# Patient Record
Sex: MALE | Race: WHITE | Employment: FULL TIME | URBAN - METROPOLITAN AREA
[De-identification: names, ages, dates, MRNs, and addresses within clinical notes are randomized per-mention and may not be internally consistent; named-entity substitution may affect disease eponyms.]

---

## 2022-06-16 ENCOUNTER — OFFICE VISIT (OUTPATIENT)
Dept: FAMILY MEDICINE CLINIC | Facility: CLINIC | Age: 62
End: 2022-06-16
Payer: COMMERCIAL

## 2022-06-16 VITALS
HEIGHT: 70 IN | BODY MASS INDEX: 31.78 KG/M2 | HEART RATE: 82 BPM | WEIGHT: 222 LBS | DIASTOLIC BLOOD PRESSURE: 80 MMHG | RESPIRATION RATE: 14 BRPM | SYSTOLIC BLOOD PRESSURE: 148 MMHG | TEMPERATURE: 99 F | OXYGEN SATURATION: 99 %

## 2022-06-16 DIAGNOSIS — J06.9 URI WITH COUGH AND CONGESTION: Primary | ICD-10-CM

## 2022-06-16 DIAGNOSIS — Z20.822 EXPOSURE TO COVID-19 VIRUS: ICD-10-CM

## 2022-06-16 DIAGNOSIS — R03.0 ELEVATED BLOOD PRESSURE READING: ICD-10-CM

## 2022-06-16 PROCEDURE — 3008F BODY MASS INDEX DOCD: CPT | Performed by: PHYSICIAN ASSISTANT

## 2022-06-16 PROCEDURE — 3077F SYST BP >= 140 MM HG: CPT | Performed by: PHYSICIAN ASSISTANT

## 2022-06-16 PROCEDURE — 3079F DIAST BP 80-89 MM HG: CPT | Performed by: PHYSICIAN ASSISTANT

## 2022-06-16 PROCEDURE — 99203 OFFICE O/P NEW LOW 30 MIN: CPT | Performed by: PHYSICIAN ASSISTANT

## 2022-06-17 LAB — SARS-COV-2 RNA RESP QL NAA+PROBE: DETECTED

## 2025-01-13 RX ORDER — CETIRIZINE HYDROCHLORIDE 10 MG/1
10 TABLET, CHEWABLE ORAL DAILY
COMMUNITY

## 2025-01-13 RX ORDER — ROSUVASTATIN CALCIUM 20 MG/1
20 TABLET, COATED ORAL NIGHTLY
Status: ON HOLD | COMMUNITY
End: 2025-01-21

## 2025-01-13 RX ORDER — ASPIRIN 81 MG/1
81 TABLET, CHEWABLE ORAL DAILY
COMMUNITY

## 2025-01-13 NOTE — PAT NURSING NOTE
PreOp Instructions     You are scheduled for: a Cardiac Procedure     Date of Procedure: 01/15/25     Diet Instructions: Do not eat or drink anything after midnight including gum, mints, candy, etc the night before your procedure.     Medications: Take Aspirin 81 mg x 4 tablets the day of your procedure. Medications you are allowed to take can be taken with a sip of water the morning of your procedure.     Medications to Stop: Do not take any herbal supplements and vitamins the morning of your procedure.     Skin Prep : Shower with antibacterial soap using a clean washcloth, prior to procedure. Once dried off, no lotions/powders/creams/ointments, etc., Do not shave the procedure area, this will be completed at the hospital during the preparation phase.     Arrival Time: The day prior to your procedure you will receive a phone call between 3:00 pm - 6:00 pm with your arrival time. If you haven't received a phone call, please check your voicemail messages., If you did not receive a voice mail and it is after 6:00 pm, please call the nursing supervisor at [phone].    Driving After Procedure: Sedation will be given so you WILL NOT be able to drive home. You will need a responsible adult  to drive you home. You can NOT take uber or taxi unless approved by your physician in advance.     Discharge Teaching: Your nurse will give you specific instructions before discharge, Most people can resume normal activities in 2-3 days, Any questions, please call the physician's office

## 2025-01-15 ENCOUNTER — ANESTHESIA EVENT (OUTPATIENT)
Dept: CARDIAC SURGERY | Facility: HOSPITAL | Age: 65
End: 2025-01-15
Payer: COMMERCIAL

## 2025-01-15 ENCOUNTER — HOSPITAL ENCOUNTER (INPATIENT)
Dept: INTERVENTIONAL RADIOLOGY/VASCULAR | Facility: HOSPITAL | Age: 65
LOS: 7 days | Discharge: HOME HEALTH | End: 2025-01-22
Attending: INTERNAL MEDICINE | Admitting: INTERNAL MEDICINE
Payer: COMMERCIAL

## 2025-01-15 ENCOUNTER — APPOINTMENT (OUTPATIENT)
Dept: GENERAL RADIOLOGY | Facility: HOSPITAL | Age: 65
End: 2025-01-15
Attending: PHYSICIAN ASSISTANT
Payer: COMMERCIAL

## 2025-01-15 DIAGNOSIS — J90 PLEURAL EFFUSION: Primary | ICD-10-CM

## 2025-01-15 DIAGNOSIS — R94.39 ABNORMAL STRESS TEST: ICD-10-CM

## 2025-01-15 DIAGNOSIS — G89.18 POST-OPERATIVE PAIN: ICD-10-CM

## 2025-01-15 LAB
ALBUMIN SERPL-MCNC: 4.4 G/DL (ref 3.2–4.8)
ALBUMIN/GLOB SERPL: 1.6 {RATIO} (ref 1–2)
ALP LIVER SERPL-CCNC: 84 U/L
ALT SERPL-CCNC: 35 U/L
ANION GAP SERPL CALC-SCNC: 8 MMOL/L (ref 0–18)
ANTIBODY SCREEN: NEGATIVE
APTT PPP: 29.8 SECONDS (ref 23–36)
AST SERPL-CCNC: 34 U/L (ref ?–34)
BILIRUB SERPL-MCNC: 1.3 MG/DL (ref 0.2–1.1)
BILIRUB UR QL STRIP.AUTO: NEGATIVE
BUN BLD-MCNC: 13 MG/DL (ref 9–23)
CALCIUM BLD-MCNC: 9.3 MG/DL (ref 8.7–10.6)
CHLORIDE SERPL-SCNC: 108 MMOL/L (ref 98–112)
CLARITY UR REFRACT.AUTO: CLEAR
CO2 SERPL-SCNC: 24 MMOL/L (ref 21–32)
CREAT BLD-MCNC: 0.95 MG/DL
EGFRCR SERPLBLD CKD-EPI 2021: 88 ML/MIN/1.73M2 (ref 60–?)
ERYTHROCYTE [DISTWIDTH] IN BLOOD BY AUTOMATED COUNT: 13.2 %
EST. AVERAGE GLUCOSE BLD GHB EST-MCNC: 120 MG/DL (ref 68–126)
GLOBULIN PLAS-MCNC: 2.8 G/DL (ref 2–3.5)
GLUCOSE BLD-MCNC: 130 MG/DL (ref 70–99)
GLUCOSE UR STRIP.AUTO-MCNC: NORMAL MG/DL
HBA1C MFR BLD: 5.8 % (ref ?–5.7)
HCT VFR BLD AUTO: 42.8 %
HGB BLD-MCNC: 15.2 G/DL
INR BLD: 1.05 (ref 0.8–1.2)
KETONES UR STRIP.AUTO-MCNC: NEGATIVE MG/DL
LEUKOCYTE ESTERASE UR QL STRIP.AUTO: NEGATIVE
MCH RBC QN AUTO: 31.8 PG (ref 26–34)
MCHC RBC AUTO-ENTMCNC: 35.5 G/DL (ref 31–37)
MCV RBC AUTO: 89.5 FL
NITRITE UR QL STRIP.AUTO: NEGATIVE
OSMOLALITY SERPL CALC.SUM OF ELEC: 292 MOSM/KG (ref 275–295)
PH UR STRIP.AUTO: 6 [PH] (ref 5–8)
PLATELET # BLD AUTO: 184 10(3)UL (ref 150–450)
POTASSIUM SERPL-SCNC: 3.9 MMOL/L (ref 3.5–5.1)
PROT SERPL-MCNC: 7.2 G/DL (ref 5.7–8.2)
PROT UR STRIP.AUTO-MCNC: NEGATIVE MG/DL
PROTHROMBIN TIME: 13.9 SECONDS (ref 11.6–14.8)
RBC # BLD AUTO: 4.78 X10(6)UL
RBC UR QL AUTO: NEGATIVE
RH BLOOD TYPE: NEGATIVE
RH BLOOD TYPE: NEGATIVE
SODIUM SERPL-SCNC: 140 MMOL/L (ref 136–145)
SP GR UR STRIP.AUTO: >1.03 (ref 1–1.03)
UROBILINOGEN UR STRIP.AUTO-MCNC: NORMAL MG/DL
WBC # BLD AUTO: 8.3 X10(3) UL (ref 4–11)

## 2025-01-15 PROCEDURE — 71045 X-RAY EXAM CHEST 1 VIEW: CPT | Performed by: PHYSICIAN ASSISTANT

## 2025-01-15 PROCEDURE — B2111ZZ FLUOROSCOPY OF MULTIPLE CORONARY ARTERIES USING LOW OSMOLAR CONTRAST: ICD-10-PCS | Performed by: INTERNAL MEDICINE

## 2025-01-15 PROCEDURE — B2151ZZ FLUOROSCOPY OF LEFT HEART USING LOW OSMOLAR CONTRAST: ICD-10-PCS | Performed by: INTERNAL MEDICINE

## 2025-01-15 PROCEDURE — 4A023N7 MEASUREMENT OF CARDIAC SAMPLING AND PRESSURE, LEFT HEART, PERCUTANEOUS APPROACH: ICD-10-PCS | Performed by: INTERNAL MEDICINE

## 2025-01-15 RX ORDER — ROSUVASTATIN CALCIUM 20 MG/1
20 TABLET, COATED ORAL NIGHTLY
Status: DISCONTINUED | OUTPATIENT
Start: 2025-01-15 | End: 2025-01-17

## 2025-01-15 RX ORDER — LIDOCAINE HYDROCHLORIDE 10 MG/ML
INJECTION, SOLUTION EPIDURAL; INFILTRATION; INTRACAUDAL; PERINEURAL
Status: COMPLETED
Start: 2025-01-15 | End: 2025-01-15

## 2025-01-15 RX ORDER — VERAPAMIL HYDROCHLORIDE 2.5 MG/ML
INJECTION, SOLUTION INTRAVENOUS
Status: COMPLETED
Start: 2025-01-15 | End: 2025-01-15

## 2025-01-15 RX ORDER — NITROGLYCERIN 20 MG/100ML
INJECTION INTRAVENOUS
Status: COMPLETED
Start: 2025-01-15 | End: 2025-01-15

## 2025-01-15 RX ORDER — CETIRIZINE HYDROCHLORIDE 10 MG/1
10 TABLET ORAL DAILY
Status: DISCONTINUED | OUTPATIENT
Start: 2025-01-16 | End: 2025-01-22

## 2025-01-15 RX ORDER — HEPARIN SODIUM 5000 [USP'U]/ML
INJECTION, SOLUTION INTRAVENOUS; SUBCUTANEOUS
Status: COMPLETED
Start: 2025-01-15 | End: 2025-01-15

## 2025-01-15 RX ORDER — MIDAZOLAM HYDROCHLORIDE 1 MG/ML
INJECTION INTRAMUSCULAR; INTRAVENOUS
Status: COMPLETED
Start: 2025-01-15 | End: 2025-01-15

## 2025-01-15 RX ORDER — SODIUM CHLORIDE 9 MG/ML
INJECTION, SOLUTION INTRAVENOUS
Status: DISCONTINUED | OUTPATIENT
Start: 2025-01-16 | End: 2025-01-15 | Stop reason: HOSPADM

## 2025-01-15 RX ORDER — ASPIRIN 81 MG/1
81 TABLET, CHEWABLE ORAL DAILY
Status: DISCONTINUED | OUTPATIENT
Start: 2025-01-16 | End: 2025-01-16

## 2025-01-15 RX ADMIN — ROSUVASTATIN CALCIUM 20 MG: 20 TABLET, COATED ORAL at 23:25:00

## 2025-01-15 NOTE — PROCEDURES
OPERATIVE REPORT - CARDIAC CATHETERIZATION    Date of Procedure: 1/15/2025     Performing Physician: Mathew Grewal MD    Pre-Procedure Diagnosis:   CAD by CAC score > 1000  Abnormal stress test  Chest pain with high level exertion    Post-Procedure Diagnosis:  Critical ostial left main stenosis    Findings:  Left main: Ostial >90% stenosis, likely heavily calcified nodular plaque  LAD: Transapical vessel, mild plaquing mid < 50%, luminal irregularities several smaller diagonals  Left circumflex: Supplies a large principal OM branch, luminal irregularities   RCA: Dominant to PDA. Large PDA/RPL system with several branches, mild plaquing only.   LV: EF 60%, no WMA, no sig MR, LVEDP 13 mmHg. No LV-Ao gradient on pullback.     Conclusions / Recommendations:  Critical ostial left main stenosis with good distal targets for bypass. Discussed with Dr. Coffman who will evaluate for CABG.   Check echo  Remove the TR band per protocol  Post cath IVF  Continue medical therapy    -----------------------    Procedures performed:   1. Left heart catheterization  2. Selective bilateral coronary angiography  3. Moderate sedation  4. Access of right radial artery    Indications: This is a 64 year old male presenting for left heart catheterization with coronary angiography to evaluate for obstructive CAD.     Description of Procedure: Informed consent was obtained from the patient in writing. The risks and benefits of the procedure were reviewed in detail with the patient, including but not limited to the risk of myocardial infarction, stroke, renal failure, bleeding, and death. After a thorough discussion of these risks and benefits, the patient agreed to proceed and signed an informed consent document. The patient was brought to the cardiac catheterization laboratory in the fasting state. Moderate sedation was employed using a total of IV Versed 2 mg and IV fentanyl 50 mcg in divided doses.  I directly observed the patient from 3593  to 1425, and an independent trained observer was present and assisted in the monitoring of the patient's level of consciousness and physiological status, heart rate, blood pressure, oximetry, and rhythm. All operators present for the case performed standard pre-procedural prep including hand washing, sterile gloves, gown, mask, and cap. All aspects of the maximum sterile barrier technique were followed. A preprocedural time out was performed with all physicians, technologists, and nurses involved with the procedure. 1% lidocaine was infiltrated subcutaneously in the right wrist for local anesthesia. Ultrasound guided access was obtained in the right radial artery with a 5/6F Slender Glidesheath using the Seldinger technique and a micropuncture needle with a single anterior wall puncture. Standard vasodilator cocktail was given with heparin 5000 units, nitroglycerin 200 mcg and verapamil 2.5 mg through the arterial sheath. A 6F TIG catheter was advanced over a J tipped wire through the arterial sheath and placed in the aortic root, then used to perform nonselective angiography of the left main coronary artery. Due to severe ostial disease we tried not to manipulate the vessel itself. Standard angiographic views were taken in orthogonal projections. The RCA was then engaged and standard angiographic views were performed. The catheter was then exchanged for a pigtail and prolapsed into the LV over a wire and placed at the base of the LV. LV systolic and end diastolic pressure was then recorded. LV gram performed. The catheter was pulled back and pullback measurements of LV and aortic pressure and recorded. The catheter was then removed over a wire. At the conclusion of the procedure, all catheters and wires were removed over a wire. The arterial sheath was removed and hemostasis achieved with standard TR band using patent hemostasis technique. There was no bleeding or hematoma. The patient tolerated the procedure well  without complication. EBL <10 mL. Total contrast ~ 100 mL. See procedure log for fluoro time and radiation dose.      Mathew Grewal MD  Interventional Cardiology  [facility]

## 2025-01-15 NOTE — PROGRESS NOTES
Pt post LHC. Pt awake. Vss. Right radial artery access site is CDI with TR band in place.     Report given to Jazmín loomis, pt will be transferred to 2616.    TR band removed per protocol. Right radial site remains soft with no signs of bleeding or hematoma. Pressure drsg applied.

## 2025-01-15 NOTE — PLAN OF CARE
Plan for CABG tomorrow. Pre-ops in.    Geeta Celeste PA-C   Cardiothoracic Surgery   1/15/2025  4:22 PM

## 2025-01-16 ENCOUNTER — APPOINTMENT (OUTPATIENT)
Dept: CV DIAGNOSTICS | Facility: HOSPITAL | Age: 65
End: 2025-01-16
Attending: INTERNAL MEDICINE
Payer: COMMERCIAL

## 2025-01-16 ENCOUNTER — ANESTHESIA (OUTPATIENT)
Dept: CARDIAC SURGERY | Facility: HOSPITAL | Age: 65
End: 2025-01-16
Payer: COMMERCIAL

## 2025-01-16 ENCOUNTER — APPOINTMENT (OUTPATIENT)
Dept: GENERAL RADIOLOGY | Facility: HOSPITAL | Age: 65
End: 2025-01-16
Attending: PHYSICIAN ASSISTANT
Payer: COMMERCIAL

## 2025-01-16 PROBLEM — R94.39 ABNORMAL STRESS TEST: Status: ACTIVE | Noted: 2025-01-16

## 2025-01-16 LAB
ALBUMIN SERPL-MCNC: 4.6 G/DL (ref 3.2–4.8)
ANION GAP SERPL CALC-SCNC: 6 MMOL/L (ref 0–18)
APTT PPP: 26.8 SECONDS (ref 23–36)
APTT PPP: 27.6 SECONDS (ref 23–36)
ATRIAL RATE: 81 BPM
BASE EXCESS BLD CALC-SCNC: -1 MMOL/L
BASE EXCESS BLD CALC-SCNC: -5 MMOL/L
BASE EXCESS BLDA CALC-SCNC: -0.2 MMOL/L (ref ?–2)
BASE EXCESS BLDA CALC-SCNC: -1 MMOL/L (ref ?–30)
BASE EXCESS BLDA CALC-SCNC: -3 MMOL/L (ref ?–30)
BASE EXCESS BLDA CALC-SCNC: 0.1 MMOL/L (ref ?–2)
BASE EXCESS BLDMV CALC-SCNC: -2 MMOL/L (ref ?–30)
BASE EXCESS BLDMV CALC-SCNC: 1 MMOL/L (ref ?–30)
BODY TEMPERATURE: 98.6 F
BODY TEMPERATURE: 98.6 F
BUN BLD-MCNC: 12 MG/DL (ref 9–23)
BUN BLD-MCNC: 13 MG/DL (ref 9–23)
CA-I BLD-SCNC: 1.1 MMOL/L (ref 1.12–1.32)
CA-I BLD-SCNC: 1.12 MMOL/L (ref 1.12–1.32)
CA-I BLD-SCNC: 1.14 MMOL/L (ref 0.95–1.32)
CA-I BLD-SCNC: 1.16 MMOL/L (ref 0.95–1.32)
CA-I BLDA-SCNC: 1.29 MMOL/L (ref 1.12–1.32)
CA-I BLDA-SCNC: 1.35 MMOL/L (ref 1.12–1.32)
CA-I BLDMV-SCNC: 1.1 MMOL/L (ref 1.12–1.32)
CA-I BLDMV-SCNC: 1.11 MMOL/L (ref 1.12–1.32)
CALCIUM BLD-MCNC: 8.3 MG/DL (ref 8.7–10.6)
CALCIUM BLD-MCNC: 9.6 MG/DL (ref 8.7–10.6)
CHLORIDE SERPL-SCNC: 107 MMOL/L (ref 98–112)
CHLORIDE SERPL-SCNC: 108 MMOL/L (ref 98–112)
CO2 BLD-SCNC: 22 MMOL/L (ref 22–32)
CO2 BLD-SCNC: 26 MMOL/L (ref 22–32)
CO2 BLDA-SCNC: 24 MMOL/L (ref 22–32)
CO2 BLDA-SCNC: 24 MMOL/L (ref 22–32)
CO2 BLDMV-SCNC: 25 MMOL/L (ref 22–32)
CO2 BLDMV-SCNC: 27 MMOL/L (ref 22–32)
CO2 SERPL-SCNC: 25 MMOL/L (ref 21–32)
CO2 SERPL-SCNC: 26 MMOL/L (ref 21–32)
COHGB MFR BLD: 1.1 % SAT (ref 0–3)
COHGB MFR BLD: 1.2 % SAT (ref 0–3)
CREAT BLD-MCNC: 0.85 MG/DL
CREAT BLD-MCNC: 0.91 MG/DL
EGFRCR SERPLBLD CKD-EPI 2021: 93 ML/MIN/1.73M2 (ref 60–?)
EGFRCR SERPLBLD CKD-EPI 2021: 96 ML/MIN/1.73M2 (ref 60–?)
ERYTHROCYTE [DISTWIDTH] IN BLOOD BY AUTOMATED COUNT: 13.1 %
ERYTHROCYTE [DISTWIDTH] IN BLOOD BY AUTOMATED COUNT: 13.1 %
FIBRINOGEN PPP-MCNC: 266 MG/DL (ref 200–480)
FIBRINOGEN PPP-MCNC: 298 MG/DL (ref 200–480)
FIO2: 40 %
FIO2: 50 %
GLUCOSE BLD-MCNC: 103 MG/DL (ref 70–99)
GLUCOSE BLD-MCNC: 113 MG/DL (ref 70–99)
GLUCOSE BLD-MCNC: 141 MG/DL (ref 70–99)
GLUCOSE BLD-MCNC: 143 MG/DL (ref 70–99)
GLUCOSE BLD-MCNC: 144 MG/DL (ref 70–99)
GLUCOSE BLD-MCNC: 152 MG/DL (ref 70–99)
GLUCOSE BLD-MCNC: 159 MG/DL (ref 70–99)
GLUCOSE BLD-MCNC: 173 MG/DL (ref 70–99)
GLUCOSE BLD-MCNC: 178 MG/DL (ref 70–99)
GLUCOSE BLD-MCNC: 83 MG/DL (ref 70–99)
GLUCOSE BLD-MCNC: 98 MG/DL (ref 70–99)
GLUCOSE BLDA-MCNC: 117 MG/DL (ref 70–99)
GLUCOSE BLDA-MCNC: 117 MG/DL (ref 70–99)
HCO3 BLD-SCNC: 20.6 MEQ/L
HCO3 BLD-SCNC: 24.9 MEQ/L
HCO3 BLDA-SCNC: 22.5 MEQ/L (ref 22–26)
HCO3 BLDA-SCNC: 23.3 MEQ/L (ref 22–26)
HCO3 BLDA-SCNC: 24.8 MEQ/L (ref 21–27)
HCO3 BLDA-SCNC: 25 MEQ/L (ref 21–27)
HCO3 BLDMV-SCNC: 23.9 MEQ/L (ref 22–26)
HCO3 BLDMV-SCNC: 26.1 MEQ/L (ref 22–26)
HCT VFR BLD AUTO: 28.8 %
HCT VFR BLD AUTO: 42.3 %
HCT VFR BLD CALC: 27 %
HCT VFR BLD CALC: 37 %
HCT VFR BLDA CALC: 33 %
HCT VFR BLDA CALC: 34 %
HCT VFR BLDMV CALC: 30 %
HCT VFR BLDMV CALC: 32 %
HGB BLD-MCNC: 10.3 G/DL
HGB BLD-MCNC: 10.8 G/DL
HGB BLD-MCNC: 10.9 G/DL
HGB BLD-MCNC: 15.1 G/DL
INR BLD: 0.74 (ref 0.8–1.2)
INR BLD: 1.49 (ref 0.8–1.2)
ISTAT ACTIVATED CLOTTING TIME: 118 SECONDS (ref 74–137)
ISTAT ACTIVATED CLOTTING TIME: 147 SECONDS (ref 74–137)
ISTAT ACTIVATED CLOTTING TIME: 538 SECONDS (ref 74–137)
ISTAT ACTIVATED CLOTTING TIME: 769 SECONDS (ref 74–137)
ISTAT ACTIVATED CLOTTING TIME: 821 SECONDS (ref 74–137)
ISTAT PATIENT TEMPERATURE: 32 DEGREE
ISTAT PATIENT TEMPERATURE: 35 DEGREE
LACTATE BLD-SCNC: 1.8 MMOL/L (ref 0.5–2)
LACTATE BLD-SCNC: 2.8 MMOL/L (ref 0.5–2)
MAGNESIUM SERPL-MCNC: 2.1 MG/DL (ref 1.6–2.6)
MAGNESIUM SERPL-MCNC: 2.3 MG/DL (ref 1.6–2.6)
MCH RBC QN AUTO: 31.7 PG (ref 26–34)
MCH RBC QN AUTO: 31.7 PG (ref 26–34)
MCHC RBC AUTO-ENTMCNC: 35.7 G/DL (ref 31–37)
MCHC RBC AUTO-ENTMCNC: 35.8 G/DL (ref 31–37)
MCV RBC AUTO: 88.6 FL
MCV RBC AUTO: 88.7 FL
METHGB MFR BLD: 0 % SAT (ref 0.4–1.5)
METHGB MFR BLD: 0.3 % SAT (ref 0.4–1.5)
MRSA DNA SPEC QL NAA+PROBE: NEGATIVE
OSMOLALITY SERPL CALC.SUM OF ELEC: 286 MOSM/KG (ref 275–295)
OXYHGB MFR BLDA: 96 % (ref 92–100)
OXYHGB MFR BLDA: 97.4 % (ref 92–100)
P AXIS: 53 DEGREES
P-R INTERVAL: 152 MS
PCO2 BLD: 37.8 MMHG
PCO2 BLD: 44.4 MMHG
PCO2 BLDA: 37.6 MMHG (ref 35–45)
PCO2 BLDA: 40.4 MMHG (ref 35–45)
PCO2 BLDA: 41 MM HG (ref 35–45)
PCO2 BLDA: 43 MM HG (ref 35–45)
PCO2 BLDMV: 37.6 MMHG (ref 38–50)
PCO2 BLDMV: 38.1 MMHG (ref 38–50)
PEEP: 5 CM H2O
PEEP: 5 CM H2O
PH BLD: 7.34 [PH]
PH BLD: 7.36 [PH]
PH BLDA: 7.36 [PH] (ref 7.35–7.45)
PH BLDA: 7.38 [PH] (ref 7.35–7.45)
PH BLDA: 7.39 [PH] (ref 7.35–7.45)
PH BLDA: 7.4 [PH] (ref 7.35–7.45)
PH BLDMV: 7.39 [PH] (ref 7.32–7.43)
PH BLDMV: 7.43 [PH] (ref 7.32–7.43)
PHOSPHATE SERPL-MCNC: 2.9 MG/DL (ref 2.4–5.1)
PLATELET # BLD AUTO: 137 10(3)UL (ref 150–450)
PLATELET # BLD AUTO: 164 10(3)UL (ref 150–450)
PLATELET # BLD AUTO: 187 10(3)UL (ref 150–450)
PLATELETS.RETICULATED NFR BLD AUTO: 2.3 % (ref 0–7)
PO2 BLD: 116 MMHG
PO2 BLD: 414 MMHG
PO2 BLDA: 252 MMHG (ref 80–105)
PO2 BLDA: 277 MMHG (ref 80–105)
PO2 BLDA: 83 MM HG (ref 80–100)
PO2 BLDA: 96 MM HG (ref 80–100)
PO2 BLDMV: <70 MMHG (ref 35–40)
PO2 BLDMV: <70 MMHG (ref 35–40)
POTASSIUM BLD-SCNC: 3.5 MMOL/L (ref 3.6–5.1)
POTASSIUM BLD-SCNC: 3.6 MMOL/L (ref 3.6–5.1)
POTASSIUM BLD-SCNC: 4.1 MMOL/L (ref 3.6–5.1)
POTASSIUM BLD-SCNC: 4.2 MMOL/L (ref 3.6–5.1)
POTASSIUM SERPL-SCNC: 3.7 MMOL/L (ref 3.5–5.1)
POTASSIUM SERPL-SCNC: 4 MMOL/L (ref 3.5–5.1)
PROTHROMBIN TIME: 10.5 SECONDS (ref 11.6–14.8)
PROTHROMBIN TIME: 18.2 SECONDS (ref 11.6–14.8)
Q-T INTERVAL: 362 MS
QRS DURATION: 86 MS
QTC CALCULATION (BEZET): 420 MS
R AXIS: -11 DEGREES
RBC # BLD AUTO: 3.25 X10(6)UL
RBC # BLD AUTO: 4.77 X10(6)UL
SAO2 % BLD: 100 %
SAO2 % BLD: 98 %
SAO2 % BLDA: 100 % (ref 92–100)
SAO2 % BLDA: 100 % (ref 92–100)
SAO2 % BLDMV: 79 % (ref 60–85)
SAO2 % BLDMV: 82 % (ref 60–85)
SODIUM BLD-SCNC: 140 MMOL/L (ref 135–145)
SODIUM BLD-SCNC: 140 MMOL/L (ref 135–145)
SODIUM BLD-SCNC: 143 MMOL/L (ref 136–145)
SODIUM BLD-SCNC: 144 MMOL/L (ref 136–145)
SODIUM BLDA-SCNC: 135 MMOL/L (ref 136–145)
SODIUM BLDA-SCNC: 138 MMOL/L (ref 136–145)
SODIUM BLDA-SCNC: 4.3 MMOL/L (ref 3.6–5.1)
SODIUM BLDA-SCNC: 5.7 MMOL/L (ref 3.6–5.1)
SODIUM BLDMV-SCNC: 135 MMOL/L (ref 136–145)
SODIUM BLDMV-SCNC: 137 MMOL/L (ref 136–145)
SODIUM BLDMV-SCNC: 5 MMOL/L (ref 3.6–5.1)
SODIUM BLDMV-SCNC: 6.3 MMOL/L (ref 3.6–5.1)
SODIUM SERPL-SCNC: 138 MMOL/L (ref 136–145)
SODIUM SERPL-SCNC: 146 MMOL/L (ref 136–145)
T AXIS: 108 DEGREES
TIDAL VOLUME: 550 ML
TIDAL VOLUME: 550 ML
VENT RATE: 16 /MIN
VENT RATE: 16 /MIN
VENTRICULAR RATE: 81 BPM
WBC # BLD AUTO: 12.9 X10(3) UL (ref 4–11)
WBC # BLD AUTO: 6.8 X10(3) UL (ref 4–11)

## 2025-01-16 PROCEDURE — 36430 TRANSFUSION BLD/BLD COMPNT: CPT | Performed by: ANESTHESIOLOGY

## 2025-01-16 PROCEDURE — 021009W BYPASS CORONARY ARTERY, ONE ARTERY FROM AORTA WITH AUTOLOGOUS VENOUS TISSUE, OPEN APPROACH: ICD-10-PCS | Performed by: THORACIC SURGERY (CARDIOTHORACIC VASCULAR SURGERY)

## 2025-01-16 PROCEDURE — S0028 INJECTION, FAMOTIDINE, 20 MG: HCPCS | Performed by: ANESTHESIOLOGY

## 2025-01-16 PROCEDURE — 06BQ4ZZ EXCISION OF LEFT SAPHENOUS VEIN, PERCUTANEOUS ENDOSCOPIC APPROACH: ICD-10-PCS | Performed by: THORACIC SURGERY (CARDIOTHORACIC VASCULAR SURGERY)

## 2025-01-16 PROCEDURE — 30233K1 TRANSFUSION OF NONAUTOLOGOUS FROZEN PLASMA INTO PERIPHERAL VEIN, PERCUTANEOUS APPROACH: ICD-10-PCS | Performed by: INTERNAL MEDICINE

## 2025-01-16 PROCEDURE — 30233R1 TRANSFUSION OF NONAUTOLOGOUS PLATELETS INTO PERIPHERAL VEIN, PERCUTANEOUS APPROACH: ICD-10-PCS | Performed by: INTERNAL MEDICINE

## 2025-01-16 PROCEDURE — 71045 X-RAY EXAM CHEST 1 VIEW: CPT | Performed by: PHYSICIAN ASSISTANT

## 2025-01-16 PROCEDURE — 5A1221Z PERFORMANCE OF CARDIAC OUTPUT, CONTINUOUS: ICD-10-PCS | Performed by: THORACIC SURGERY (CARDIOTHORACIC VASCULAR SURGERY)

## 2025-01-16 PROCEDURE — 93306 TTE W/DOPPLER COMPLETE: CPT | Performed by: INTERNAL MEDICINE

## 2025-01-16 PROCEDURE — 02100Z9 BYPASS CORONARY ARTERY, ONE ARTERY FROM LEFT INTERNAL MAMMARY, OPEN APPROACH: ICD-10-PCS | Performed by: THORACIC SURGERY (CARDIOTHORACIC VASCULAR SURGERY)

## 2025-01-16 PROCEDURE — 93312 ECHO TRANSESOPHAGEAL: CPT | Performed by: ANESTHESIOLOGY

## 2025-01-16 RX ORDER — NICOTINE POLACRILEX 4 MG
15 LOZENGE BUCCAL
Status: DISCONTINUED | OUTPATIENT
Start: 2025-01-16 | End: 2025-01-22

## 2025-01-16 RX ORDER — NICOTINE POLACRILEX 4 MG
30 LOZENGE BUCCAL
Status: DISCONTINUED | OUTPATIENT
Start: 2025-01-16 | End: 2025-01-22

## 2025-01-16 RX ORDER — ROCURONIUM BROMIDE 10 MG/ML
INJECTION, SOLUTION INTRAVENOUS AS NEEDED
Status: DISCONTINUED | OUTPATIENT
Start: 2025-01-16 | End: 2025-01-16 | Stop reason: SURG

## 2025-01-16 RX ORDER — ONDANSETRON 2 MG/ML
4 INJECTION INTRAMUSCULAR; INTRAVENOUS EVERY 6 HOURS PRN
Status: DISCONTINUED | OUTPATIENT
Start: 2025-01-16 | End: 2025-01-22

## 2025-01-16 RX ORDER — POTASSIUM CHLORIDE 29.8 MG/ML
40 INJECTION INTRAVENOUS AS NEEDED
Status: DISCONTINUED | OUTPATIENT
Start: 2025-01-16 | End: 2025-01-17

## 2025-01-16 RX ORDER — ASPIRIN 81 MG/1
81 TABLET, CHEWABLE ORAL DAILY
Status: DISCONTINUED | OUTPATIENT
Start: 2025-01-17 | End: 2025-01-22

## 2025-01-16 RX ORDER — HEPARIN SODIUM 1000 [USP'U]/ML
INJECTION, SOLUTION INTRAVENOUS; SUBCUTANEOUS AS NEEDED
Status: DISCONTINUED | OUTPATIENT
Start: 2025-01-16 | End: 2025-01-16 | Stop reason: SURG

## 2025-01-16 RX ORDER — PROTAMINE SULFATE 10 MG/ML
INJECTION, SOLUTION INTRAVENOUS AS NEEDED
Status: DISCONTINUED | OUTPATIENT
Start: 2025-01-16 | End: 2025-01-16 | Stop reason: SURG

## 2025-01-16 RX ORDER — SODIUM CHLORIDE 9 MG/ML
INJECTION, SOLUTION INTRAVENOUS CONTINUOUS
Status: DISCONTINUED | OUTPATIENT
Start: 2025-01-16 | End: 2025-01-22

## 2025-01-16 RX ORDER — MIDAZOLAM HYDROCHLORIDE 1 MG/ML
1 INJECTION INTRAMUSCULAR; INTRAVENOUS EVERY 30 MIN PRN
Status: DISCONTINUED | OUTPATIENT
Start: 2025-01-16 | End: 2025-01-17

## 2025-01-16 RX ORDER — MORPHINE SULFATE 4 MG/ML
4 INJECTION, SOLUTION INTRAMUSCULAR; INTRAVENOUS EVERY 2 HOUR PRN
Status: DISCONTINUED | OUTPATIENT
Start: 2025-01-16 | End: 2025-01-22

## 2025-01-16 RX ORDER — MAGNESIUM SULFATE HEPTAHYDRATE 40 MG/ML
2 INJECTION, SOLUTION INTRAVENOUS AS NEEDED
Status: DISCONTINUED | OUTPATIENT
Start: 2025-01-16 | End: 2025-01-17

## 2025-01-16 RX ORDER — POLYETHYLENE GLYCOL 3350 17 G/17G
17 POWDER, FOR SOLUTION ORAL DAILY PRN
Status: DISCONTINUED | OUTPATIENT
Start: 2025-01-16 | End: 2025-01-22

## 2025-01-16 RX ORDER — ACETAMINOPHEN 10 MG/ML
1000 INJECTION, SOLUTION INTRAVENOUS EVERY 6 HOURS PRN
Status: ACTIVE | OUTPATIENT
Start: 2025-01-16 | End: 2025-01-17

## 2025-01-16 RX ORDER — DEXMEDETOMIDINE HYDROCHLORIDE 4 UG/ML
INJECTION, SOLUTION INTRAVENOUS CONTINUOUS PRN
Status: DISCONTINUED | OUTPATIENT
Start: 2025-01-16 | End: 2025-01-16 | Stop reason: SURG

## 2025-01-16 RX ORDER — DIPHENHYDRAMINE HYDROCHLORIDE 50 MG/ML
INJECTION INTRAMUSCULAR; INTRAVENOUS AS NEEDED
Status: DISCONTINUED | OUTPATIENT
Start: 2025-01-16 | End: 2025-01-16 | Stop reason: SURG

## 2025-01-16 RX ORDER — DEXTROSE MONOHYDRATE AND SODIUM CHLORIDE 5; .45 G/100ML; G/100ML
INJECTION, SOLUTION INTRAVENOUS CONTINUOUS
Status: DISCONTINUED | OUTPATIENT
Start: 2025-01-16 | End: 2025-01-17

## 2025-01-16 RX ORDER — MAGNESIUM SULFATE 1 G/100ML
1 INJECTION INTRAVENOUS AS NEEDED
Status: DISCONTINUED | OUTPATIENT
Start: 2025-01-16 | End: 2025-01-17

## 2025-01-16 RX ORDER — DIPHENHYDRAMINE HYDROCHLORIDE 50 MG/ML
12.5 INJECTION INTRAMUSCULAR; INTRAVENOUS EVERY 4 HOURS PRN
Status: DISCONTINUED | OUTPATIENT
Start: 2025-01-16 | End: 2025-01-18

## 2025-01-16 RX ORDER — FAMOTIDINE 10 MG/ML
INJECTION, SOLUTION INTRAVENOUS AS NEEDED
Status: DISCONTINUED | OUTPATIENT
Start: 2025-01-16 | End: 2025-01-16 | Stop reason: SURG

## 2025-01-16 RX ORDER — PHENYLEPHRINE HCL 10 MG/ML
VIAL (ML) INJECTION AS NEEDED
Status: DISCONTINUED | OUTPATIENT
Start: 2025-01-16 | End: 2025-01-16 | Stop reason: SURG

## 2025-01-16 RX ORDER — NITROGLYCERIN 20 MG/100ML
INJECTION INTRAVENOUS CONTINUOUS PRN
Status: DISCONTINUED | OUTPATIENT
Start: 2025-01-16 | End: 2025-01-17

## 2025-01-16 RX ORDER — ALBUMIN HUMAN 50 G/1000ML
12.5 SOLUTION INTRAVENOUS ONCE AS NEEDED
Status: DISPENSED | OUTPATIENT
Start: 2025-01-16 | End: 2025-01-17

## 2025-01-16 RX ORDER — DOCUSATE SODIUM 100 MG/1
100 CAPSULE, LIQUID FILLED ORAL 2 TIMES DAILY
Status: DISCONTINUED | OUTPATIENT
Start: 2025-01-17 | End: 2025-01-22

## 2025-01-16 RX ORDER — POTASSIUM CHLORIDE 14.9 MG/ML
20 INJECTION INTRAVENOUS AS NEEDED
Status: DISCONTINUED | OUTPATIENT
Start: 2025-01-16 | End: 2025-01-17

## 2025-01-16 RX ORDER — BISACODYL 10 MG
10 SUPPOSITORY, RECTAL RECTAL
Status: DISCONTINUED | OUTPATIENT
Start: 2025-01-16 | End: 2025-01-22

## 2025-01-16 RX ORDER — NITROGLYCERIN 20 MG/100ML
INJECTION INTRAVENOUS CONTINUOUS PRN
Status: DISCONTINUED | OUTPATIENT
Start: 2025-01-16 | End: 2025-01-16 | Stop reason: SURG

## 2025-01-16 RX ORDER — PANTOPRAZOLE SODIUM 40 MG/1
40 TABLET, DELAYED RELEASE ORAL
Status: DISCONTINUED | OUTPATIENT
Start: 2025-01-17 | End: 2025-01-22

## 2025-01-16 RX ORDER — IPRATROPIUM BROMIDE AND ALBUTEROL SULFATE 2.5; .5 MG/3ML; MG/3ML
3 SOLUTION RESPIRATORY (INHALATION) EVERY 4 HOURS PRN
Status: DISCONTINUED | OUTPATIENT
Start: 2025-01-16 | End: 2025-01-22

## 2025-01-16 RX ORDER — DOBUTAMINE HYDROCHLORIDE 200 MG/100ML
INJECTION INTRAVENOUS CONTINUOUS PRN
Status: DISCONTINUED | OUTPATIENT
Start: 2025-01-16 | End: 2025-01-16 | Stop reason: SURG

## 2025-01-16 RX ORDER — SODIUM CHLORIDE 9 MG/ML
INJECTION, SOLUTION INTRAVENOUS CONTINUOUS PRN
Status: DISCONTINUED | OUTPATIENT
Start: 2025-01-16 | End: 2025-01-16 | Stop reason: SURG

## 2025-01-16 RX ORDER — METOCLOPRAMIDE HYDROCHLORIDE 5 MG/ML
INJECTION INTRAMUSCULAR; INTRAVENOUS AS NEEDED
Status: DISCONTINUED | OUTPATIENT
Start: 2025-01-16 | End: 2025-01-16 | Stop reason: SURG

## 2025-01-16 RX ORDER — VANCOMYCIN HYDROCHLORIDE
15 EVERY 12 HOURS
Status: COMPLETED | OUTPATIENT
Start: 2025-01-17 | End: 2025-01-17

## 2025-01-16 RX ORDER — MORPHINE SULFATE 2 MG/ML
2 INJECTION, SOLUTION INTRAMUSCULAR; INTRAVENOUS EVERY 2 HOUR PRN
Status: DISCONTINUED | OUTPATIENT
Start: 2025-01-16 | End: 2025-01-22

## 2025-01-16 RX ORDER — METHYLPREDNISOLONE SODIUM SUCCINATE 500 MG/8ML
INJECTION INTRAMUSCULAR; INTRAVENOUS AS NEEDED
Status: DISCONTINUED | OUTPATIENT
Start: 2025-01-16 | End: 2025-01-16 | Stop reason: SURG

## 2025-01-16 RX ORDER — DOBUTAMINE HYDROCHLORIDE 200 MG/100ML
INJECTION INTRAVENOUS CONTINUOUS PRN
Status: DISCONTINUED | OUTPATIENT
Start: 2025-01-16 | End: 2025-01-17

## 2025-01-16 RX ORDER — MIDAZOLAM HYDROCHLORIDE 1 MG/ML
INJECTION INTRAMUSCULAR; INTRAVENOUS AS NEEDED
Status: DISCONTINUED | OUTPATIENT
Start: 2025-01-16 | End: 2025-01-16 | Stop reason: SURG

## 2025-01-16 RX ORDER — SENNOSIDES 8.6 MG
8.6 TABLET ORAL 2 TIMES DAILY
Status: DISCONTINUED | OUTPATIENT
Start: 2025-01-17 | End: 2025-01-22

## 2025-01-16 RX ORDER — SODIUM CHLORIDE 9 MG/ML
INJECTION, SOLUTION INTRAVENOUS CONTINUOUS
Status: DISCONTINUED | OUTPATIENT
Start: 2025-01-16 | End: 2025-01-18

## 2025-01-16 RX ORDER — NALOXONE HYDROCHLORIDE 0.4 MG/ML
0.08 INJECTION, SOLUTION INTRAMUSCULAR; INTRAVENOUS; SUBCUTANEOUS
Status: DISCONTINUED | OUTPATIENT
Start: 2025-01-16 | End: 2025-01-18

## 2025-01-16 RX ORDER — DEXMEDETOMIDINE HYDROCHLORIDE 4 UG/ML
INJECTION, SOLUTION INTRAVENOUS CONTINUOUS
Status: DISCONTINUED | OUTPATIENT
Start: 2025-01-16 | End: 2025-01-17

## 2025-01-16 RX ORDER — CHLORHEXIDINE GLUCONATE ORAL RINSE 1.2 MG/ML
15 SOLUTION DENTAL
Status: DISCONTINUED | OUTPATIENT
Start: 2025-01-16 | End: 2025-01-17 | Stop reason: ALTCHOICE

## 2025-01-16 RX ORDER — DEXTROSE MONOHYDRATE 25 G/50ML
50 INJECTION, SOLUTION INTRAVENOUS
Status: DISCONTINUED | OUTPATIENT
Start: 2025-01-16 | End: 2025-01-22

## 2025-01-16 RX ADMIN — SODIUM CHLORIDE: 9 INJECTION, SOLUTION INTRAVENOUS at 17:31:00

## 2025-01-16 RX ADMIN — HEPARIN SODIUM 5000 UNITS: 1000 INJECTION, SOLUTION INTRAVENOUS; SUBCUTANEOUS at 13:29:00

## 2025-01-16 RX ADMIN — PROTAMINE SULFATE 50 MG: 10 INJECTION, SOLUTION INTRAVENOUS at 14:53:00

## 2025-01-16 RX ADMIN — PROTAMINE SULFATE 50 MG: 10 INJECTION, SOLUTION INTRAVENOUS at 14:54:00

## 2025-01-16 RX ADMIN — PHENYLEPHRINE HCL 200 MCG: 10 MG/ML VIAL (ML) INJECTION at 13:46:00

## 2025-01-16 RX ADMIN — MIDAZOLAM HYDROCHLORIDE 4 MG: 1 INJECTION INTRAMUSCULAR; INTRAVENOUS at 13:46:00

## 2025-01-16 RX ADMIN — SODIUM CHLORIDE: 9 INJECTION, SOLUTION INTRAVENOUS at 17:35:00

## 2025-01-16 RX ADMIN — NITROGLYCERIN 10 MCG/MIN: 20 INJECTION INTRAVENOUS at 15:10:00

## 2025-01-16 RX ADMIN — PROTAMINE SULFATE 50 MG: 10 INJECTION, SOLUTION INTRAVENOUS at 15:03:00

## 2025-01-16 RX ADMIN — MIDAZOLAM HYDROCHLORIDE 2 MG: 1 INJECTION INTRAMUSCULAR; INTRAVENOUS at 12:15:00

## 2025-01-16 RX ADMIN — DOBUTAMINE HYDROCHLORIDE 1 MCG/KG/MIN: 200 INJECTION INTRAVENOUS at 15:11:00

## 2025-01-16 RX ADMIN — DOBUTAMINE HYDROCHLORIDE 1 MCG/KG/MIN: 200 INJECTION INTRAVENOUS at 19:50:00

## 2025-01-16 RX ADMIN — ROCURONIUM BROMIDE 30 MG: 10 INJECTION, SOLUTION INTRAVENOUS at 15:33:00

## 2025-01-16 RX ADMIN — PHENYLEPHRINE HCL 200 MCG: 10 MG/ML VIAL (ML) INJECTION at 13:48:00

## 2025-01-16 RX ADMIN — PROTAMINE SULFATE 50 MG: 10 INJECTION, SOLUTION INTRAVENOUS at 15:00:00

## 2025-01-16 RX ADMIN — NITROGLYCERIN 15 MCG/MIN: 20 INJECTION INTRAVENOUS at 15:00:00

## 2025-01-16 RX ADMIN — PROTAMINE SULFATE 50 MG: 10 INJECTION, SOLUTION INTRAVENOUS at 14:52:00

## 2025-01-16 RX ADMIN — ROCURONIUM BROMIDE 50 MG: 10 INJECTION, SOLUTION INTRAVENOUS at 16:19:00

## 2025-01-16 RX ADMIN — NITROGLYCERIN 10 MCG/MIN: 20 INJECTION INTRAVENOUS at 15:33:00

## 2025-01-16 RX ADMIN — MIDAZOLAM HYDROCHLORIDE 2 MG: 1 INJECTION INTRAMUSCULAR; INTRAVENOUS at 12:11:00

## 2025-01-16 RX ADMIN — ROCURONIUM BROMIDE 100 MG: 10 INJECTION, SOLUTION INTRAVENOUS at 13:46:00

## 2025-01-16 RX ADMIN — PHENYLEPHRINE HCL 100 MCG: 10 MG/ML VIAL (ML) INJECTION at 13:19:00

## 2025-01-16 RX ADMIN — PROTAMINE SULFATE 50 MG: 10 INJECTION, SOLUTION INTRAVENOUS at 14:57:00

## 2025-01-16 RX ADMIN — METHYLPREDNISOLONE SODIUM SUCCINATE 500 MG: 500 INJECTION INTRAMUSCULAR; INTRAVENOUS at 12:32:00

## 2025-01-16 RX ADMIN — PROTAMINE SULFATE 50 MG: 10 INJECTION, SOLUTION INTRAVENOUS at 14:56:00

## 2025-01-16 RX ADMIN — FAMOTIDINE 20 MG: 10 INJECTION, SOLUTION INTRAVENOUS at 12:11:00

## 2025-01-16 RX ADMIN — DOBUTAMINE HYDROCHLORIDE 2 MCG/KG/MIN: 200 INJECTION INTRAVENOUS at 19:28:00

## 2025-01-16 RX ADMIN — HEPARIN SODIUM 26000 UNITS: 1000 INJECTION, SOLUTION INTRAVENOUS; SUBCUTANEOUS at 13:42:00

## 2025-01-16 RX ADMIN — DEXTROSE MONOHYDRATE AND SODIUM CHLORIDE 80 ML/HR: 5; .45 INJECTION, SOLUTION INTRAVENOUS at 17:36:00

## 2025-01-16 RX ADMIN — SODIUM CHLORIDE: 9 INJECTION, SOLUTION INTRAVENOUS at 12:11:00

## 2025-01-16 RX ADMIN — DEXMEDETOMIDINE HYDROCHLORIDE 0.5 MCG/KG/HR: 4 INJECTION, SOLUTION INTRAVENOUS at 20:02:00

## 2025-01-16 RX ADMIN — Medication 25 ML: at 15:16:00

## 2025-01-16 RX ADMIN — DOBUTAMINE HYDROCHLORIDE 3 MCG/KG/MIN: 200 INJECTION INTRAVENOUS at 15:00:00

## 2025-01-16 RX ADMIN — DIPHENHYDRAMINE HYDROCHLORIDE 50 MG: 50 INJECTION INTRAMUSCULAR; INTRAVENOUS at 12:11:00

## 2025-01-16 RX ADMIN — DOBUTAMINE HYDROCHLORIDE 3 MCG/KG/MIN: 200 INJECTION INTRAVENOUS at 17:30:00

## 2025-01-16 RX ADMIN — NITROGLYCERIN 10 MCG/MIN: 20 INJECTION INTRAVENOUS at 14:58:00

## 2025-01-16 RX ADMIN — ROCURONIUM BROMIDE 100 MG: 10 INJECTION, SOLUTION INTRAVENOUS at 12:15:00

## 2025-01-16 RX ADMIN — VANCOMYCIN HYDROCHLORIDE 1500 MG: at 23:44:00

## 2025-01-16 RX ADMIN — NITROGLYCERIN 5 MCG/MIN: 20 INJECTION INTRAVENOUS at 15:32:00

## 2025-01-16 RX ADMIN — Medication 25 ML: at 15:12:00

## 2025-01-16 RX ADMIN — DEXMEDETOMIDINE HYDROCHLORIDE 0.5 MCG/KG/HR: 4 INJECTION, SOLUTION INTRAVENOUS at 12:36:00

## 2025-01-16 RX ADMIN — METOCLOPRAMIDE HYDROCHLORIDE 10 MG: 5 INJECTION INTRAMUSCULAR; INTRAVENOUS at 14:54:00

## 2025-01-16 RX ADMIN — CHLORHEXIDINE GLUCONATE ORAL RINSE 15 ML: 1.2 SOLUTION DENTAL at 19:17:00

## 2025-01-16 RX ADMIN — POTASSIUM CHLORIDE 20 MEQ: 14.9 INJECTION INTRAVENOUS at 19:26:00

## 2025-01-16 RX ADMIN — PROTAMINE SULFATE 50 MG: 10 INJECTION, SOLUTION INTRAVENOUS at 14:55:00

## 2025-01-16 RX ADMIN — DOBUTAMINE HYDROCHLORIDE 5 MCG/KG/MIN: 200 INJECTION INTRAVENOUS at 14:43:00

## 2025-01-16 NOTE — DISCHARGE INSTRUCTIONS
Your appointment with Geeta Celeste on 2/12 has an address change, we will be in the new building on the hospital campus:  [facility]   [address]   [facility] @ discharge  [phone]    To access the Dr. Coffman discharge instructions video on your home computer:   [email or web address]  Remove steri strips from all incisions on 1/30    Sometimes managing your health at home requires assistance.  The [facility] team has recognized your preference to use [facility].  They can be reached by phone at [phone].  The fax number for your reference is [phone].  A representative from the home health agency will contact you or your family to schedule your first visit.

## 2025-01-16 NOTE — PROGRESS NOTES
[facility] Cardiology  Consultation Note      Juan Pablo Lam Patient Status:  Inpatient    1960 MRN [id number]   Location [facility] CARDIOVASCULAR SURGERY Attending Mathew Grewal MD   Hosp Day # 1 PCP Parker Clancy MD     Reason for consult: CAD    Events: Stable. Denies CP or SOB.     History of Present Illness:  Juan Pablo Lam is a 64 year old male who presented to [facility] on 1/15/2025 for elective cath. Had abnormal nuc stress. Chest pressure at high intensity exertion only, last few months. None at rest. Cath with ostial left main stenosis. .     Medications:  Current Facility-Administered Medications   Medication Dose Route Frequency    coagulation factor VIIa recomb (NovoSeven) injection 5 mg  5 mg Intravenous Once    [Transfer Hold] cetirizine (ZyrTEC) tab 10 mg  10 mg Oral Daily    [Transfer Hold] aspirin chewable tab 81 mg  81 mg Oral Daily    [Transfer Hold] rosuvastatin (Crestor) tab 20 mg  20 mg Oral Nightly     Facility-Administered Medications Ordered in Other Encounters   Medication Dose Route Frequency    ceFAZolin (Ancef) 2g in 10mL IV syringe premix   Intravenous PRN    aminocaproic acid BOLUS FROM BAG infusion   Intravenous PRN    aminocaproic acid (Amicar) 10 g in sodium chloride 0.9% 250 mL infusion   Intravenous Continuous PRN    dexmedeTOMIDine in sodium chloride 0.9% (Precedex) 400 mcg/100mL infusion premix   Intravenous Continuous PRN    diphenhydrAMINE (Benadryl) 50 mg/mL  injection   Intravenous PRN    famotidine (Pepcid) 20 mg/2mL injection   Intravenous PRN    midazolam (Versed) 2 MG/2ML injection   Intravenous PRN    fentaNYL (Sublimaze) 50 mcg/mL injection   Intravenous PRN    propofol (Diprivan) 10 MG/ML injection   Intravenous PRN    rocuronium (Zemuron) 50 mg/5mL injection   Intravenous PRN    sodium chloride 0.9% infusion   Intravenous Continuous PRN    methylPREDNISolone sodium succinate (Solu-MEDROL) injection   Intravenous PRN    vancomycin  (Vancocin) 1 g in sodium chloride 0.9% 250 mL IVPB-ADDV   Intravenous Continuous PRN    propofol (Diprivan) 10 mg/mL infusion premix   Intravenous Continuous PRN    phenylephrine (Rojelio-Synephrine) 10 MG/ML injection   Intravenous PRN    heparin (Porcine) 1000 UNIT/ML injection   Intravenous PRN    DOBUTamine in dextrose 5% (Dobutrex) 500 mg/250mL infusion premix   Intravenous Continuous PRN    metoclopramide (Reglan) 5 mg/mL injection   Intravenous PRN    protamine 10 mg/mL injection   Intravenous PRN    nitroGLYCERIN in dextrose 5% 50 mg/250mL infusion premix   Intravenous Continuous PRN    sodium bicarbonate injection   Intravenous PRN       Past Medical History:    Coronary atherosclerosis    High cholesterol       Past Surgical History:   Procedure Laterality Date    [place] teeth removed      1982       Family History  family history is not on file.    Social History   reports that he has never smoked. He has never used smokeless tobacco. He reports current alcohol use. He reports that he does not use drugs.     Allergies  Allergies[1]    Review of Systems:  As per HPI, otherwise 10 point ROS is negative in detail.    Physical Exam:  Blood pressure 133/71, pulse 68, temperature 98.1 °F (36.7 °C), temperature source Oral, resp. rate 21, height 70\", weight 225 lb 1.6 oz (102.1 kg), SpO2 96%.  Temp (24hrs), Av.9 °F (36.6 °C), Min:97.5 °F (36.4 °C), Max:98.2 °F (36.8 °C)    Wt Readings from Last 3 Encounters:   25 225 lb 1.6 oz (102.1 kg)   22 222 lb (100.7 kg)       General: Awake and alert; in no acute distress  HEENT: Extraocular movements are intact; sclerae are anicteric; scalp is atraumatic  Neck: Supple; no JVD; no carotid bruits  Cardiac: Regular rate and regular rhythm; normal S1 and S2, no murmurs, rubs, or gallops are appreciated  Lungs: Clear to auscultation bilaterally; no accessory muscle use is noted, no wheezes, rhonci or rales  Abdomen: Soft, non-distended, non-tender; bowel sounds  are normoactive  Extremities: Warm, no edema, clubbing or cyanosis; moves all 4 extremities normally, distal pulses intact and equal  Psychiatric: Normal mood and affect; answers questions appropriately  Dermatologic: No rashes; normal skin turgor    Diagnostic testing:    Labs:   Lab Results   Component Value Date    INR 1.49 (H) 01/16/2025    INR 1.05 01/15/2025        Lab Results   Component Value Date    WBC 6.8 01/16/2025    HGB 15.1 01/16/2025    HCT 42.3 01/16/2025    .0 01/16/2025    CREATSERUM 0.85 01/16/2025    BUN 12 01/16/2025     01/16/2025    K 4.0 01/16/2025     01/16/2025    CO2 25.0 01/16/2025    GLU 98 01/16/2025    CA 9.6 01/16/2025    ALB 4.6 01/16/2025    ALKPHO 84 01/15/2025    BILT 1.3 01/15/2025    TP 7.2 01/15/2025    AST 34 01/15/2025    ALT 35 01/15/2025    PTT 27.6 01/16/2025    INR 1.49 01/16/2025    PTP 18.2 01/16/2025    MG 2.3 01/16/2025    PHOS 2.9 01/16/2025       Cardiac diagnostics:    Telemetry: Predominantly sinus rhythm, no malignant arrhythmias     Impression:  64 year old male with stable angina but severe ostial left main stenosis.     Recommendations:  CABG today.  Echo reviewed, normal LV and valve function  ASA, statin, BP control    Thank you for allowing our practice to participate in the care of your patient. Please do not hesitate to contact me if you have any questions.    Mathew Grewal MD  Interventional Cardiology  [facility]  Office: [phone]       [1] No Known Allergies

## 2025-01-16 NOTE — PROGRESS NOTES
[facility] Hospitalist Progress Note     CC: Hospital Follow up    PCP: Parker Clancy MD       Subjective:     CABG today.    OBJECTIVE:    Blood pressure 133/71, pulse 68, temperature 98.1 °F (36.7 °C), temperature source Oral, resp. rate 21, height 5' 10\" (1.778 m), weight 225 lb 1.6 oz (102.1 kg), SpO2 96%.    Temp:  [97.5 °F (36.4 °C)-98.2 °F (36.8 °C)] 98.1 °F (36.7 °C)  Pulse:  [60-93] 68  Resp:  [12-22] 21  BP: (133-156)/(64-79) 133/71  SpO2:  [95 %-99 %] 96 %      Intake/Output:    Intake/Output Summary (Last 24 hours) at 1/16/2025 1352  Last data filed at 1/16/2025 1232  Gross per 24 hour   Intake 750 ml   Output --   Net 750 ml       Last 3 Weights   01/16/25 0530 225 lb 1.6 oz (102.1 kg)   01/15/25 1743 225 lb (102.1 kg)   01/13/25 0842 227 lb (103 kg)   06/16/22 1207 222 lb (100.7 kg)       Exam   Gen: Alert, no acute distress  Heent: Normocephalic, atraumatic, neck supple, EOMI, PERRLA  Pulm: Lungs CTAB, normal respiratory effort  CV:  Regular rate and rhythm, no murmurs/rubs/gallops  Abd: Soft, nontender, nondistended, bowel sounds present  Extremities: No peripheral edema, no clubbing, pulses intact   Skin: No rashes or lesions  Neuro: AOx3, no focal neurologic deficits, CN II-XII grossly intact  Psych: appropriate mood and affect      Data Review:       Labs:     Recent Labs   Lab 01/15/25  1929 01/16/25  1010   RBC 4.78 4.77   HGB 15.2 15.1   HCT 42.8 42.3   MCV 89.5 88.7   MCH 31.8 31.7   MCHC 35.5 35.7   RDW 13.2 13.1   WBC 8.3 6.8   .0 187.0         Recent Labs   Lab 01/15/25  1930 01/16/25  1010   * 98   BUN 13 12   CREATSERUM 0.95 0.85   EGFRCR 88 96   CA 9.3 9.6    138   K 3.9 4.0    107   CO2 24.0 25.0       Recent Labs   Lab 01/15/25  1930 01/16/25  1010   ALT 35  --    AST 34*  --    ALB 4.4 4.6         Imaging:  XR CHEST AP PORTABLE  (CPT=71045)    Result Date: 1/15/2025  CONCLUSION:  Normal cardiac and mediastinal contours.  No pulmonary edema  or focal airspace consolidation.  The pleural spaces are clear.   LOCATION:  [id number]      Dictated by (CST): North Contreras MD on 1/15/2025 at 6:21 PM     Finalized by (CST): North Contreras MD on 1/15/2025 at 6:22 PM          Meds:      [Transfer Hold] cetirizine  10 mg Oral Daily    [Transfer Hold] aspirin  81 mg Oral Daily    [Transfer Hold] rosuvastatin  20 mg Oral Nightly         EPINEPHrine (Adrenalin) 5 mg in sodium chloride 0.9% 250 mL infusion    insulin regular    ceFAZolin Sodium 2 g in sodium chloride 0.9% 2,000 mL irrigation    gelatin adsorbable    heparin in lactated ringers    verapamil in plasmalyte       Assessment/Plan:     64 year old male with a history of hyperlipidemia and CAD who underwent cardiac cath on 1/15 with Dr. Grewal showing critical ostial left main stenosis with good distal targets for bypass.  Patient will be undergoing CABG on 1/16.     CAD, critical ostial left main stenosis  -N.p.o. CABG today  -Echocardiogram     Hyperlipidemia  -Statin     Dispo: CABG today.     Outpatient records reviewed. Questions/concerns were discussed with patient and/or family by bedside.   A total of 51 minutes were taken with patient and coordinating care.     DO Megha Haynes [facility]  Hospitalist  Contact via [employer]    Supplementary Documentation:   DVT Mechanical Prophylaxis:        DVT Pharmacologic Prophylaxis   Medication    heparin in lactated ringers ((Porcine)) 6500 UNITS - 1 L for CVOR procedural use     Facility-Administered Medications Ordered in Other Encounters (Includes Only Anticoagulants)   Medication    heparin (Porcine) 1000 UNIT/ML injection      DVT Pharmacologic prophylaxis: Aspirin 162 mg         Code Status: Not on file  Corbett: Corbett catheter in place  Corbett Duration (in days): 1  Central line:    TAN:

## 2025-01-16 NOTE — ANESTHESIA PROCEDURE NOTES
Central Line    Date/Time: 1/16/2025 12:23 PM    Performed by: Elver Noel MD  Authorized by: Elver Noel MD    General Information and Staff    Procedure Start:  1/16/2025 12:23 PM  Procedure End:  1/16/2025 12:30 PM  Anesthesiologist:  Elver Noel MD  Performed by:  Anesthesiologist  Patient Location:  OR  Indication: central venous access and CVP monitoring    Site Identification: real time ultrasound guided and image stored and retrievable    Preanesthetic Checklist: 2 patient identifiers, IV checked, risks and benefits discussed, monitors and equipment checked, pre-op evaluation, timeout performed, anesthesia consent and sterile technique used    Procedure Detail    Patient Position:  Trendelenburg  Laterality:  Right  Site:  Internal jugular  Prep:  Chloraprep  Catheter Size:  9 Fr  Catheter Type:  MAC introducer  Number of Lumens:  Double lumen  Procedure Detail: target vein identified, needle advanced into vein and blood aspirated and guidewire advanced into vein    Seldinger Technique?: Yes    Intravenous Verification: verified by ultrasound and venous blood return    Post Insertion: all ports aspirated, all ports flushed easily, guidewire was removed intact, line was sutured in place and dressing was applied      Assessment    Events: patient tolerated procedure well with no complications      PA Catheter Placement    PA Catheter Placed?: Yes    PA Catheter Type:  Oximetric  PA Catheter Size:  8  Laterality:  Right  Site:  Internal jugular  Placement Confirmation: pressure tracing changes and verified by ZOE    PA Catheter Depth (cm):  50  Events: patient tolerated procedure well with no complications      Additional Comments

## 2025-01-16 NOTE — ANESTHESIA POSTPROCEDURE EVALUATION
[facility]    Juan Pablo Lam Patient Status:  Inpatient   Age/Gender 64 year old male MRN [id number]   Location [facility] 6NE-A Attending Mathew Grewal MD   Hosp Day # 1 PCP Parker Clancy MD       Anesthesia Post-op Note    CORONARY ARTERY BYPASS GRAFT TIMES TWO USING LEFT INTERNAL MAMMARY ARTERY AND LEFT SAPHENOUS ENDOVEIN HARVEST AND INTRA OPERATIVE TRANSESOPHAGEAL ENCHOCARDIOGRAM    Procedure Summary       Date: 01/16/25 Room / Location:  CVOR 02 /  CVOR    Anesthesia Start: 1211 Anesthesia Stop:     Procedure: CORONARY ARTERY BYPASS GRAFT TIMES TWO USING LEFT INTERNAL MAMMARY ARTERY AND LEFT SAPHENOUS ENDOVEIN HARVEST AND INTRA OPERATIVE TRANSESOPHAGEAL ENCHOCARDIOGRAM Diagnosis: (coronary artery disease)    Surgeons: Cash Coffman MD Anesthesiologist: Elver Noel MD    Anesthesia Type: general ASA Status: 3            Anesthesia Type: general    Vitals Value Taken Time   /72 01/16/25 1731   Temp 97 01/16/25 1731   Pulse 96 01/16/25 1731   Resp 10 01/16/25 1731   SpO2 99 % 01/16/25 1731   Vitals shown include unfiled device data.    [facility]    Patient Location: ICU    Anesthesia Type: general    Airway Patency: intubated    Postop Pain Control: Other: (Intubated and sedated)    Mental Status: Other: (Intubated and sedated)    Nausea/Vomiting: Other: (Intubated and sedated)    Cardiopulmonary/Hydration status: stable euvolemic    Complications: no apparent anesthesia related complications    Postop vital signs: stable    Dental Exam: Unchanged from Preop    Sign out given to ICU staff.

## 2025-01-16 NOTE — CM/SW NOTE
LAURITA noted and acknowledged order for \"surgery\".    Per chart review, plan is for pt to have a CABG today.    Per protocol, pt will need to be set up with HH services for after surgery.    LAURITA sent HH referral in [place]. Awaiting responses. Will need to provide pt with options list when available.     to remain available for support and/or discharge planning.    HOSSEIN Roe  Discharge Planner  [phone]

## 2025-01-16 NOTE — PROGRESS NOTES
Anesthesia Ventilator Management    Patient is s/p 2V CABG   POD#0.  Patient is currently sedated and intubated.  Vent settings: PRVC 550/14/60/5  ABG, CXR pending    Dex@ 0.5  Prop@ 25  @ 3    Will wean FiO2 as tolerated.  Plan for extubation after CPAP trial.

## 2025-01-16 NOTE — PLAN OF CARE
Assumed care of patient 1930     Patient alert and oriented x4. On RA with adequate sats. NSR on tele. Continent bowel and bladder, voids in BR independently. Denies pain at this time. Up SBA. Call light within reach. Fall precautions in place. Makes needs known. Plan of care discussed, questions answered    Plan  CABG   Problem: Patient/Family Goals  Goal: Patient/Family Long Term Goal  Description: Patient's Long Term Goal: Stay Healthy    Interventions:  - Resume home med regimen  -Follow up with providers  - See additional Care Plan goals for specific interventions  Outcome: Progressing  Goal: Patient/Family Short Term Goal  Description: Patient's Short Term Goal: Discharge from hospital    Interventions:   - CABG 1/16  - See additional Care Plan goals for specific interventions  Outcome: Progressing     Problem: CARDIOVASCULAR - ADULT  Goal: Maintains optimal cardiac output and hemodynamic stability  Description: INTERVENTIONS:  - Monitor vital signs, rhythm, and trends  - Monitor for bleeding, hypotension and signs of decreased cardiac output  - Evaluate effectiveness of vasoactive medications to optimize hemodynamic stability  - Monitor arterial and/or venous puncture sites for bleeding and/or hematoma  - Assess quality of pulses, skin color and temperature  - Assess for signs of decreased coronary artery perfusion - ex. Angina  - Evaluate fluid balance, assess for edema, trend weights  Outcome: Progressing  Goal: Absence of cardiac arrhythmias or at baseline  Description: INTERVENTIONS:  - Continuous cardiac monitoring, monitor vital signs, obtain 12 lead EKG if indicated  - Evaluate effectiveness of antiarrhythmic and heart rate control medications as ordered  - Initiate emergency measures for life threatening arrhythmias  - Monitor electrolytes and administer replacement therapy as ordered  Outcome: Progressing

## 2025-01-16 NOTE — CONSULTS
[facility] Cardiology  Consultation Note      Juan Pablo Lam Patient Status:  Inpatient    1960 MRN [id number]   Location [facility] CARDIOVASCULAR SURGERY Attending Mathew Grewal MD   Hosp Day # 1 PCP Parkre Clancy MD     Reason for consult: CAD    History of Present Illness:  Juan Pablo Lam is a 64 year old male who presented to [facility] on 1/15/2025 for elective cath. Had abnormal nuc stress. Chest pressure at high intensity exertion only, last few months. None at rest. Cath with ostial left main stenosis. .     Medications:  Current Facility-Administered Medications   Medication Dose Route Frequency    coagulation factor VIIa recomb (NovoSeven) injection 5 mg  5 mg Intravenous Once    [Transfer Hold] cetirizine (ZyrTEC) tab 10 mg  10 mg Oral Daily    [Transfer Hold] aspirin chewable tab 81 mg  81 mg Oral Daily    [Transfer Hold] rosuvastatin (Crestor) tab 20 mg  20 mg Oral Nightly     Facility-Administered Medications Ordered in Other Encounters   Medication Dose Route Frequency    ceFAZolin (Ancef) 2g in 10mL IV syringe premix   Intravenous PRN    aminocaproic acid BOLUS FROM BAG infusion   Intravenous PRN    aminocaproic acid (Amicar) 10 g in sodium chloride 0.9% 250 mL infusion   Intravenous Continuous PRN    dexmedeTOMIDine in sodium chloride 0.9% (Precedex) 400 mcg/100mL infusion premix   Intravenous Continuous PRN    diphenhydrAMINE (Benadryl) 50 mg/mL  injection   Intravenous PRN    famotidine (Pepcid) 20 mg/2mL injection   Intravenous PRN    midazolam (Versed) 2 MG/2ML injection   Intravenous PRN    fentaNYL (Sublimaze) 50 mcg/mL injection   Intravenous PRN    propofol (Diprivan) 10 MG/ML injection   Intravenous PRN    rocuronium (Zemuron) 50 mg/5mL injection   Intravenous PRN    sodium chloride 0.9% infusion   Intravenous Continuous PRN    methylPREDNISolone sodium succinate (Solu-MEDROL) injection   Intravenous PRN    vancomycin (Vancocin) 1 g in sodium chloride 0.9%  250 mL IVPB-ADDV   Intravenous Continuous PRN    propofol (Diprivan) 10 mg/mL infusion premix   Intravenous Continuous PRN    phenylephrine (Rojelio-Synephrine) 10 MG/ML injection   Intravenous PRN    heparin (Porcine) 1000 UNIT/ML injection   Intravenous PRN    DOBUTamine in dextrose 5% (Dobutrex) 500 mg/250mL infusion premix   Intravenous Continuous PRN    metoclopramide (Reglan) 5 mg/mL injection   Intravenous PRN    protamine 10 mg/mL injection   Intravenous PRN    nitroGLYCERIN in dextrose 5% 50 mg/250mL infusion premix   Intravenous Continuous PRN    sodium bicarbonate injection   Intravenous PRN       Past Medical History:    Coronary atherosclerosis    High cholesterol       Past Surgical History:   Procedure Laterality Date    [place] teeth removed             Family History  family history is not on file.    Social History   reports that he has never smoked. He has never used smokeless tobacco. He reports current alcohol use. He reports that he does not use drugs.     Allergies  Allergies[1]    Review of Systems:  As per HPI, otherwise 10 point ROS is negative in detail.    Physical Exam:  Blood pressure 133/71, pulse 68, temperature 98.1 °F (36.7 °C), temperature source Oral, resp. rate 21, height 70\", weight 225 lb 1.6 oz (102.1 kg), SpO2 96%.  Temp (24hrs), Av.9 °F (36.6 °C), Min:97.5 °F (36.4 °C), Max:98.2 °F (36.8 °C)    Wt Readings from Last 3 Encounters:   25 225 lb 1.6 oz (102.1 kg)   22 222 lb (100.7 kg)       General: Awake and alert; in no acute distress  HEENT: Extraocular movements are intact; sclerae are anicteric; scalp is atraumatic  Neck: Supple; no JVD; no carotid bruits  Cardiac: Regular rate and regular rhythm; normal S1 and S2, no murmurs, rubs, or gallops are appreciated  Lungs: Clear to auscultation bilaterally; no accessory muscle use is noted, no wheezes, rhonci or rales  Abdomen: Soft, non-distended, non-tender; bowel sounds are normoactive  Extremities: Warm, no  edema, clubbing or cyanosis; moves all 4 extremities normally, distal pulses intact and equal  Psychiatric: Normal mood and affect; answers questions appropriately  Dermatologic: No rashes; normal skin turgor    Diagnostic testing:    Labs:   Lab Results   Component Value Date    INR 1.49 (H) 01/16/2025    INR 1.05 01/15/2025        Lab Results   Component Value Date    WBC 6.8 01/16/2025    HGB 15.1 01/16/2025    HCT 42.3 01/16/2025    .0 01/16/2025    CREATSERUM 0.85 01/16/2025    BUN 12 01/16/2025     01/16/2025    K 4.0 01/16/2025     01/16/2025    CO2 25.0 01/16/2025    GLU 98 01/16/2025    CA 9.6 01/16/2025    ALB 4.6 01/16/2025    ALKPHO 84 01/15/2025    BILT 1.3 01/15/2025    TP 7.2 01/15/2025    AST 34 01/15/2025    ALT 35 01/15/2025    PTT 27.6 01/16/2025    INR 1.49 01/16/2025    PTP 18.2 01/16/2025    MG 2.3 01/16/2025    PHOS 2.9 01/16/2025       Cardiac diagnostics:        Impression:  64 year old male with stable angina but severe ostial left main stenosis.     Recommendations:  Admit for CABG.  Check echo  ASA, statin, BP control    Thank you for allowing our practice to participate in the care of your patient. Please do not hesitate to contact me if you have any questions.    Mathew Grewal MD  Interventional Cardiology  [facility]  Office: [phone]         [1] No Known Allergies

## 2025-01-16 NOTE — ANESTHESIA PROCEDURE NOTES
Procedure Performed: ZOE       Start Time:  12/30/2025 12:50 PM       End Time:   1/16/2025 3:30 PM    Preanesthesia Checklist:  Patient identified, IV assessed, risks and benefits discussed, monitors and equipment assessed, procedure being performed at surgeon's request and anesthesia consent obtained.    General Procedure Information  Diagnostic Indications for Echo:  assessment of ascending aorta and hemodynamic monitoring  Physician Requesting Echo: Cash Coffman MD  Location performed:  OR  Intubated  Bite block placed  Heart visualized  Probe Insertion:  Easy  Probe Type:  Multiplane  Modalities:  2D only, color flow mapping, pulse wave Doppler and continuous wave Doppler    Echocardiographic and Doppler Measurements    Ventricles    Right Ventricle:  Cavity size normal.  Hypertrophy not present.  Thrombus not present.  Global function normal.    Left Ventricle:  Cavity size normal.  Hypertrophy present.  Thrombus not present.  Global Function normal.  Ejection Fraction 55%.      Ventricular Regional Function:  1- Basal Anteroseptal:  normal  2- Basal Anterior:  normal  3- Basal Anterolateral:  normal  4- Basal Inferolateral:  normal  5- Basal Inferior:  normal  6- Basal Inferoseptal:  normal  7- Mid Anteroseptal:  normal  8- Mid Anterior:  normal  9- Mid Anterolateral:  normal  10- Mid Inferolateral:  normal  11- Mid Inferior:  normal  12- Mid Inferoseptal:  normal  13- Apical Anterior:  normal  14- Apical Lateral:  normal  15- Apical Inferior:  normal  16- Apical Septal:  normal  17- [place]:  normal      Valves    Aortic Valve:  Annulus normal.  Stenosis not present.  Regurgitation +1.  Leaflets normal.  Leaflet motions normal.      Mitral Valve:  Annulus normal.  Regurgitation +1.  Leaflets normal.  Leaflet motions normal.      Tricuspid Valve:  Annulus normal.  Stenosis not present.  Regurgitation +1.  Leaflets normal.        Aorta    Ascending Aorta:  Size normal.  Dissection not present.  Plaque thickness  less than 3 mm.  Mobile plaque not present.    Aortic Arch:  Size normal.  Dissection not present.  Plaque thickness less than 3 mm.  Mobile plaque not present.    Descending Aorta:  Size normal.  Dissection not present.  Plaque thickness less than 3 mm.  Mobile plaque not present.        Atria    Right Atrium:  Size normal.  Spontaneous echo contrast not present.  Thrombus not present.  Tumor not present.  Device present.    Left Atrium:  Size normal.  Spontaneous echo contrast not present.  Thrombus not present.  Tumor not present.  Device not present.    Left atrial appendage normal.      Septa    Atrial Septum:  Intra-atrial septal morphology normal.      Ventricular Septum:  Intra-ventricular septum morphology normal.      Diastolic Function Measurements:  Diastolic Dysfunction Grade= I  E=  ms  A=  ms  E/A Ratio=   DT=  ms  S/D=   IVRT= ms    Other Findings  Pericardium:  normal  Pleural Effusion:  none  Pulmonary Arteries:  normal  Pulmonary Venous Flow:  normal    Anesthesia Information  Performed Personally  Anesthesiologist:  Elver Noel MD      Post    Ventricular FXN:  Global FXN: Improved   Regional FXN: Improved  Valve FXN:  Native Valve:No change            Comments: No aortic dissection    Complications:None

## 2025-01-16 NOTE — ANESTHESIA PROCEDURE NOTES
Airway  Date/Time: 1/16/2025 12:18 PM  Urgency: elective    Airway not difficult    General Information and Staff    Patient location during procedure: OR  Anesthesiologist: Elver Noel MD  Performed: anesthesiologist   Performed by: Elver Noel MD  Authorized by: Elver Noel MD      Indications and Patient Condition  Indications for airway management: anesthesia  Sedation level: deep  Preoxygenated: yes  Patient position: sniffing  Mask difficulty assessment: 1 - vent by mask    Final Airway Details  Final airway type: endotracheal airway      Successful airway: ETT  Cuffed: yes   Successful intubation technique: direct laryngoscopy  Facilitating devices/methods: anterior pressure/BURP  Endotracheal tube insertion site: oral  Blade: Radha  Blade size: #3  ETT size (mm): 8.0    Cormack-Lehane Classification: grade IIA - partial view of glottis  Placement verified by: capnometry   Cuff volume (mL): 9  Measured from: lips  ETT to lips (cm): 22  Number of attempts at approach: 1

## 2025-01-16 NOTE — PLAN OF CARE
Assumed care of patient @ 0730 patient resting in bed, AOX4, reports no pain. Lung sounds clear bilaterally, O2 saturation adequate on room air. No complaints of shortness of breath or chest pain at this time. Sinus rhythm on tele, S1-S2 present, no adventitious sounds noted. Bowel sounds present and active in all quadrants, last bowel movement today. Patient voiding without issue. Pt ambulating with steady gait. Right wrist soft, no hematoma.    Plan of Care: Increase activity. CABG today.     Discussed plan of care with patient, wife updated via telephone, verbalized understanding. Call light within reach.     Problem: Patient/Family Goals  Goal: Patient/Family Long Term Goal  Description: Patient's Long Term Goal: Stay Healthy    Interventions:  - Resume home med regimen  -Follow up with providers  - See additional Care Plan goals for specific interventions  Outcome: Progressing  Goal: Patient/Family Short Term Goal  Description: Patient's Short Term Goal: Discharge from hospital    Interventions:   - CABG 1/16  - See additional Care Plan goals for specific interventions  Outcome: Progressing     Problem: CARDIOVASCULAR - ADULT  Goal: Maintains optimal cardiac output and hemodynamic stability  Description: INTERVENTIONS:  - Monitor vital signs, rhythm, and trends  - Monitor for bleeding, hypotension and signs of decreased cardiac output  - Evaluate effectiveness of vasoactive medications to optimize hemodynamic stability  - Monitor arterial and/or venous puncture sites for bleeding and/or hematoma  - Assess quality of pulses, skin color and temperature  - Assess for signs of decreased coronary artery perfusion - ex. Angina  - Evaluate fluid balance, assess for edema, trend weights  Outcome: Progressing  Goal: Absence of cardiac arrhythmias or at baseline  Description: INTERVENTIONS:  - Continuous cardiac monitoring, monitor vital signs, obtain 12 lead EKG if indicated  - Evaluate effectiveness of antiarrhythmic  and heart rate control medications as ordered  - Initiate emergency measures for life threatening arrhythmias  - Monitor electrolytes and administer replacement therapy as ordered  Outcome: Progressing

## 2025-01-16 NOTE — PLAN OF CARE
Received patient from cath lab holding approx 1745, right wrist with gauze and coband, soft, no hematoma, AOX4, reports no pain. Lung sounds clear, but diminished bilaterally, O2 saturation adequate on room air. No complaints of shortness of breath or chest pain at this time. Sinus rhythm, no adventitious sounds noted. Bowel sounds present and active in all quadrants, last bowel movement yesterday per patient. Patient voiding without issue. Pt ambulating with steady gait.     Plan of Care: EKG. UA to be collected. CABG tomorrow.    Discussed plan of care with patient, wife at bedside, verbalized understanding. Call light within reach.

## 2025-01-16 NOTE — ANESTHESIA PREPROCEDURE EVALUATION
PRE-OP EVALUATION    Patient Name: Juan Pablo Lam    Admit Diagnosis: Abnormal stress test [R94.39]    Pre-op Diagnosis: coronary artery disease    CORONARY ARTERY BYPASS GRAFT    Anesthesia Procedure: CORONARY ARTERY BYPASS GRAFT    Surgeons and Role:     * Cash Coffman MD - Primary    Pre-op vitals reviewed.  Temp: 98.1 °F (36.7 °C)  Pulse: 68  Resp: 21  BP: 133/71  SpO2: 96 %  Body mass index is 32.3 kg/m².    Current medications reviewed.  Hospital Medications:   [COMPLETED] Perflutren Lipid Microsphere (DEFINITY) 6.52 MG/ML injection 1.5 mL  1.5 mL Intravenous ONCE PRN    EPINEPHrine (Adrenalin) 5 mg in sodium chloride 0.9% 250 mL infusion  1-10 mcg/min Intravenous PRN    insulin regular human (Novolin R, Humulin R) 100 Units in sodium chloride 0.9% 100 mL standard infusion (100 mL)  1-40 Units/hr Intravenous PRN    [COMPLETED] lidocaine PF (Xylocaine-MPF) 1 % injection        [COMPLETED] verapamil (Isoptin) 2.5 mg/mL injection        [COMPLETED] heparin (Porcine) 5000 UNIT/ML injection        [COMPLETED] Nitroglycerin in D5W 200-5 MCG/ML-% injection        [COMPLETED] iopamidol 76% (ISOVUE-370) injection for power injector  200 mL Injection ONCE PRN    [COMPLETED] fentaNYL (Sublimaze) 50 mcg/mL injection        [COMPLETED] midazolam (Versed) 2 MG/2ML injection        [COMPLETED] sodium chloride 0.9 % IV bolus 500 mL  500 mL Intravenous Once    [] melatonin tab 3 mg  3 mg Oral Once PRN    [COMPLETED] mupirocin (Bactroban) 2% nasal ointment 1 Application  1 Application Nasal Now then every 0500    [COMPLETED] metoprolol tartrate (Lopressor) partial tab 12.5 mg  12.5 mg Oral Once    cetirizine (ZyrTEC) tab 10 mg  10 mg Oral Daily    aspirin chewable tab 81 mg  81 mg Oral Daily    rosuvastatin (Crestor) tab 20 mg  20 mg Oral Nightly       Outpatient Medications:   Prescriptions Prior to Admission[1]    Allergies: Patient has no known allergies.      Anesthesia Evaluation    Patient summary  reviewed.    Anesthetic Complications  (-) history of anesthetic complications         GI/Hepatic/Renal                                 Cardiovascular  Comment: indings:  1. Left main: Ostial >90% stenosis, likely heavily calcified nodular plaque  2. LAD: Transapical vessel, mild plaquing mid < 50%, luminal irregularities several smaller diagonals  3. Left circumflex: Supplies a large principal OM branch, luminal irregularities   4. RCA: Dominant to PDA. Large PDA/RPL system with several branches, mild plaquing only.   5. LV: EF 60%, no WMA, no sig MR, LVEDP 13 mmHg. No LV-Ao gradient on pullback.          ECG reviewed.               (+) hyperlipidemia  (+) CAD                                Endo/Other                                  Pulmonary    Negative pulmonary ROS.                       Neuro/Psych                                      Past Surgical History:   Procedure Laterality Date    [place] teeth removed      1982     Social History     Socioeconomic History    Marital status:    Tobacco Use    Smoking status: Never    Smokeless tobacco: Never   Vaping Use    Vaping status: Never Used   Substance and Sexual Activity    Alcohol use: Yes     Comment: Socially    Drug use: Never     History   Drug Use Unknown     Available pre-op labs reviewed.  Lab Results   Component Value Date    WBC 6.8 01/16/2025    RBC 4.77 01/16/2025    HGB 15.1 01/16/2025    HCT 42.3 01/16/2025    MCV 88.7 01/16/2025    MCH 31.7 01/16/2025    MCHC 35.7 01/16/2025    RDW 13.1 01/16/2025    .0 01/16/2025     Lab Results   Component Value Date     01/16/2025    K 4.0 01/16/2025     01/16/2025    CO2 25.0 01/16/2025    BUN 12 01/16/2025    CREATSERUM 0.85 01/16/2025    GLU 98 01/16/2025    CA 9.6 01/16/2025     Lab Results   Component Value Date    INR 1.05 01/15/2025         Airway      Mallampati: II  Mouth opening: 3 FB  TM distance: 4 - 6 cm  Neck ROM: full Cardiovascular      Rhythm: regular  Rate: normal      Dental  Comment: No loose teeth reported by patient           Pulmonary      Breath sounds clear to auscultation bilaterally.               Other findings              ASA: 3   Plan: general  NPO status verified and patient meets guidelines.  Patient has taken beta blockers in last 24 hours.  Post-procedure pain management plan discussed with surgeon and patient.    Comment: We discussed GA w/ETT and postoperative ventilation and CCU admission.  We discussed placement of additional lines including arterial catheter, additional PIVs, central venous catheter/PAC and intraop ZOE.  Patient understands extubation will occur once the overall hemodynamic status is stable. Discussed rare risk of dental trauma from intubation, scratchy throat, and postop nausea and vomiting.  Discussed possible use of blood products. We discussed postoperative usage of sedatives, analgesics and anxiolytics.  All questions re: anesthetic management were answered.    Plan/risks discussed with: patient  Use of blood product(s) discussed with: patient              Present on Admission:  **None**             [1]   Medications Prior to Admission   Medication Sig Dispense Refill Last Dose/Taking    OMEGA-3 FATTY ACIDS OR Take 300 mg by mouth daily.   1/14/2025 at  9:00 AM    Multiple Vitamins-Minerals (PX MENS MULTIVITAMINS OR) Take 1 tablet by mouth daily.   1/14/2025 at  9:00 AM    B Complex-C-Folic Acid Oral Tab Take 1 tablet by mouth daily.   1/14/2025 at  9:00 AM    MISC NATURAL PRODUCTS OR Take by mouth daily. Green tea   1/14/2025 at  9:00 AM    Cetirizine HCl (ZYRTEC) 10 MG Oral Chew Tab Chew 1 tablet (10 mg total) by mouth daily.   1/15/2025 at  9:00 AM    Apoaequorin (PREVAGEN OR) Take by mouth daily. Regular strength   1/14/2025 at  9:00 AM    rosuvastatin 20 MG Oral Tab Take 1 tablet (20 mg total) by mouth nightly.   1/14/2025 at  9:00 PM    aspirin 81 MG Oral Chew Tab Chew 1 tablet (81 mg total) by mouth daily.   1/15/2025 at  9:00  AM

## 2025-01-16 NOTE — ANESTHESIA PROCEDURE NOTES
Arterial Line    Date/Time: 1/16/2025 12:19 PM    Performed by: Elver Noel MD  Authorized by: Elver Noel MD    General Information and Staff    Procedure Start:  1/16/2025 12:19 PM  Procedure End:  1/16/2025 12:22 PM  Anesthesiologist:  Elver Noel MD  Performed By:  Anesthesiologist  Patient Location:  OR  Indication: continuous blood pressure monitoring and blood sampling needed    Site Identification: surface landmarks    Preanesthetic Checklist: 2 patient identifiers, IV checked, risks and benefits discussed, monitors and equipment checked, pre-op evaluation, timeout performed, anesthesia consent and sterile technique used    Procedure Details    Catheter Size:  20 G  Catheter Length:  1 and 3/4 inch  Catheter Type:  Arrow  Seldinger Technique?: Yes    Laterality:  Left  Site:  Radial artery  Site Prep: chlorhexidine    Line Secured:  Tape and Tegaderm    Assessment    Events: patient tolerated procedure well with no complications      Medications  1/16/2025 12:19 PM      Additional Comments

## 2025-01-16 NOTE — CONSULTS
General Medicine Consult      Reason for consult:    Consulted by: Mathew Grewal MD    PCP: Parker Clancy MD      History of Present Illness: Patient is a 64 year old male with a history of hyperlipidemia and CAD who underwent cardiac cath on 1/15 with Dr. Grewal showing critical ostial left main stenosis with good distal targets for bypass.  Patient will be undergoing CABG on 1/16.      PMH:  Past Medical History:    Coronary atherosclerosis    High cholesterol        PSH:  Past Surgical History:   Procedure Laterality Date    [place] teeth removed      1982        Home Medications:  Medications Taking[1]    Scheduled Medication:   mupirocin  1 Application Nasal Now then every 0500    [START ON 1/16/2025] metoprolol tartrate  12.5 mg Oral Once    Cetirizine HCl  10 mg Oral Daily    aspirin  81 mg Oral Daily    rosuvastatin  20 mg Oral Nightly     Continuous Infusing Medication:    PRN Medication:  melatonin     ALL:  Allergies[2]     Soc Hx:  Social History     Tobacco Use    Smoking status: Never    Smokeless tobacco: Never   Substance Use Topics    Alcohol use: Yes     Comment: Socially        Fam Hx  History reviewed. No pertinent family history.    Review of Systems  Comprehensive ROS reviewed and negative except for what's stated above.  Including negative for chest pain, shortness of breath, syncope.       OBJECTIVE:  /70 (BP Location: Left arm)   Pulse 77   Temp 97.5 °F (36.4 °C) (Oral)   Resp 20   Ht 5' 10\" (1.778 m)   Wt 225 lb (102.1 kg)   SpO2 97%   BMI 32.28 kg/m²     Exam   Gen: Alert, no acute distress  Heent: Normocephalic, atraumatic, neck supple, EOMI, PERRLA  Pulm: Lungs CTAB, normal respiratory effort  CV:  Regular rate and rhythm, no murmurs/rubs/gallops  Abd: Soft, nontender, nondistended, bowel sounds present  Extremities: No peripheral edema, no clubbing, pulses intact   Skin: No rashes or lesions  Neuro: AOx3, no focal neurologic deficits, CN II-XII grossly intact  Psych:  appropriate mood and affect     Diagnostics:   CBC/Chem  Recent Labs   Lab 01/15/25  1929   WBC 8.3   HGB 15.2   MCV 89.5   .0   INR 1.05       Recent Labs   Lab 01/15/25  1930      K 3.9      CO2 24.0   BUN 13   CREATSERUM 0.95   *   CA 9.3       Recent Labs   Lab 01/15/25  1930   ALT 35   AST 34*   ALB 4.4       Radiology:   All imaging personally reviewed      XR CHEST AP PORTABLE  (CPT=71045)    Result Date: 1/15/2025  PROCEDURE:  XR CHEST AP PORTABLE  (CPT=71045)  TECHNIQUE:  AP chest radiograph was obtained.  COMPARISON:  None.  INDICATIONS:  pre-op CABG  PATIENT STATED HISTORY: (As transcribed by Technologist)  Patient offered no additional history at this time.    FINDINGS:             CONCLUSION:  Normal cardiac and mediastinal contours.  No pulmonary edema or focal airspace consolidation.  The pleural spaces are clear.   LOCATION:  [id number]      Dictated by (CST): North Contreras MD on 1/15/2025 at 6:21 PM     Finalized by (CST): North Contreras MD on 1/15/2025 at 6:22 PM       CATH ANGIO    Result Date: 1/15/2025  This exam has been completed. Please refer to Notes for the results to this procedure.         ASSESSMENT / PLAN:    64 year old male with a history of hyperlipidemia and CAD who underwent cardiac cath on 1/15 with Dr. Grewal showing critical ostial left main stenosis with good distal targets for bypass.  Patient will be undergoing CABG on 1/16.    CAD, critical ostial left main stenosis  -N.p.o. after midnight, plan for CABG tomorrow with Dr. Oreilly  -Echocardiogram    Hyperlipidemia  -Statin    Dispo: CABG tomorrow    Outpatient or previous hospital records reviewed. Questions/concerns were discussed with patient and/or family by bedside.  Discussed w/ cardiology.     DO [facility]  Hospitalist  Contact via [employer]/[address]      Advanced Care Planning    While discussing goals of care with the patient and their family, patient voluntarily  participated in an advanced care planning discussion. The following was discussed: Full code.    16 minutes were spent in discussing advanced care planning. This time was exclusive of the documented time for this visit.        Supplementary Documentation:   DVT Mechanical Prophylaxis:        DVT Pharmacologic Prophylaxis   Medication   None      DVT Pharmacologic prophylaxis: Aspirin 162 mg         Code Status: Not on file  Corbett: No urinary catheter in place  Corbett Duration (in days):   Central line:    TAN:                                        [1]   Outpatient Medications Marked as Taking for the 1/15/25 encounter (Hospital Encounter) with  IVS  HYBRID   Medication Sig Dispense Refill    OMEGA-3 FATTY ACIDS OR Take 300 mg by mouth daily.      Multiple Vitamins-Minerals (PX MENS MULTIVITAMINS OR) Take 1 tablet by mouth daily.      B Complex-C-Folic Acid Oral Tab Take 1 tablet by mouth daily.      MISC NATURAL PRODUCTS OR Take by mouth daily. Green tea      Cetirizine HCl (ZYRTEC) 10 MG Oral Chew Tab Chew 1 tablet (10 mg total) by mouth daily.      Apoaequorin (PREVAGEN OR) Take by mouth daily. Regular strength      rosuvastatin 20 MG Oral Tab Take 1 tablet (20 mg total) by mouth nightly.      aspirin 81 MG Oral Chew Tab Chew 1 tablet (81 mg total) by mouth daily.     [2] No Known Allergies

## 2025-01-17 ENCOUNTER — APPOINTMENT (OUTPATIENT)
Dept: GENERAL RADIOLOGY | Facility: HOSPITAL | Age: 65
End: 2025-01-17
Attending: PHYSICIAN ASSISTANT
Payer: COMMERCIAL

## 2025-01-17 ENCOUNTER — APPOINTMENT (OUTPATIENT)
Dept: GENERAL RADIOLOGY | Facility: HOSPITAL | Age: 65
End: 2025-01-17
Attending: THORACIC SURGERY (CARDIOTHORACIC VASCULAR SURGERY)
Payer: COMMERCIAL

## 2025-01-17 LAB
ALBUMIN SERPL-MCNC: 3.5 G/DL (ref 3.2–4.8)
ANION GAP SERPL CALC-SCNC: 9 MMOL/L (ref 0–18)
ATRIAL RATE: 72 BPM
ATRIAL RATE: 93 BPM
BASE EXCESS BLDA CALC-SCNC: 1.6 MMOL/L (ref ?–2)
BASOPHILS # BLD AUTO: 0.01 X10(3) UL (ref 0–0.2)
BASOPHILS NFR BLD AUTO: 0.1 %
BLOOD TYPE BARCODE: 5100
BLOOD TYPE BARCODE: 9500
BODY TEMPERATURE: 98.6 F
BUN BLD-MCNC: 15 MG/DL (ref 9–23)
BUN BLD-MCNC: 15 MG/DL (ref 9–23)
CA-I BLD-SCNC: 1.2 MMOL/L (ref 0.95–1.32)
CALCIUM BLD-MCNC: 8.4 MG/DL (ref 8.7–10.6)
CALCIUM BLD-MCNC: 8.4 MG/DL (ref 8.7–10.6)
CHLORIDE SERPL-SCNC: 111 MMOL/L (ref 98–112)
CHLORIDE SERPL-SCNC: 111 MMOL/L (ref 98–112)
CO2 SERPL-SCNC: 24 MMOL/L (ref 21–32)
CO2 SERPL-SCNC: 24 MMOL/L (ref 21–32)
COHGB MFR BLD: 1.2 % SAT (ref 0–3)
CREAT BLD-MCNC: 0.92 MG/DL
CREAT BLD-MCNC: 0.92 MG/DL
EGFRCR SERPLBLD CKD-EPI 2021: 92 ML/MIN/1.73M2 (ref 60–?)
EGFRCR SERPLBLD CKD-EPI 2021: 92 ML/MIN/1.73M2 (ref 60–?)
EOSINOPHIL # BLD AUTO: 0 X10(3) UL (ref 0–0.7)
EOSINOPHIL NFR BLD AUTO: 0 %
ERYTHROCYTE [DISTWIDTH] IN BLOOD BY AUTOMATED COUNT: 12.9 %
FIO2: 40 %
GLUCOSE BLD-MCNC: 100 MG/DL (ref 70–99)
GLUCOSE BLD-MCNC: 110 MG/DL (ref 70–99)
GLUCOSE BLD-MCNC: 117 MG/DL (ref 70–99)
GLUCOSE BLD-MCNC: 117 MG/DL (ref 70–99)
GLUCOSE BLD-MCNC: 122 MG/DL (ref 70–99)
GLUCOSE BLD-MCNC: 126 MG/DL (ref 70–99)
GLUCOSE BLD-MCNC: 127 MG/DL (ref 70–99)
GLUCOSE BLD-MCNC: 128 MG/DL (ref 70–99)
GLUCOSE BLD-MCNC: 138 MG/DL (ref 70–99)
GLUCOSE BLD-MCNC: 145 MG/DL (ref 70–99)
GLUCOSE BLD-MCNC: 150 MG/DL (ref 70–99)
GLUCOSE BLD-MCNC: 176 MG/DL (ref 70–99)
HCO3 BLDA-SCNC: 26.2 MEQ/L (ref 21–27)
HCT VFR BLD AUTO: 28.1 %
HGB BLD-MCNC: 10.3 G/DL
HGB BLD-MCNC: 9.9 G/DL
IMM GRANULOCYTES # BLD AUTO: 0.09 X10(3) UL (ref 0–1)
IMM GRANULOCYTES NFR BLD: 0.7 %
LACTATE BLD-SCNC: 1.6 MMOL/L (ref 0.5–2)
LYMPHOCYTES # BLD AUTO: 0.6 X10(3) UL (ref 1–4)
LYMPHOCYTES NFR BLD AUTO: 4.4 %
MAGNESIUM SERPL-MCNC: 2 MG/DL (ref 1.6–2.6)
MCH RBC QN AUTO: 31.1 PG (ref 26–34)
MCHC RBC AUTO-ENTMCNC: 35.2 G/DL (ref 31–37)
MCV RBC AUTO: 88.4 FL
METHGB MFR BLD: 0.2 % SAT (ref 0.4–1.5)
MONOCYTES # BLD AUTO: 0.63 X10(3) UL (ref 0.1–1)
MONOCYTES NFR BLD AUTO: 4.6 %
NEUTROPHILS # BLD AUTO: 12.44 X10 (3) UL (ref 1.5–7.7)
NEUTROPHILS # BLD AUTO: 12.44 X10(3) UL (ref 1.5–7.7)
NEUTROPHILS NFR BLD AUTO: 90.2 %
OSMOLALITY SERPL CALC.SUM OF ELEC: 300 MOSM/KG (ref 275–295)
OXYHGB MFR BLDA: 96.5 % (ref 92–100)
P AXIS: 74 DEGREES
P AXIS: 79 DEGREES
P-R INTERVAL: 144 MS
P-R INTERVAL: 162 MS
PCO2 BLDA: 38 MM HG (ref 35–45)
PEEP: 5 CM H2O
PH BLDA: 7.44 [PH] (ref 7.35–7.45)
PHOSPHATE SERPL-MCNC: 2.7 MG/DL (ref 2.4–5.1)
PLATELET # BLD AUTO: 151 10(3)UL (ref 150–450)
PO2 BLDA: 84 MM HG (ref 80–100)
POTASSIUM BLD-SCNC: 4.6 MMOL/L (ref 3.6–5.1)
POTASSIUM SERPL-SCNC: 4.1 MMOL/L (ref 3.5–5.1)
POTASSIUM SERPL-SCNC: 4.1 MMOL/L (ref 3.5–5.1)
PRESSURE SUPPORT: 5 CM H2O
Q-T INTERVAL: 384 MS
Q-T INTERVAL: 402 MS
QRS DURATION: 86 MS
QRS DURATION: 92 MS
QTC CALCULATION (BEZET): 420 MS
QTC CALCULATION (BEZET): 497 MS
R AXIS: -33 DEGREES
R AXIS: 8 DEGREES
RBC # BLD AUTO: 3.18 X10(6)UL
SODIUM BLD-SCNC: 139 MMOL/L (ref 135–145)
SODIUM SERPL-SCNC: 144 MMOL/L (ref 136–145)
SODIUM SERPL-SCNC: 144 MMOL/L (ref 136–145)
T AXIS: 52 DEGREES
T AXIS: 83 DEGREES
UNIT VOLUME: 307 ML
UNIT VOLUME: 565 ML
VENTRICULAR RATE: 72 BPM
VENTRICULAR RATE: 92 BPM
WBC # BLD AUTO: 13.8 X10(3) UL (ref 4–11)

## 2025-01-17 PROCEDURE — 71045 X-RAY EXAM CHEST 1 VIEW: CPT | Performed by: PHYSICIAN ASSISTANT

## 2025-01-17 PROCEDURE — 71045 X-RAY EXAM CHEST 1 VIEW: CPT | Performed by: THORACIC SURGERY (CARDIOTHORACIC VASCULAR SURGERY)

## 2025-01-17 RX ORDER — HYDROCODONE BITARTRATE AND ACETAMINOPHEN 5; 325 MG/1; MG/1
2 TABLET ORAL EVERY 4 HOURS PRN
Status: DISCONTINUED | OUTPATIENT
Start: 2025-01-17 | End: 2025-01-22

## 2025-01-17 RX ORDER — ROSUVASTATIN CALCIUM 20 MG/1
40 TABLET, COATED ORAL NIGHTLY
Status: DISCONTINUED | OUTPATIENT
Start: 2025-01-17 | End: 2025-01-22

## 2025-01-17 RX ORDER — INSULIN ASPART 100 [IU]/ML
INJECTION, SOLUTION INTRAVENOUS; SUBCUTANEOUS ONCE
Status: DISCONTINUED | OUTPATIENT
Start: 2025-01-17 | End: 2025-01-17

## 2025-01-17 RX ORDER — FUROSEMIDE 10 MG/ML
40 INJECTION INTRAMUSCULAR; INTRAVENOUS ONCE
Status: COMPLETED | OUTPATIENT
Start: 2025-01-17 | End: 2025-01-17

## 2025-01-17 RX ORDER — HYDROCODONE BITARTRATE AND ACETAMINOPHEN 5; 325 MG/1; MG/1
1 TABLET ORAL EVERY 4 HOURS PRN
Status: DISCONTINUED | OUTPATIENT
Start: 2025-01-17 | End: 2025-01-22

## 2025-01-17 RX ADMIN — DEXMEDETOMIDINE HYDROCHLORIDE 0.3 MCG/KG/HR: 4 INJECTION, SOLUTION INTRAVENOUS at 03:25:00

## 2025-01-17 RX ADMIN — DEXMEDETOMIDINE HYDROCHLORIDE 0.1 MCG/KG/HR: 4 INJECTION, SOLUTION INTRAVENOUS at 04:00:00

## 2025-01-17 RX ADMIN — ALBUMIN HUMAN 12.5 G: 50 SOLUTION INTRAVENOUS at 03:21:00

## 2025-01-17 RX ADMIN — SENNOSIDES 8.6 MG: 8.6 MG TABLET ORAL at 08:29:00

## 2025-01-17 RX ADMIN — DOCUSATE SODIUM 100 MG: 100 CAPSULE, LIQUID FILLED ORAL at 08:30:00

## 2025-01-17 RX ADMIN — DEXMEDETOMIDINE HYDROCHLORIDE 0.5 MCG/KG/HR: 4 INJECTION, SOLUTION INTRAVENOUS at 00:14:00

## 2025-01-17 RX ADMIN — SENNOSIDES 8.6 MG: 8.6 MG TABLET ORAL at 21:18:00

## 2025-01-17 RX ADMIN — CETIRIZINE HYDROCHLORIDE 10 MG: 10 TABLET ORAL at 08:30:00

## 2025-01-17 RX ADMIN — ROSUVASTATIN CALCIUM 40 MG: 20 TABLET, COATED ORAL at 21:18:00

## 2025-01-17 RX ADMIN — DOCUSATE SODIUM 100 MG: 100 CAPSULE, LIQUID FILLED ORAL at 21:18:00

## 2025-01-17 RX ADMIN — HYDROCODONE BITARTRATE AND ACETAMINOPHEN 2 TABLET: 5; 325 TABLET ORAL at 21:18:00

## 2025-01-17 RX ADMIN — ASPIRIN 81 MG: 81 TABLET, CHEWABLE ORAL at 08:30:00

## 2025-01-17 RX ADMIN — VANCOMYCIN HYDROCHLORIDE 1500 MG: at 11:56:00

## 2025-01-17 RX ADMIN — FUROSEMIDE 40 MG: 10 INJECTION INTRAMUSCULAR; INTRAVENOUS at 11:56:00

## 2025-01-17 RX ADMIN — HYDROCODONE BITARTRATE AND ACETAMINOPHEN 2 TABLET: 5; 325 TABLET ORAL at 15:45:00

## 2025-01-17 NOTE — OCCUPATIONAL THERAPY NOTE
Received OT evaluation order. Waiting for chest x-ray after chest tube was removed. Will follow up tomorrow.

## 2025-01-17 NOTE — PLAN OF CARE
Assumed care of pt @ 1745.  Rec'd pt in from [facility] s/p CABGX2.  Tele=NSR.  Weaning Dobs to 1 mcg/kg by am.    Insulin drip noted. [place] hospitalist consulted for increased insulin column needs.    Pt. Vented/sedated with propofol/precedex overnoc.  Weaned off sedation, pt. MAEx4 to command, nods head yes/no appropriately.    CTx2 to wall suction.  Extubated @ 0620 to 2LNC    Corbett: 825cc out  CT: 135cc out, 200since OR.  Total hourly IV intake: 43.1cc/hr  +4043CC for past 24hrs.

## 2025-01-17 NOTE — PLAN OF CARE
Assumed care of patient at approximately 0730. Pt A&Ox4. Pt maintaining saturations on  2 liters/min via nasal cannula . On telemetry, NSR. PW disconnected from pacer. Cordis/art line discontinued this am . Augustus ROBLEDO rounded in afternoon, orders to remove chest tubes and anderson.  Pt reports c/o surgical pain, Dilaudid PCA in place. Pt up using rolling walker w/standby assist. Pt and family updated on plan of care and verbalized understanding.         Problem: Patient/Family Goals  Goal: Patient/Family Long Term Goal  Description: Patient's Long Term Goal: Stay Healthy    Interventions:  - Resume home med regimen  -Follow up with providers  - See additional Care Plan goals for specific interventions  Outcome: Progressing  Goal: Patient/Family Short Term Goal  Description: Patient's Short Term Goal: Discharge from hospital    Interventions:   - CABG 1/16  - See additional Care Plan goals for specific interventions  Outcome: Progressing     Problem: CARDIOVASCULAR - ADULT  Goal: Maintains optimal cardiac output and hemodynamic stability  Description: INTERVENTIONS:  - Monitor vital signs, rhythm, and trends  - Monitor for bleeding, hypotension and signs of decreased cardiac output  - Evaluate effectiveness of vasoactive medications to optimize hemodynamic stability  - Monitor arterial and/or venous puncture sites for bleeding and/or hematoma  - Assess quality of pulses, skin color and temperature  - Assess for signs of decreased coronary artery perfusion - ex. Angina  - Evaluate fluid balance, assess for edema, trend weights  Outcome: Progressing  Goal: Absence of cardiac arrhythmias or at baseline  Description: INTERVENTIONS:  - Continuous cardiac monitoring, monitor vital signs, obtain 12 lead EKG if indicated  - Evaluate effectiveness of antiarrhythmic and heart rate control medications as ordered  - Initiate emergency measures for life threatening arrhythmias  - Monitor electrolytes and administer replacement therapy  as ordered  Outcome: Progressing     Problem: RESPIRATORY - ADULT  Goal: Achieves optimal ventilation and oxygenation  Description: INTERVENTIONS:  - Assess for changes in respiratory status  - Assess for changes in mentation and behavior  - Position to facilitate oxygenation and minimize respiratory effort  - Oxygen supplementation based on oxygen saturation or ABGs  - Provide Smoking Cessation handout, if applicable  - Encourage broncho-pulmonary hygiene including cough, deep breathe, Incentive Spirometry  - Assess the need for suctioning and perform as needed  - Assess and instruct to report SOB or any respiratory difficulty  - Respiratory Therapy support as indicated  - Manage/alleviate anxiety  - Monitor for signs/symptoms of CO2 retention  Outcome: Progressing

## 2025-01-17 NOTE — PROGRESS NOTES
[facility]   part of [facility]     CV Surgery Progress Note    Juan Pablo Lam Patient Status:  Inpatient    1960 MRN [id number]   Location [facility] 6NE-A Attending Mathew Grewal MD   Hosp Day # 2 PCP Parker Clancy MD     Subjective:  Patient reports feeling ok. Pain is controlled. Denies any nausea.     Tele: SR    Objective:  /77   Pulse 79   Temp 98.5 °F (36.9 °C) (Temporal)   Resp (!) 27   Ht 5' 10\" (1.778 m)   Wt 227 lb 8.2 oz (103.2 kg)   SpO2 93%   BMI 32.65 kg/m²     Intake/Output:    Intake/Output Summary (Last 24 hours) at 2025 0921  Last data filed at 2025 0900  Gross per 24 hour   Intake 5969.25 ml   Output 2765 ml   Net 3204.25 ml       Labs:  Lab Results   Component Value Date    WBC 13.8 2025    RBC 3.18 2025    HGB 9.9 2025    HCT 28.1 2025    MCV 88.4 2025    MCH 31.1 2025    MCHC 35.2 2025    RDW 12.9 2025    .0 2025     Lab Results   Component Value Date     2025     2025    K 4.1 2025    K 4.1 2025     2025     2025    CO2 24.0 2025    CO2 24.0 2025    BUN 15 2025    BUN 15 2025    CREATSERUM 0.92 2025    CREATSERUM 0.92 2025     2025     2025    CA 8.4 2025    CA 8.4 2025    ALB 3.5 2025     Lab Results   Component Value Date    INR 0.74 (L) 2025    INR 1.49 (H) 2025    INR 1.05 01/15/2025       Studies:  CXR:   Left basilar atelectasis     Physical Exam:  General: VSS, A&Ox3, In NAD  Neck: No JVD. Cordis in RIJ  Lungs: clear anteriorly   Heart: S1,S2 RRR. Soft rub. Sternum Stable   Abdomen: Soft, non-tender  Extremities: Warm, dry, trace generalized edema  Skin: sternotomy incision C/D/I, LE with ACE wrap   Neuro: no focal deficits     Assessment/Plan:   S/P CABGx2 with LIMA-LAD, SVG-OM POD#1  -HD stable, off support, starting to  de-line   -Acute post op blood loss anemia, expected- monitor   -N+3.9L, decreased uop- lasix x1 today   -Renal function intact, monitor   -Bowel regimen   -Pain management, dilaudid pca   -Keep chest tubes for now due to high output   -Corbett for accuraet I/Os  -Keep PW for now   -GI PPX: protonix   -DVT PPX: TEDs/SCDs  -Encourage IS/ambulation   -PT/OT/Cardiac rehab   -CCU monitoring     -D/W Dr. Coffman/Tania Celeste PA-C  Cardiothoracic Surgery   1/17/2025  9:21 AM

## 2025-01-17 NOTE — PAYOR COMM NOTE
ADMISSION REVIEW     Payor: [employer]  Subscriber #:  [id number]  Authorization Number: [id number]    Admit date: 1/15/25  Admit time:  5:19 PM       REVIEW DOCUMENTATION:  ED Provider Notes    No notes of this type exist for this encounter.         1/15 Procedure     Signed            OPERATIVE REPORT - CARDIAC CATHETERIZATION     Date of Procedure: 1/15/2025      Performing Physician: Mathew Grewal MD     Pre-Procedure Diagnosis:   CAD by CAC score > 1000  Abnormal stress test  Chest pain with high level exertion     Post-Procedure Diagnosis:  Critical ostial left main stenosis     Findings:  Left main: Ostial >90% stenosis, likely heavily calcified nodular plaque  LAD: Transapical vessel, mild plaquing mid < 50%, luminal irregularities several smaller diagonals  Left circumflex: Supplies a large principal OM branch, luminal irregularities   RCA: Dominant to PDA. Large PDA/RPL system with several branches, mild plaquing only.   LV: EF 60%, no WMA, no sig MR, LVEDP 13 mmHg. No LV-Ao gradient on pullback.      Conclusions / Recommendations:  Critical ostial left main stenosis with good distal targets for bypass. Discussed with Dr. Coffman who will evaluate for CABG.   Check echo  Remove the TR band per protocol  Post cath IVF  Continue medical therapy     -----------------------     Procedures performed:   1. Left heart catheterization  2. Selective bilateral coronary angiography  3. Moderate sedation  4. Access of right radial artery     Indications: This is a 64 year old male presenting for left heart catheterization with coronary angiography to evaluate for obstructive CAD.      Description of Procedure: Informed consent was obtained from the patient in writing. The risks and benefits of the procedure were reviewed in detail with the patient, including but not limited to the risk of myocardial infarction, stroke, renal failure, bleeding, and death. After a thorough discussion of these risks and benefits,  the patient agreed to proceed and signed an informed consent document. The patient was brought to the cardiac catheterization laboratory in the fasting state. Moderate sedation was employed using a total of IV Versed 2 mg and IV fentanyl 50 mcg in divided doses.  I directly observed the patient from 1403 to 1425, and an independent trained observer was present and assisted in the monitoring of the patient's level of consciousness and physiological status, heart rate, blood pressure, oximetry, and rhythm. All operators present for the case performed standard pre-procedural prep including hand washing, sterile gloves, gown, mask, and cap. All aspects of the maximum sterile barrier technique were followed. A preprocedural time out was performed with all physicians, technologists, and nurses involved with the procedure. 1% lidocaine was infiltrated subcutaneously in the right wrist for local anesthesia. Ultrasound guided access was obtained in the right radial artery with a 5/6F Slender Glidesheath using the Seldinger technique and a micropuncture needle with a single anterior wall puncture. Standard vasodilator cocktail was given with heparin 5000 units, nitroglycerin 200 mcg and verapamil 2.5 mg through the arterial sheath. A 6F TIG catheter was advanced over a J tipped wire through the arterial sheath and placed in the aortic root, then used to perform nonselective angiography of the left main coronary artery. Due to severe ostial disease we tried not to manipulate the vessel itself. Standard angiographic views were taken in orthogonal projections. The RCA was then engaged and standard angiographic views were performed. The catheter was then exchanged for a pigtail and prolapsed into the LV over a wire and placed at the base of the LV. LV systolic and end diastolic pressure was then recorded. LV gram performed. The catheter was pulled back and pullback measurements of LV and aortic pressure and recorded. The catheter  was then removed over a wire. At the conclusion of the procedure, all catheters and wires were removed over a wire. The arterial sheath was removed and hemostasis achieved with standard TR band using patent hemostasis technique. There was no bleeding or hematoma. The patient tolerated the procedure well without complication. EBL <10 mL. Total contrast ~ 100 mL. See procedure log for fluoro time and radiation dose.                    1/15 Consult Cardiology    Reason for consult: CAD     History of Present Illness:  Juan Pablo Lam is a 64 year old male who presented to [facility] on 1/15/2025 for elective cath. Had abnormal nuc stress. Chest pressure at high intensity exertion only, last few months. None at rest. Cath with ostial left main stenosis. .    Physical Exam:  Blood pressure 133/71, pulse 68, temperature 98.1 °F (36.7 °C), temperature source Oral, resp. rate 21, height 70\", weight 225 lb 1.6 oz (102.1 kg), SpO2 96%.  Temp (24hrs), Av.9 °F (36.6 °C), Min:97.5 °F (36.4 °C), Max:98.2 °F (36.8 °C)         Wt Readings from Last 3 Encounters:   25 225 lb 1.6 oz (102.1 kg)   22 222 lb (100.7 kg)         General: Awake and alert; in no acute distress  HEENT: Extraocular movements are intact; sclerae are anicteric; scalp is atraumatic  Neck: Supple; no JVD; no carotid bruits  Cardiac: Regular rate and regular rhythm; normal S1 and S2, no murmurs, rubs, or gallops are appreciated  Lungs: Clear to auscultation bilaterally; no accessory muscle use is noted, no wheezes, rhonci or rales  Abdomen: Soft, non-distended, non-tender; bowel sounds are normoactive  Extremities: Warm, no edema, clubbing or cyanosis; moves all 4 extremities normally, distal pulses intact and equal  Psychiatric: Normal mood and affect; answers questions appropriately  Dermatologic: No rashes; normal skin turgor       Impression:  64 year old male with stable angina but severe ostial left main stenosis.       Recommendations:  Admit for CABG.  Check echo  ASA, statin, BP control         1/15 consult  IM       History of Present Illness: Patient is a 64 year old male with a history of hyperlipidemia and CAD who underwent cardiac cath on 1/15 with Dr. Grewal showing critical ostial left main stenosis with good distal targets for bypass.  Patient will be undergoing CABG on 1/16.        PMH:  Past Medical History       Past Medical History:    Coronary atherosclerosis    High cholesterol            PSH:  Past Surgical History         Past Surgical History:   Procedure Laterality Date    [place] teeth removed         1982           OBJECTIVE:  /70 (BP Location: Left arm)   Pulse 77   Temp 97.5 °F (36.4 °C) (Oral)   Resp 20   Ht 5' 10\" (1.778 m)   Wt 225 lb (102.1 kg)   SpO2 97%   BMI 32.28 kg/m²      Exam   Gen: Alert, no acute distress  Heent: Normocephalic, atraumatic, neck supple, EOMI, PERRLA  Pulm: Lungs CTAB, normal respiratory effort  CV:  Regular rate and rhythm, no murmurs/rubs/gallops  Abd: Soft, nontender, nondistended, bowel sounds present  Extremities: No peripheral edema, no clubbing, pulses intact   Skin: No rashes or lesions  Neuro: AOx3, no focal neurologic deficits, CN II-XII grossly intact  Psych: appropriate mood and affect           1/16 IM     Subjective:      CABG today.     OBJECTIVE:     Blood pressure 133/71, pulse 68, temperature 98.1 °F (36.7 °C), temperature source Oral, resp. rate 21, height 5' 10\" (1.778 m), weight 225 lb 1.6 oz (102.1 kg), SpO2 96%.     Temp:  [97.5 °F (36.4 °C)-98.2 °F (36.8 °C)] 98.1 °F (36.7 °C)  Pulse:  [60-93] 68  Resp:  [12-22] 21  BP: (133-156)/(64-79) 133/71  SpO2:  [95 %-99 %] 96 %        Assessment/Plan:      64 year old male with a history of hyperlipidemia and CAD who underwent cardiac cath on 1/15 with Dr. Grewal showing critical ostial left main stenosis with good distal targets for bypass.  Patient will be undergoing CABG on 1/16.     CAD, critical  ostial left main stenosis  -N.p.o. CABG today  -Echocardiogram     Hyperlipidemia  -Statin     Dispo: CABG today.       1/16 Operative report    ADMISSION DATE:       01/15/2025      OPERATION DATE:  01/16/2025     OPERATIVE REPORT     PREOPERATIVE DIAGNOSIS:  Coronary artery disease, left main coronary stenosis.  POSTOPERATIVE DIAGNOSIS:  Coronary artery disease, left main coronary stenosis.  PROCEDURE:  Coronary revascularization x2: Left internal mammary artery graft to the left anterior descending and saphenous vein graft to the obtuse marginal.       ASSISTANT SURGEON:  Hammad Marin MD     ASSISTANT:  Parker Guerra PA-C     ANESTHESIA:  General endotracheal.     BLOOD LOSS:  600 mL.     COMPLICATIONS:  None.     TRANSFUSIONS:  None.     INDICATIONS:  This patient is a very pleasant 64-year-old gentleman who has been pretty much asymptomatic.  He recently has gone through a variety of screening cardiac tests, the sum of which has culminated in catheterization yesterday demonstrating critical ostial left main coronary stenosis of literally 99%.  Right coronary is not obstructed.  Risks and benefits of surgical correction were discussed in detail.      OPERATIVE TECHNIQUE:  Appropriate lines and catheters were placed.  General anesthesia was induced.  Corbett catheter was placed in bladder for drainage.  McLean-Katie was placed.  Transesophageal echo was placed.  Transcranial oximetry was placed.  Patient was prepped and draped.  Sternotomy was performed.  Left internal mammary artery was taken down.  Greater saphenous graft was harvested endoscopically from the left leg.  The patient was heparinized and cannulated for cardiopulmonary bypass.  On bypass, patient was cooled to 32 degrees centigrade.  The aorta was crossclamped.  Blood cardioplegia was infused to achieve electromechanical arrest of the heart.  Obtuse marginal was bypassed first.  This was a 1.5 mm to 2 mm artery of good quality.  Cardioplegia  was given, following which the left internal mammary artery was placed to the LAD roughly in the midportion of LAD.  The LAD had moderate diffuse plaquing present.  Internal mammary was a good quality 1.5 mm to 2 mm artery.  Rewarming was begun while proximal anastomosis for the vein graft was constructed end-to-side to the aorta.  Warm cardioplegia was given.  The aorta was unclamped.  Heart was defibrillated once, ultimately to sinus rhythm.  Pacing leads were applied to the atrium and ventricle.  Following complete and thorough rewarming, the patient was weaned from cardiopulmonary bypass without difficulty.  Pump time 60 minutes.  Crossclamp 43 minutes.  Cardioplegia 1.8 L.  Patient was decannulated.  Protamine was administered.  Pacing leads were applied to the atrium and ventricle.  Chest was closed with double-stranded wire.  Subcutaneous tissue and skin were closed.  The patient was taken to the ICU in stable condition.       The patient's wife was updated by me in person regarding the patient's condition and the conduct of the operation.     Dictated By Cash Coffman MD      1/16 Cardiology       Reason for consult: CAD     Events: Stable. Denies CP or SOB.      History of Present Illness:  Juan Pablo Lam is a 64 year old male who presented to [facility] on 1/15/2025 for elective cath. Had abnormal nuc stress. Chest pressure at high intensity exertion only, last few months. None at rest. Cath with ostial left main stenosis. .     Telemetry: Predominantly sinus rhythm, no malignant arrhythmias      Impression:  64 year old male with stable angina but severe ostial left main stenosis.      Recommendations:  CABG today.  Echo reviewed, normal LV and valve function  ASA, statin, BP control           MEDICATIONS ADMINISTERED IN LAST 1 DAY:  Transfuse platelets       Date Action Dose Route User    1/16/2025 1617 New Bag (none) (none) Elver Noel MD          Transfuse fresh frozen plasma       Date  Action Dose Route User    1/16/2025 1633 New Bag (none) (none) Elver Noel MD          Transfuse fresh frozen plasma       Date Action Dose Route User    1/16/2025 1648 New Bag (none) (none) Elver Noel MD          albumin human (Albumin) 5% injection 12.5 g       Date Action Dose Route User    1/17/2025 0321 New Bag 12.5 g Intravenous Yusef Hodgson RN          aspirin chewable tab 81 mg       Date Action Dose Route User    1/17/2025 0830 Given 81 mg Oral Raya Mcginnis RN          ceFAZolin (Ancef) 2g in 10mL IV syringe premix       Date Action Dose Route User    1/16/2025 1616 Given 2 g Intravenous Elver Noel MD          ceFAZolin (Ancef) 2g in 10mL IV syringe premix       Date Action Dose Route User    1/17/2025 0829 New Bag 2 g Intravenous Raya Mcginnis RN    1/16/2025 2303 New Bag 2 g Intravenous Yusef Hodgson RN          cetirizine (ZyrTEC) tab 10 mg       Date Action Dose Route User    1/17/2025 0830 Given 10 mg Oral Raya Mcginnis RN          chlorhexidine gluconate (Peridex) 0.12 % oral solution 15 mL       Date Action Dose Route User    1/16/2025 1917 Given 15 mL Mouth/Throat Yusef Hodgson RN          coagulation factor VIIa recomb (NovoSeven) injection 5 mg       Date Action Dose Route User    1/16/2025 1708 Given 5 mg Intravenous Elver Noel MD          dexmedeTOMIDine in sodium chloride 0.9% (Precedex) 400 mcg/100mL infusion premix       Date Action Dose Route User    1/17/2025 0400 Rate/Dose Change 0.1 mcg/kg/hr × 102.1 kg (Dosing Weight) Intravenous Yusef Hodgson RN    1/17/2025 0325 Rate/Dose Change 0.3 mcg/kg/hr × 102.1 kg (Dosing Weight) Intravenous Yusef Hodgson RN    1/17/2025 0014 New Bag 0.5 mcg/kg/hr × 102.1 kg (Dosing Weight) Intravenous Yusef Hodgson RN    1/16/2025 2303 Rate/Dose Verify 0.5 mcg/kg/hr × 102.1 kg (Dosing Weight) Intravenous Yusef Hodgson RN    1/16/2025 2200 Rate/Dose Verify 0.5 mcg/kg/hr × 102.1  kg (Dosing Weight) Intravenous Yusef Hodgson RN    1/16/2025 2100 Rate/Dose Verify 0.5 mcg/kg/hr × 102.1 kg (Dosing Weight) Intravenous Yusef Hodgson RN    1/16/2025 2002 New Bag 0.5 mcg/kg/hr × 102.1 kg (Dosing Weight) Intravenous Yusef Hodgson RN          dextrose 5%-sodium chloride 0.45% infusion       Date Action Dose Route User    1/16/2025 1736 New Bag 80 mL/hr Intravenous Rebeka Ott RN          DOBUTamine in dextrose 5% (Dobutrex) 500 mg/250mL infusion premix       Date Action Dose Route User    1/17/2025 0500 Rate/Dose Verify 1 mcg/kg/min × 102.1 kg (Dosing Weight) Intravenous Yusef Hodgson RN    1/16/2025 2303 Rate/Dose Verify 1 mcg/kg/min × 102.1 kg (Dosing Weight) Intravenous Yusef Hodgson RN    1/16/2025 2003 Rate/Dose Verify 1 mcg/kg/min × 102.1 kg (Dosing Weight) Intravenous Yusef Hodgson RN    1/16/2025 1950 Rate/Dose Change 1 mcg/kg/min × 102.1 kg (Dosing Weight) Intravenous Yusef Hodgson RN    1/16/2025 1928 Rate/Dose Change 2 mcg/kg/min × 102.1 kg (Dosing Weight) Intravenous Yusef Hodgson RN    1/16/2025 1730 New Bag 3 mcg/kg/min × 102.1 kg (Dosing Weight) Intravenous Yusef Hodgson RN          DOBUTamine in dextrose 5% (Dobutrex) 500 mg/250mL infusion premix       Date Action Dose Route User    1/16/2025 1511 Rate/Dose Change 1 mcg/kg/min × 102.1 kg Intravenous Elver Noel MD    1/16/2025 1500 Rate/Dose Change 3 mcg/kg/min × 102.1 kg Intravenous Elver Noel MD    1/16/2025 1443 New Bag 5 mcg/kg/min × 102.1 kg Intravenous Elver Noel MD          docusate sodium (Colace) cap 100 mg       Date Action Dose Route User    1/17/2025 0830 Given 100 mg Oral Raya Mcginnis RN          EPINEPHrine (Adrenalin) 5 mg in sodium chloride 0.9% 250 mL infusion       Date Action Dose Route User    1/16/2025 1452 Rate/Dose Change 3 mcg/min Intravenous Elver Noel MD    1/16/2025 1447 Rate/Dose Change 5 mcg/min Intravenous  Elver Noel MD    1/16/2025 1444 New Bag 3 mcg/min Intravenous Elver Noel MD          fentaNYL (Sublimaze) 50 mcg/mL injection       Date Action Dose Route User    1/16/2025 1622 Given 100 mcg Intravenous Elver Noel MD          furosemide (Lasix) 10 mg/mL injection 40 mg       Date Action Dose Route User    1/17/2025 1156 Given 40 mg Intravenous Raya Mcginnis RN          gelatin adsorbable (Gelfoam) 100 external spone       Date Action Dose Route User    1/16/2025 1321 Given 1 each Topical (Chest) Cash Coffman MD          heparin in lactated ringers ((Porcine)) 6500 UNITS - 1 L for CVOR procedural use       Date Action Dose Route User    1/16/2025 1321 Given 6,500 Units Irrigation (Chest) Hammad Marin MD          heparin (Porcine) 1000 UNIT/ML injection       Date Action Dose Route User    1/16/2025 1342 Given 26,000 Units Intravenous Elver Noel MD    1/16/2025 1329 Given 5,000 Units Intravenous Elver Noel MD          HYDROmorphone in sodium chloride 0.9% (Dilaudid) 20 mg/100mL PCA premix       Date Action Dose Route User    1/17/2025 1050 Hi-Risk Other 20 mg Intravenous Raya Mcginnis RN    1/16/2025 1940 New Bag 20 mg Intravenous Yusef Hodgson RN          insulin aspart (NovoLOG) 100 Units/mL FlexPen 1-5 Units       Date Action Dose Route User    1/17/2025 1247 Given 1 Units Subcutaneous (Right Upper Arm) Viviana Lou RN          insulin regular human (Novolin R, Humulin R) 100 Units in sodium chloride 0.9% 100 mL standard infusion (100 mL)       Date Action Dose Route User    1/16/2025 1354 New Bag 3 Units/hr Intravenous Elver Noel MD          insulin regular human (Novolin R, Humulin R) 100 Units in sodium chloride 0.9% 100 mL standard infusion (100 mL)       Date Action Dose Route User    1/17/2025 0400 Rate/Dose Verify 6 Units/hr Intravenous Yusef Hodgson RN    1/17/2025 0200 Rate/Dose Verify 6 Units/hr  Intravenous Hodgson, Yusef C, RN    1/17/2025 0100 Rate/Dose Change 6 Units/hr Intravenous Hodgson, Yusef C, RN    1/17/2025 0000 Rate/Dose Change 8 Units/hr Intravenous Hodgson, Yusef C, RN    1/16/2025 2257 Rate/Dose Change 4 Units/hr Intravenous Hodgson, Yusef C, RN    1/16/2025 2158 Rate/Dose Change 6 Units/hr Intravenous Hodgson, Yusef C, RN    1/16/2025 1954 Rate/Dose Change 4 Units/hr Intravenous Hodgson, Yusef C, RN    1/16/2025 1900 Rate/Dose Change 3 Units/hr Intravenous Hodgson, Yusef C, RN    1/16/2025 1803 New Bag 1 Units/hr Intravenous Rebeka Ott RN          metoclopramide (Reglan) 5 mg/mL injection       Date Action Dose Route User    1/16/2025 1454 Given 10 mg Intravenous Elver Noel MD          midazolam (Versed) 2 MG/2ML injection       Date Action Dose Route User    1/16/2025 1346 Given 4 mg Intravenous Elver Noel MD          mupirocin (Bactroban) 2% nasal ointment 1 Application       Date Action Dose Route User    1/17/2025 0829 Given 1 Application Nasal (Bilateral Nares) Raya Mcginnis RN    1/16/2025 2005 Given 1 Application Nasal (Bilateral Nares) Yusef Hodgson RN          nitroGLYCERIN in dextrose 5% 50 mg/250mL infusion premix       Date Action Dose Route User    1/16/2025 1533 Rate/Dose Change 10 mcg/min Intravenous Elver Noel MD    1/16/2025 1532 Restarted 5 mcg/min Intravenous Elver Noel MD    1/16/2025 1510 Rate/Dose Change 10 mcg/min Intravenous Elver Noel MD    1/16/2025 1500 Rate/Dose Change 15 mcg/min Intravenous Elver Noel MD    1/16/2025 1458 New Bag 10 mcg/min Intravenous Elver Noel MD          ceFAZolin Sodium 2 g in sodium chloride 0.9% 2,000 mL irrigation       Date Action Dose Route User    1/16/2025 1321 Given (none) Irrigation (Chest) Cash Coffman MD          pantoprazole (Protonix) 40 mg in sodium chloride 0.9% PF 10 mL IV push       Date Action Dose Route User     1/17/2025 0631 Given 40 mg Intravenous Yusef Hodgson RN          phenylephrine (Rojelio-Synephrine) 10 MG/ML injection       Date Action Dose Route User    1/16/2025 1348 Given 200 mcg Intravenous Elver Noel MD    1/16/2025 1346 Given 200 mcg Intravenous Elver Noel MD    1/16/2025 1319 Given 100 mcg Intravenous Elver Noel MD          potassium chloride 20 mEq/100mL IVPB premix 20 mEq       Date Action Dose Route User    1/16/2025 1926 New Bag 20 mEq Intravenous Yusef Hodgson RN          propofol (Diprivan) 10 mg/mL infusion premix       Date Action Dose Route User    1/17/2025 0315 Rate/Dose Change 10 mcg/kg/min × 102.1 kg (Dosing Weight) Intravenous Yusef Hodgson RN    1/17/2025 0300 Rate/Dose Change 15 mcg/kg/min × 102.1 kg (Dosing Weight) Intravenous Yusef Hodgson RN    1/17/2025 0200 Rate/Dose Change 20 mcg/kg/min × 102.1 kg (Dosing Weight) Intravenous Yusef Hodgson RN    1/16/2025 2303 Rate/Dose Verify 25 mcg/kg/min × 102.1 kg (Dosing Weight) Intravenous Yusef Hodgson, GOVIND    1/16/2025 2251 New Bag 25 mcg/kg/min × 102.1 kg (Dosing Weight) Intravenous Yusef Hodgson RN    1/16/2025 2200 Rate/Dose Verify 25 mcg/kg/min × 102.1 kg (Dosing Weight) Intravenous Yusef Hodgson RN    1/16/2025 2003 New Bag 25 mcg/kg/min × 102.1 kg (Dosing Weight) Intravenous Yusef Hodgson RN          propofol (Diprivan) 10 mg/mL infusion premix       Date Action Dose Route User    1/16/2025 1301 New Bag 25 mcg/kg/min × 102.1 kg Intravenous Elver Noel MD          protamine 10 mg/mL injection       Date Action Dose Route User    1/16/2025 1503 Given 50 mg Intravenous Elver Noel MD    1/16/2025 1500 Given 50 mg Intravenous Salvacion, Elver Guzman MD    1/16/2025 1457 Given 50 mg Intravenous Salvacion, Elver Guzman MD    1/16/2025 1456 Given 50 mg Intravenous Salvacion, Elver Guzman MD    1/16/2025 1455 Given 50 mg Intravenous SalvacionElver  MD Megan    1/16/2025 1454 Given 50 mg Intravenous Elver Noel MD    1/16/2025 1453 Given 50 mg Intravenous Elver Noel MD    1/16/2025 1452 Given 50 mg Intravenous Elver Noel MD          rocuronium (Zemuron) 50 mg/5mL injection       Date Action Dose Route User    1/16/2025 1619 Given 50 mg Intravenous Elver Noel MD    1/16/2025 1533 Given 30 mg Intravenous Elver Noel MD    1/16/2025 1346 Given 100 mg Intravenous Elver Noel MD          sennosides (Senokot) tab 8.6 mg       Date Action Dose Route User    1/17/2025 0829 Given 8.6 mg Oral Raya Mcginnis RN          sodium bicarbonate injection       Date Action Dose Route User    1/16/2025 1516 Given 25 mL Intravenous Elver Noel MD    1/16/2025 1512 Given 25 mL Intravenous Elver Noel MD          sodium chloride 0.9% infusion       Date Action Dose Route User    1/16/2025 1735 New Bag (none) Intravenous Rebeka Ott RN          sodium chloride 0.9% infusion       Date Action Dose Route User    1/16/2025 1735 New Bag (none) Intravenous Rebeka Ott RN          vancomycin (Vancocin) 1.5 g in sodium chloride 0.9% 250mL IVPB premix       Date Action Dose Route User    1/17/2025 1156 New Bag 1,500 mg Intravenous Raya Mcginnis RN    1/16/2025 2344 New Bag 1,500 mg Intravenous Yusef Hodgson RN          verapamil in plasmalyte CVOR procedural use irrigation       Date Action Dose Route User    1/16/2025 1322 Given 10 mg Topical (Chest) Cash Coffman MD            Vitals (last day)       Date/Time Temp Pulse Resp BP SpO2 Weight O2 Device O2 Flow Rate (L/min) [facility]    01/17/25 1030 -- 81 35 110/60 100 % -- -- -- CE    01/17/25 1000 -- 81 25 128/116 99 % -- -- -- CE    01/17/25 0900 -- 79 27 102/77 93 % -- -- -- CE    01/17/25 0800 98.5 °F (36.9 °C) 66 24 96/55 97 % -- Nasal cannula 2 L/min CE    01/17/25 0700 -- 67 21 127/59 95 % -- -- -- VF    01/17/25  0627 -- -- -- -- -- -- Nasal cannula 2 L/min CW    01/17/25 0600 -- 67 16 123/59 94 % -- -- -- VF    01/17/25 0500 -- 71 18 120/59 96 % 227 lb 8.2 oz (103.2 kg) -- -- VF    01/17/25 0400 98.3 °F (36.8 °C) 66 17 113/59 95 % -- -- -- VF    01/17/25 0300 -- 69 16 102/57 94 % -- -- -- VF    01/17/25 0200 -- 71 17 100/54 94 % -- -- -- VF    01/17/25 0100 -- 73 17 102/54 94 % -- -- -- VF    01/17/25 0000 98.5 °F (36.9 °C) 74 17 98/53 94 % -- -- -- VF    01/16/25 2300 -- 78 17 104/47 94 % -- -- -- VF    01/16/25 2200 98.3 °F (36.8 °C) 80 19 -- 95 % -- -- -- VF    01/16/25 2100 98.1 °F (36.7 °C) 78 18 -- 95 % -- -- -- VF    01/16/25 2000 -- 81 16 -- 95 % -- -- -- VF    01/16/25 1945 97.8 °F (36.6 °C) 86 16 -- 95 % -- -- -- VF    01/16/25 1930 -- 87 16 -- 96 % -- -- -- VF    01/16/25 1915 -- 90 16 -- 97 % -- -- -- VF    01/16/25 1900 97.6 °F (36.4 °C) 90 19 -- 97 % -- -- -- VF    01/16/25 1845 -- 94 17 -- 97 % -- -- -- VF    01/16/25 1830 97.6 °F (36.4 °C) 93 16 -- 97 % -- Ventilator -- VF    01/16/25 1800 97.5 °F (36.4 °C) -- -- -- -- -- -- -- VF    01/16/25 1800 -- 91 12 -- 98 % -- -- -- AC    01/16/25 1745 -- 91 12 -- 97 % -- -- -- AC    01/16/25 1730 -- 96 12 -- 99 % -- -- -- AC    01/16/25 0905 98.1 °F (36.7 °C) 68 21 133/71 96 % -- None (Room air) -- LS    01/16/25 0530 -- 69 13 -- -- 225 lb 1.6 oz (102.1 kg) -- -- LD    01/16/25 0430 97.7 °F (36.5 °C) 60 19 140/67 97 % -- None (Room air) 0 L/min LD       01/15/25 2033 97.5 °F (36.4 °C) 77 20 151/70 97 % -- None (Room air) 0 L/min LD   01/15/25 1743 -- -- -- -- -- 225 lb (102.1 kg) -- -- MS   01/15/25 1729 98.2 °F (36.8 °C) 77 20 135/67 96 % -- None (Room air) 0 L/min JA   01/15/25 1600 -- 92 21 152/67 96 % -- -- -- DF   01/15/25 1545 -- 88 12 155/67 99 % -- -- -- DF              Blood Transfusion Record       Product Unit Status Volume Start End            Transfuse fresh frozen plasma       24  [id number]  [id number] Completed 01/16/25 1735 305 mL 01/16/25 1648  01/16/25 1658       24  [id number]  [id number] Completed 01/16/25 1735 301 mL 01/16/25 1633 01/16/25 1643                Transfuse platelets       24  [id number]  [id number] Completed 01/16/25 1735 565 mL 01/16/25 1617 01/16/25 1627

## 2025-01-17 NOTE — DIETARY NOTE
Clinical Nutrition     Dietitian consult received per cardiac rehab standing order. Pt to be educated by cardiac rehab staff and encouraged to attend outpatient classes taught by DOMINIK. DOMINIK available PRN.    Sachi Ambrose, DOMINIK, LDN, [facility]  Clinical Dietitian  Spectra: [phone]

## 2025-01-17 NOTE — OPERATIVE REPORT
[facility]    PATIENT'S NAME: KAIN ARCHER   ATTENDING PHYSICIAN: Mathew Grewal MD   OPERATING PHYSICIAN: Cash Coffman MD   PATIENT ACCOUNT#:   [id number]    LOCATION:  [address] [place] [address]  MEDICAL RECORD #:   [id number]       YOB: 1960  ADMISSION DATE:       01/15/2025      OPERATION DATE:  01/16/2025    OPERATIVE REPORT    PREOPERATIVE DIAGNOSIS:  Coronary artery disease, left main coronary stenosis.  POSTOPERATIVE DIAGNOSIS:  Coronary artery disease, left main coronary stenosis.  PROCEDURE:  Coronary revascularization x2: Left internal mammary artery graft to the left anterior descending and saphenous vein graft to the obtuse marginal.      ASSISTANT SURGEON:  Hammad Marin MD    ASSISTANT:  Parker Guerra PA-C    ANESTHESIA:  General endotracheal.    BLOOD LOSS:  600 mL.    COMPLICATIONS:  None.    TRANSFUSIONS:  None.    INDICATIONS:  This patient is a very pleasant 64-year-old gentleman who has been pretty much asymptomatic.  He recently has gone through a variety of screening cardiac tests, the sum of which has culminated in catheterization yesterday demonstrating critical ostial left main coronary stenosis of literally 99%.  Right coronary is not obstructed.  Risks and benefits of surgical correction were discussed in detail.     OPERATIVE TECHNIQUE:  Appropriate lines and catheters were placed.  General anesthesia was induced.  Corbett catheter was placed in bladder for drainage.  [place]-Katie was placed.  Transesophageal echo was placed.  Transcranial oximetry was placed.  Patient was prepped and draped.  Sternotomy was performed.  Left internal mammary artery was taken down.  Greater saphenous graft was harvested endoscopically from the left leg.  The patient was heparinized and cannulated for cardiopulmonary bypass.  On bypass, patient was cooled to 32 degrees centigrade.  The aorta was crossclamped.  Blood cardioplegia was infused to achieve electromechanical arrest of the heart.   Obtuse marginal was bypassed first.  This was a 1.5 mm to 2 mm artery of good quality.  Cardioplegia was given, following which the left internal mammary artery was placed to the LAD roughly in the midportion of LAD.  The LAD had moderate diffuse plaquing present.  Internal mammary was a good quality 1.5 mm to 2 mm artery.  Rewarming was begun while proximal anastomosis for the vein graft was constructed end-to-side to the aorta.  Warm cardioplegia was given.  The aorta was unclamped.  Heart was defibrillated once, ultimately to sinus rhythm.  Pacing leads were applied to the atrium and ventricle.  Following complete and thorough rewarming, the patient was weaned from cardiopulmonary bypass without difficulty.  Pump time 60 minutes.  Crossclamp 43 minutes.  Cardioplegia 1.8 L.  Patient was decannulated.  Protamine was administered.  Pacing leads were applied to the atrium and ventricle.  Chest was closed with double-stranded wire.  Subcutaneous tissue and skin were closed.  The patient was taken to the ICU in stable condition.      The patient's wife was updated by me in person regarding the patient's condition and the conduct of the operation.    Dictated By Cash Coffman MD  d: 01/16/2025 15:55:17  t: 01/17/2025 05:07:10  [facility] [id number]  BF/

## 2025-01-17 NOTE — PHYSICAL THERAPY NOTE
PT attempted this PM - waiting for chest XR (2 hrs) - will hold and re-attempt tomorrow as appropriate.

## 2025-01-17 NOTE — CM/SW NOTE
[facility] reserved in [facility]    [facility] @ discharge  [phone]

## 2025-01-18 LAB
ALBUMIN SERPL-MCNC: 4 G/DL (ref 3.2–4.8)
ANION GAP SERPL CALC-SCNC: 8 MMOL/L (ref 0–18)
ANION GAP SERPL CALC-SCNC: 8 MMOL/L (ref 0–18)
BLOOD TYPE BARCODE: 9500
BUN BLD-MCNC: 26 MG/DL (ref 9–23)
BUN BLD-MCNC: 26 MG/DL (ref 9–23)
CALCIUM BLD-MCNC: 8.7 MG/DL (ref 8.7–10.6)
CALCIUM BLD-MCNC: 8.7 MG/DL (ref 8.7–10.6)
CHLORIDE SERPL-SCNC: 105 MMOL/L (ref 98–112)
CHLORIDE SERPL-SCNC: 105 MMOL/L (ref 98–112)
CO2 SERPL-SCNC: 27 MMOL/L (ref 21–32)
CO2 SERPL-SCNC: 27 MMOL/L (ref 21–32)
CREAT BLD-MCNC: 1.16 MG/DL
CREAT BLD-MCNC: 1.16 MG/DL
EGFRCR SERPLBLD CKD-EPI 2021: 69 ML/MIN/1.73M2 (ref 60–?)
EGFRCR SERPLBLD CKD-EPI 2021: 69 ML/MIN/1.73M2 (ref 60–?)
ERYTHROCYTE [DISTWIDTH] IN BLOOD BY AUTOMATED COUNT: 13.9 %
GLUCOSE BLD-MCNC: 142 MG/DL (ref 70–99)
GLUCOSE BLD-MCNC: 145 MG/DL (ref 70–99)
GLUCOSE BLD-MCNC: 152 MG/DL (ref 70–99)
GLUCOSE BLD-MCNC: 153 MG/DL (ref 70–99)
GLUCOSE BLD-MCNC: 153 MG/DL (ref 70–99)
GLUCOSE BLD-MCNC: 158 MG/DL (ref 70–99)
HCT VFR BLD AUTO: 26.7 %
HGB BLD-MCNC: 9.4 G/DL
MAGNESIUM SERPL-MCNC: 2.1 MG/DL (ref 1.6–2.6)
MCH RBC QN AUTO: 32.1 PG (ref 26–34)
MCHC RBC AUTO-ENTMCNC: 35.2 G/DL (ref 31–37)
MCV RBC AUTO: 91.1 FL
OSMOLALITY SERPL CALC.SUM OF ELEC: 298 MOSM/KG (ref 275–295)
OSMOLALITY SERPL CALC.SUM OF ELEC: 298 MOSM/KG (ref 275–295)
PHOSPHATE SERPL-MCNC: 3.3 MG/DL (ref 2.4–5.1)
PLATELET # BLD AUTO: 174 10(3)UL (ref 150–450)
POTASSIUM SERPL-SCNC: 4.2 MMOL/L (ref 3.5–5.1)
POTASSIUM SERPL-SCNC: 4.2 MMOL/L (ref 3.5–5.1)
RBC # BLD AUTO: 2.93 X10(6)UL
SODIUM SERPL-SCNC: 140 MMOL/L (ref 136–145)
SODIUM SERPL-SCNC: 140 MMOL/L (ref 136–145)
UNIT VOLUME: 350 ML
WBC # BLD AUTO: 20.6 X10(3) UL (ref 4–11)

## 2025-01-18 RX ORDER — FUROSEMIDE 10 MG/ML
20 INJECTION INTRAMUSCULAR; INTRAVENOUS ONCE
Status: COMPLETED | OUTPATIENT
Start: 2025-01-18 | End: 2025-01-18

## 2025-01-18 RX ORDER — FUROSEMIDE 40 MG/1
40 TABLET ORAL DAILY
Status: DISCONTINUED | OUTPATIENT
Start: 2025-01-18 | End: 2025-01-18

## 2025-01-18 RX ORDER — FUROSEMIDE 10 MG/ML
40 INJECTION INTRAMUSCULAR; INTRAVENOUS
Status: DISCONTINUED | OUTPATIENT
Start: 2025-01-18 | End: 2025-01-20

## 2025-01-18 RX ORDER — GUAIFENESIN 600 MG/1
1200 TABLET, EXTENDED RELEASE ORAL 2 TIMES DAILY
Status: DISCONTINUED | OUTPATIENT
Start: 2025-01-18 | End: 2025-01-22

## 2025-01-18 RX ORDER — ACETAMINOPHEN 500 MG
1000 TABLET ORAL EVERY 6 HOURS PRN
Status: DISCONTINUED | OUTPATIENT
Start: 2025-01-18 | End: 2025-01-22

## 2025-01-18 RX ADMIN — ROSUVASTATIN CALCIUM 40 MG: 20 TABLET, COATED ORAL at 20:33:00

## 2025-01-18 RX ADMIN — DOCUSATE SODIUM 100 MG: 100 CAPSULE, LIQUID FILLED ORAL at 20:32:00

## 2025-01-18 RX ADMIN — ASPIRIN 81 MG: 81 TABLET, CHEWABLE ORAL at 09:15:00

## 2025-01-18 RX ADMIN — GUAIFENESIN 1200 MG: 600 TABLET, EXTENDED RELEASE ORAL at 20:32:00

## 2025-01-18 RX ADMIN — CETIRIZINE HYDROCHLORIDE 10 MG: 10 TABLET ORAL at 09:15:00

## 2025-01-18 RX ADMIN — ACETAMINOPHEN 1000 MG: 500 MG TABLET ORAL at 16:22:00

## 2025-01-18 RX ADMIN — FUROSEMIDE 40 MG: 40 TABLET ORAL at 07:36:00

## 2025-01-18 RX ADMIN — SENNOSIDES 8.6 MG: 8.6 MG TABLET ORAL at 20:32:00

## 2025-01-18 RX ADMIN — GUAIFENESIN 1200 MG: 600 TABLET, EXTENDED RELEASE ORAL at 12:02:00

## 2025-01-18 RX ADMIN — FUROSEMIDE 40 MG: 10 INJECTION INTRAMUSCULAR; INTRAVENOUS at 16:13:00

## 2025-01-18 RX ADMIN — DOCUSATE SODIUM 100 MG: 100 CAPSULE, LIQUID FILLED ORAL at 09:15:00

## 2025-01-18 RX ADMIN — PANTOPRAZOLE SODIUM 40 MG: 40 TABLET, DELAYED RELEASE ORAL at 05:35:00

## 2025-01-18 RX ADMIN — HYDROCODONE BITARTRATE AND ACETAMINOPHEN 2 TABLET: 5; 325 TABLET ORAL at 03:06:00

## 2025-01-18 RX ADMIN — SENNOSIDES 8.6 MG: 8.6 MG TABLET ORAL at 09:15:00

## 2025-01-18 RX ADMIN — FUROSEMIDE 20 MG: 10 INJECTION INTRAMUSCULAR; INTRAVENOUS at 12:13:00

## 2025-01-18 NOTE — PLAN OF CARE
Assumed patient care this morning.   Alert, awake. Breathing unlabored at rest, shallow breaths, IS up to 250cc this am, encouraged to continue to work hard on IS, now up to 750cc. Congested cough, started on mucinex. Pain controlled. Tolerating oral intake, but poor appetite. Up in chair/ambulating. Left hand weak, difficulty moving at wrist due to weakness, denies numbness/tingling, sensation intact. Reviewed with Cheryl RN with Dr. Marin, with have Dr. Marin assess this. Cleared to transfer out of ICU, awaiting bed.

## 2025-01-18 NOTE — PROGRESS NOTES
[facility] Hospitalist Progress Note     CC: Hospital Follow up    PCP: Parker Clancy MD       Subjective:     S/p CABG yesterday. Patient doing well, chest tubes removed.     OBJECTIVE:    Blood pressure 146/59, pulse 115, temperature 98.8 °F (37.1 °C), temperature source Temporal, resp. rate (!) 29, height 5' 10\" (1.778 m), weight 227 lb 8.2 oz (103.2 kg), SpO2 94%.    Temp:  [98.3 °F (36.8 °C)-98.9 °F (37.2 °C)] 98.8 °F (37.1 °C)  Pulse:  [] 115  Resp:  [16-36] 29  BP: ()/() 146/59  SpO2:  [92 %-100 %] 94 %  AO: ()/(46-61) 127/61  FiO2 (%):  [40 %] 40 %      Intake/Output:    Intake/Output Summary (Last 24 hours) at 1/17/2025 2248  Last data filed at 1/17/2025 1603  Gross per 24 hour   Intake 2723 ml   Output 1490 ml   Net 1233 ml       Last 3 Weights   01/17/25 0500 227 lb 8.2 oz (103.2 kg)   01/16/25 0530 225 lb 1.6 oz (102.1 kg)   01/15/25 1743 225 lb (102.1 kg)   01/13/25 0842 227 lb (103 kg)   06/16/22 1207 222 lb (100.7 kg)       Exam   Gen: Alert, no acute distress  Heent: Normocephalic, atraumatic, neck supple, EOMI, PERRLA  Pulm: Lungs CTAB, normal respiratory effort  CV:  Regular rate and rhythm, no murmurs/rubs/gallops  Abd: Soft, nontender, nondistended, bowel sounds present  Extremities: No peripheral edema, no clubbing, pulses intact   Skin: No rashes or lesions  Neuro: AOx3, no focal neurologic deficits, CN II-XII grossly intact  Psych: appropriate mood and affect       Data Review:       Labs:     Recent Labs   Lab 01/16/25  1010 01/16/25  1508 01/16/25  1735 01/17/25  0319   RBC 4.77  --  3.25* 3.18*   HGB 15.1  --  10.3* 9.9*   HCT 42.3  --  28.8* 28.1*   MCV 88.7  --  88.6 88.4   MCH 31.7  --  31.7 31.1   MCHC 35.7  --  35.8 35.2   RDW 13.1  --  13.1 12.9   NEPRELIM  --   --   --  12.44*   WBC 6.8  --  12.9* 13.8*   .0 137.0* 164.0 151.0         Recent Labs   Lab 01/16/25  1010 01/16/25  1735 01/17/25 0319   GLU 98 144* 117*  117*   BUN 12 13  15  15   CREATSERUM 0.85 0.91 0.92  0.92   EGFRCR 96 93 92  92   CA 9.6 8.3* 8.4*  8.4*    146* 144  144   K 4.0 3.7 4.1  4.1    108 111  111   CO2 25.0 26.0 24.0  24.0       Recent Labs   Lab 01/15/25  1930 01/16/25  1010 01/17/25  0319   ALT 35  --   --    AST 34*  --   --    ALB 4.4 4.6 3.5         Imaging:  XR CHEST AP PORTABLE  (CPT=71045)    Result Date: 1/17/2025  CONCLUSION:  Interval removal of lines and tubes.  No acute cardiopulmonary disease.   LOCATION:  [facility]      Dictated by (CST): Lisa Rowley DO on 1/17/2025 at 3:33 PM     Finalized by (CST): Lisa Rowley DO on 1/17/2025 at 3:34 PM       XR CHEST AP PORTABLE  (CPT=71045)    Result Date: 1/17/2025  CONCLUSION:  No significant interval change.   LOCATION:  [id number]      Dictated by (CST): Evan Shah MD on 1/17/2025 at 7:02 AM     Finalized by (CST): Evan Shah MD on 1/17/2025 at 7:03 AM       XR CHEST AP PORTABLE  (CPT=71045)    Result Date: 1/16/2025  CONCLUSION:     1. Endotracheal tube 4.6 cm above the chronic, nasogastric tube present tip in the proximal stomach.  Left chest tube, mediastinal drain.  [place]-Katie catheter tip in the right main pulmonary outflow.  2. Mild vascular congestion without overt CHF.  Mild basilar atelectasis.  No sign of pneumothorax.   LOCATION:  [facility]      Dictated by (CST): Anthony Garcia MD on 1/16/2025 at 6:52 PM     Finalized by (CST): Anthony Garcia MD on 1/16/2025 at 6:53 PM       XR CHEST AP PORTABLE  (CPT=71045)    Result Date: 1/15/2025  CONCLUSION:  Normal cardiac and mediastinal contours.  No pulmonary edema or focal airspace consolidation.  The pleural spaces are clear.   LOCATION:  [id number]      Dictated by (CST): North Contreras MD on 1/15/2025 at 6:21 PM     Finalized by (CST): North Contreras MD on 1/15/2025 at 6:22 PM          Meds:      insulin aspart  1-5 Units Subcutaneous TID AC and HS    rosuvastatin  40 mg Oral Nightly    aspirin  81 mg Oral Daily    sennosides  8.6 mg Oral  BID    docusate sodium  100 mg Oral BID    metoprolol tartrate  12.5 mg Oral 2x Daily(Beta Blocker)    mupirocin  1 Application Nasal BID    pantoprazole  40 mg Intravenous QAM AC    Or    pantoprazole  40 mg Oral QAM AC    ceFAZolin  2 g Intravenous Q8H    cetirizine  10 mg Oral Daily      sodium chloride 10 mL/hr at 01/16/25 1735    sodium chloride 10 mL/hr at 01/16/25 1735    HYDROmorphone in sodium chloride 0.9% Stopped (01/17/25 9367)       HYDROcodone-acetaminophen **OR** HYDROcodone-acetaminophen    ipratropium-albuterol    acetaminophen    melatonin    polyethylene glycol (PEG 3350)    bisacodyl    ondansetron    morphINE **OR** morphINE    naloxone    diphenhydrAMINE    glucose **OR** glucose **OR** glucose-vitamin C **OR** dextrose **OR** glucose **OR** glucose **OR** glucose-vitamin C       Assessment/Plan:     64 year old male with a history of hyperlipidemia and CAD who underwent cardiac cath on 1/15 with Dr. Grewal showing critical ostial left main stenosis with good distal targets for bypass. Underwent successful bypass 1/16.      CAD with severe ostial left main stenosis s/p 2V CABG - LIMA LAD SVG OM (1/16/25)   -Tolerated procedure well  -Management per CT surgery, cardiology  -ASA, statin  -metoprolol started today.   -One time dose lasix today - monitor I's/O's   -Chest tubes removed today    -Off insulin ggt, transitioned to sliding scale for sugars - glucose levels stable  -PT/OT/cardiac rehab   -Monitor RFP - creatinine stable today      Hyperlipidemia  -Statin     Dispo: Monitoring in cardiac ICU.     Outpatient records reviewed. Questions/concerns were discussed with patient and/or family by bedside.   A total of 52 minutes were taken with patient and coordinating care.     DO Megha Haynes [facility]  Hospitalist  Contact via [employer]Connect[employer]/SomethingIndie    Supplementary Documentation:   DVT Mechanical Prophylaxis: GLORIA hose,      DVT Pharmacologic Prophylaxis   Medication   None      DVT  Pharmacologic prophylaxis: Aspirin 162 mg         Code Status: Not on file  Corbett: No urinary catheter in place  Corbett Duration (in days): 1  Central line:    TAN:

## 2025-01-18 NOTE — PROGRESS NOTES
[facility] Cardiology  Consultation Note      Juan Pablo Lam Patient Status:  Inpatient    1960 MRN [id number]   Location [facility] CARDIOVASCULAR SURGERY Attending Mathew Grewal MD   Hosp Day # 2 PCP Parker Clancy MD     Reason for consult: CAD    Events: Stable. Sternal pain controlled.     History of Present Illness:  Juan Pablo Lam is a 64 year old male who presented to [facility] on 1/15/2025 for elective cath. Had abnormal nuc stress. Chest pressure at high intensity exertion only, last few months. None at rest. Cath with ostial left main stenosis. .     Medications:  Current Facility-Administered Medications   Medication Dose Route Frequency    insulin aspart (NovoLOG) 100 Units/mL FlexPen 1-5 Units  1-5 Units Subcutaneous TID AC and HS    HYDROcodone-acetaminophen (Norco) 5-325 MG per tab 1 tablet  1 tablet Oral Q4H PRN    Or    HYDROcodone-acetaminophen (Norco) 5-325 MG per tab 2 tablet  2 tablet Oral Q4H PRN    ipratropium-albuterol (Duoneb) 0.5-2.5 (3) MG/3ML inhalation solution 3 mL  3 mL Nebulization Q4H PRN    aspirin chewable tab 81 mg  81 mg Oral Daily    sodium chloride 0.9% infusion   Intravenous Continuous    acetaminophen (Ofirmev) 10 mg/mL infusion premix 1,000 mg  1,000 mg Intravenous Q6H PRN    melatonin tab 3 mg  3 mg Oral Nightly PRN    sennosides (Senokot) tab 8.6 mg  8.6 mg Oral BID    docusate sodium (Colace) cap 100 mg  100 mg Oral BID    polyethylene glycol (PEG 3350) (Miralax) 17 g oral packet 17 g  17 g Oral Daily PRN    bisacodyl (Dulcolax) 10 MG rectal suppository 10 mg  10 mg Rectal Daily PRN    ondansetron (Zofran) 4 MG/2ML injection 4 mg  4 mg Intravenous Q6H PRN    metoprolol tartrate (Lopressor) partial tab 12.5 mg  12.5 mg Oral 2x Daily(Beta Blocker)    mupirocin (Bactroban) 2% nasal ointment 1 Application  1 Application Nasal BID    morphINE PF 2 MG/ML injection 2 mg  2 mg Intravenous Q2H PRN    Or    morphINE PF 4 MG/ML injection 4 mg  4 mg  Intravenous Q2H PRN    pantoprazole (Protonix) 40 mg in sodium chloride 0.9% PF 10 mL IV push  40 mg Intravenous QAM AC    Or    pantoprazole (Protonix) DR tab 40 mg  40 mg Oral QAM AC    ceFAZolin (Ancef) 2g in 10mL IV syringe premix  2 g Intravenous Q8H    sodium chloride 0.9% infusion   Intravenous Continuous    HYDROmorphone in sodium chloride 0.9% (Dilaudid) 20 mg/100mL PCA premix   Intravenous Continuous    naloxone (Narcan) 0.4 MG/ML injection 0.08 mg  0.08 mg Intravenous Q5 Min PRN    diphenhydrAMINE (Benadryl) 50 mg/mL  injection 12.5 mg  12.5 mg Intravenous Q4H PRN    glucose (Dex4) 15 GM/59ML oral liquid 15 g  15 g Oral Q15 Min PRN    Or    glucose (Glutose) 40% oral gel 15 g  15 g Oral Q15 Min PRN    Or    glucose-vitamin C (Dex-4) chewable tab 4 tablet  4 tablet Oral Q15 Min PRN    Or    dextrose 50% injection 50 mL  50 mL Intravenous Q15 Min PRN    Or    glucose (Dex4) 15 GM/59ML oral liquid 30 g  30 g Oral Q15 Min PRN    Or    glucose (Glutose) 40% oral gel 30 g  30 g Oral Q15 Min PRN    Or    glucose-vitamin C (Dex-4) chewable tab 8 tablet  8 tablet Oral Q15 Min PRN    cetirizine (ZyrTEC) tab 10 mg  10 mg Oral Daily    rosuvastatin (Crestor) tab 20 mg  20 mg Oral Nightly       Past Medical History:    Coronary atherosclerosis    High cholesterol       Past Surgical History:   Procedure Laterality Date    [place] teeth removed      1982       Family History  family history is not on file.    Social History   reports that he has never smoked. He has never used smokeless tobacco. He reports current alcohol use. He reports that he does not use drugs.     Allergies  Allergies[1]    Review of Systems:  As per HPI, otherwise 10 point ROS is negative in detail.    Physical Exam:  Blood pressure 120/57, pulse 110, temperature 98.3 °F (36.8 °C), temperature source Temporal, resp. rate 21, height 70\", weight 227 lb 8.2 oz (103.2 kg), SpO2 92%.  Temp (24hrs), Av.3 °F (36.8 °C), Min:97.6 °F (36.4 °C), Max:98.9  °F (37.2 °C)    Wt Readings from Last 3 Encounters:   01/17/25 227 lb 8.2 oz (103.2 kg)   06/16/22 222 lb (100.7 kg)       General: Awake and alert; in no acute distress  HEENT: Extraocular movements are intact; sclerae are anicteric; scalp is atraumatic  Neck: Supple; no JVD; no carotid bruits  Cardiac: Regular rate and regular rhythm; normal S1 and S2, no murmurs, rubs, or gallops are appreciated  Lungs: Clear to auscultation bilaterally; no accessory muscle use is noted, no wheezes, rhonci or rales  Abdomen: Soft, non-distended, non-tender; bowel sounds are normoactive  Extremities: Warm, no edema, clubbing or cyanosis; moves all 4 extremities normally, distal pulses intact and equal  Psychiatric: Normal mood and affect; answers questions appropriately  Dermatologic: No rashes; normal skin turgor    Diagnostic testing:    Labs:   Lab Results   Component Value Date    INR 0.74 (L) 01/16/2025    INR 1.49 (H) 01/16/2025        Lab Results   Component Value Date    WBC 13.8 01/17/2025    HGB 9.9 01/17/2025    HCT 28.1 01/17/2025    .0 01/17/2025    CREATSERUM 0.92 01/17/2025    CREATSERUM 0.92 01/17/2025    BUN 15 01/17/2025    BUN 15 01/17/2025     01/17/2025     01/17/2025    K 4.1 01/17/2025    K 4.1 01/17/2025     01/17/2025     01/17/2025    CO2 24.0 01/17/2025    CO2 24.0 01/17/2025     01/17/2025     01/17/2025    CA 8.4 01/17/2025    CA 8.4 01/17/2025    ALB 3.5 01/17/2025    MG 2.0 01/17/2025    PHOS 2.7 01/17/2025    PGLU 127 01/17/2025       Cardiac diagnostics:    Telemetry: Predominantly sinus rhythm, no malignant arrhythmias     Impression:  64 year old male with stable angina but severe ostial left main stenosis.   S/p 2V CABG: LIMA LAD SVG OM (1/16/25)    Recommendations:  ASA, statin  Start metoprolol and titrate for BP and HR control  Mild volume OL, Lasix x 1 today, reassess tomorrow.  Chest tubes out and deline today per CTS    Thank you for allowing  our practice to participate in the care of your patient. Please do not hesitate to contact me if you have any questions.    Mathew Grewal MD  Interventional Cardiology  [facility]  Office: [phone]       [1] No Known Allergies

## 2025-01-18 NOTE — PHYSICAL THERAPY NOTE
PHYSICAL THERAPY EVALUATION - INPATIENT     Room Number: 6603/6603-A  Evaluation Date: 1/18/2025  Type of Evaluation: Initial  Physician Order: PT Eval and Treat    Presenting Problem: s/p coronary revascularization x2, L internal mammary artery graft to the L anterior descending and saphenous vein graft to the obtuse marginal 1/16  Co-Morbidities : HL, CAD  Reason for Therapy: Mobility Dysfunction and Discharge Planning    PHYSICAL THERAPY ASSESSMENT   Patient is a 64 year old male admitted 1/15/2025 for s/p coronary revascularization x2, L internal mammary artery graft to the L anterior descending and saphenous vein graft to the obtuse marginal 1/16.  Prior to admission, patient's baseline is fully independent.  Patient is currently functioning below baseline with bed mobility, transfers, gait, stair negotiation, maintaining seated position, and standing prolonged periods.  Patient is requiring contact guard assist as a result of the following impairments: decreased functional strength, decreased endurance/aerobic capacity, pain, impaired standing balance, decreased muscular endurance, and decreased functional activity tolerance .  Physical Therapy will continue to follow for duration of hospitalization.    Patient will benefit from continued skilled PT Services at discharge to promote prior level of function.  Anticipate patient will return home with home health PT.    PLAN DURING HOSPITALIZATION  Nursing Mobility Recommendation : 1 Assist  PT Device Recommendation: Rolling walker;Gait belt  PT Treatment Plan: Bed mobility;Body mechanics;Endurance;Energy conservation;Patient education;Family education;Gait training;Strengthening;Stair training;Transfer training  Rehab Potential : Good  Frequency (Obs): 3-5x/week     CURRENT GOALS    Goal #1 Patient is able to demonstrate supine - sit EOB @ level: independent     Goal #2 Patient is able to demonstrate transfers Sit to/from Stand at assistance level: modified  independent     Goal #3 Patient is able to ambulate 150 feet with assist device: walker - rolling at assistance level: modified independent     Goal #4 Patient will ascend/descend 12 steps with supervision and handrail.    Goal #5    Goal #6    Goal Comments: Goals established on 1/18/2025      PHYSICAL THERAPY MEDICAL/SOCIAL HISTORY  History related to current admission: Patient is a 64 year old male admitted on 1/15/2025 from home for s/p coronary revascularization x2, L internal mammary artery graft to the L anterior descending and saphenous vein graft to the obtuse marginal 1/16.      HOME SITUATION  Type of Home: House  Home Layout: Two level  Stairs to Enter : 2   Railing: Yes    Stairs to Bedroom: 12    Railing: Yes    Lives With: Spouse    Drives: Yes   Patient Regularly Uses: Glasses      Prior Level of [place]: Patient reports he is fully independent at baseline. Enjoys camping. Has cane and RW available for use at home. Career in paper sales.    SUBJECTIVE  \"I walked out to the desk yesterday.\"     OBJECTIVE  Precautions: Sternal;Cardiac  Fall Risk: High fall risk    WEIGHT BEARING RESTRICTION     PAIN ASSESSMENT  Rating: 3  Location: chest - incisional  Management Techniques: Activity promotion;Breathing techniques;Repositioning    COGNITION  Overall Cognitive Status:  WFL - within functional limits  Following Commands:  follows all commands and directions without difficulty    RANGE OF MOTION AND STRENGTH ASSESSMENT  Upper extremity ROM and strength - defer to OT assessment    Lower extremity ROM is within functional limits     Lower extremity strength is within functional limits     BALANCE  Static Sitting: Good  Dynamic Sitting: Good  Static Standing: Fair +  Dynamic Standing: Fair +    ADDITIONAL TESTS                                    ACTIVITY TOLERANCE  Pulse: 95  Heart Rate Source: Monitor     BP: 132/60  BP Location: Right arm  BP Method: Automatic  Patient Position: Sitting    O2  WALK  Oxygen Therapy  SPO2% on Room Air at Rest: 97    NEUROLOGICAL FINDINGS                        AM-PAC '6-Clicks' INPATIENT SHORT FORM - BASIC MOBILITY  How much difficulty does the patient currently have...  Patient Difficulty: Turning over in bed (including adjusting bedclothes, sheets and blankets)?: A Little   Patient Difficulty: Sitting down on and standing up from a chair with arms (e.g., wheelchair, bedside commode, etc.): A Little   Patient Difficulty: Moving from lying on back to sitting on the side of the bed?: A Little   How much help from another person does the patient currently need...   Help from Another: Moving to and from a bed to a chair (including a wheelchair)?: A Little   Help from Another: Need to walk in hospital room?: A Little   Help from Another: Climbing 3-5 steps with a railing?: A Little     AM-PAC Score:  Raw Score: 18   Approx Degree of Impairment: 46.58%   Standardized Score (AM-PAC Scale): 43.63   CMS Modifier (G-Code): CK    FUNCTIONAL ABILITY STATUS  Gait Assessment   Functional Mobility/Gait Assessment  Gait Assistance: Contact guard assist  Distance (ft): 115  Assistive Device: Rolling walker  Pattern: Within Functional Limits    Skilled Therapy Provided     Bed Mobility:  Rolling: through logrolling  Supine to sit: through logrolling   Sit to supine: through logrolling, Narendra for BLE's      Transfer Mobility:  Sit to stand: CGA, RW, maintains sternal precautions   Stand to sit: CGA, RW  Gait = x115 ft., RW, CGA    Therapist's Comments: Patient presents to PT sitting in the bedside chair, VSS and weaned to room air - SpO2 97%. Reviewed sternal precautions and patient able to maintain well for sit <> stand transfers to RW. CGA for short distance ambulation using RW within the halls. Seated rest break on the side of the bed, instructed in pursed lip breathing following ambulation. Return to supine in bed through logrolling with Narendra for BLE management at the end of the session.  HOB raised and pillow provided for bracing during coughing, sneezing, etc. B SCD's replaced and all needs placed within reach. Discussed PT POC including compliance with walking program using RW and staff assistance 3x/day. Encouraged patient to sit in the chair for all meals. Patient verbalized understanding and demonstration of all education. Patient will continue to benefit from skilled IP PT to progress his strength, functional mobility, gait and overall activity tolerance for progression towards regaining his baseline, independent function. Recommend dc home with use of RW and HHPT.     Exercise/Education Provided:  Bed mobility  Body mechanics  Energy conservation  Functional activity tolerated  Gait training  Posture  Transfer training    Patient End of Session: In bed;Needs met;Call light within reach;RN aware of session/findings;All patient questions and concerns addressed;Hospital anti-slip socks;SCDs in place      Patient Evaluation Complexity Level:  History Low - no personal factors and/or co-morbidities   Examination of body systems Moderate - addressing a total of 3 or more elements   Clinical Presentation  Moderate - Evolving   Clinical Decision Making Moderate Complexity       PT Session Time: 25 minutes  Gait Training: 15 minutes  Therapeutic Activity: 3 minutes  Neuromuscular Re-education: 0 minutes  Therapeutic Exercise: 0 minutes

## 2025-01-18 NOTE — PROGRESS NOTES
[facility] Hospitalist Progress Note     CC: Hospital Follow up    PCP: Parker Clancy MD       Subjective:     Patient seen and examined.  No complaints.  Denies CP/SOB.  NAD.     OBJECTIVE:    Blood pressure 141/65, pulse 96, temperature 98.3 °F (36.8 °C), temperature source Temporal, resp. rate 16, height 5' 10\" (1.778 m), weight 228 lb 9.6 oz (103.7 kg), SpO2 92%.    Temp:  [98.3 °F (36.8 °C)-98.9 °F (37.2 °C)] 98.3 °F (36.8 °C)  Pulse:  [] 96  Resp:  [16-36] 16  BP: (110-184)/() 141/65  SpO2:  [92 %-100 %] 92 %      Intake/Output:    Intake/Output Summary (Last 24 hours) at 1/18/2025 0939  Last data filed at 1/18/2025 0914  Gross per 24 hour   Intake 1021 ml   Output 1425 ml   Net -404 ml       Last 3 Weights   01/18/25 0600 228 lb 9.6 oz (103.7 kg)   01/17/25 0500 227 lb 8.2 oz (103.2 kg)   01/16/25 0530 225 lb 1.6 oz (102.1 kg)   01/15/25 1743 225 lb (102.1 kg)   01/13/25 0842 227 lb (103 kg)   06/16/22 1207 222 lb (100.7 kg)       Exam   Gen: Alert, no acute distress  Heent: Normocephalic, atraumatic, neck supple, EOMI, PERRLA  Pulm: Lungs CTAB, normal respiratory effort  CV:  Regular rate and rhythm, no murmurs/rubs/gallops  Abd: Soft, nontender, nondistended, bowel sounds present  Extremities: No peripheral edema, no clubbing, pulses intact   Skin: No rashes or lesions  Neuro: AOx3, no focal neurologic deficits, CN II-XII grossly intact  Psych: appropriate mood and affect       Data Review:       Labs:     Recent Labs   Lab 01/16/25  1735 01/17/25  0319 01/18/25  0340   RBC 3.25* 3.18* 2.93*   HGB 10.3* 9.9* 9.4*   HCT 28.8* 28.1* 26.7*   MCV 88.6 88.4 91.1   MCH 31.7 31.1 32.1   MCHC 35.8 35.2 35.2   RDW 13.1 12.9 13.9   NEPRELIM  --  12.44*  --    WBC 12.9* 13.8* 20.6*   .0 151.0 174.0         Recent Labs   Lab 01/16/25  1735 01/17/25  0319 01/18/25  0340   * 117*  117* 153*  153*   BUN 13 15  15 26*  26*   CREATSERUM 0.91 0.92  0.92 1.16  1.16   EGFRCR 93  92  92 69  69   CA 8.3* 8.4*  8.4* 8.7  8.7   * 144  144 140  140   K 3.7 4.1  4.1 4.2  4.2    111  111 105  105   CO2 26.0 24.0  24.0 27.0  27.0       Recent Labs   Lab 01/15/25  1930 01/16/25  1010 01/17/25  0319 01/18/25  0340   ALT 35  --   --   --    AST 34*  --   --   --    ALB 4.4 4.6 3.5 4.0         Imaging:  XR CHEST AP PORTABLE  (CPT=71045)    Result Date: 1/17/2025  CONCLUSION:  Interval removal of lines and tubes.  No acute cardiopulmonary disease.   LOCATION:  [facility]      Dictated by (CST): Lisa Rowley DO on 1/17/2025 at 3:33 PM     Finalized by (CST): Lisa Rowley DO on 1/17/2025 at 3:34 PM       XR CHEST AP PORTABLE  (CPT=71045)    Result Date: 1/17/2025  CONCLUSION:  No significant interval change.   LOCATION:  [id number]      Dictated by (CST): Evan Shah MD on 1/17/2025 at 7:02 AM     Finalized by (CST): Evan Shah MD on 1/17/2025 at 7:03 AM       XR CHEST AP PORTABLE  (CPT=71045)    Result Date: 1/16/2025  CONCLUSION:     1. Endotracheal tube 4.6 cm above the chronic, nasogastric tube present tip in the proximal stomach.  Left chest tube, mediastinal drain.  [place]-Katie catheter tip in the right main pulmonary outflow.  2. Mild vascular congestion without overt CHF.  Mild basilar atelectasis.  No sign of pneumothorax.   LOCATION:  [facility]      Dictated by (CST): Anthony Garcia MD on 1/16/2025 at 6:52 PM     Finalized by (CST): Anthony Garcia MD on 1/16/2025 at 6:53 PM       XR CHEST AP PORTABLE  (CPT=71045)    Result Date: 1/15/2025  CONCLUSION:  Normal cardiac and mediastinal contours.  No pulmonary edema or focal airspace consolidation.  The pleural spaces are clear.   LOCATION:  [id number]      Dictated by (CST): North Contreras MD on 1/15/2025 at 6:21 PM     Finalized by (CST): North Contreras MD on 1/15/2025 at 6:22 PM          Meds:      furosemide  40 mg Oral Daily    insulin aspart  1-5 Units Subcutaneous TID AC and HS    rosuvastatin  40 mg Oral Nightly    aspirin  81  mg Oral Daily    sennosides  8.6 mg Oral BID    docusate sodium  100 mg Oral BID    metoprolol tartrate  12.5 mg Oral 2x Daily(Beta Blocker)    mupirocin  1 Application Nasal BID    pantoprazole  40 mg Intravenous QAM AC    Or    pantoprazole  40 mg Oral QAM AC    cetirizine  10 mg Oral Daily      sodium chloride 10 mL/hr at 01/16/25 1735    sodium chloride 10 mL/hr at 01/16/25 1735    HYDROmorphone in sodium chloride 0.9% Stopped (01/17/25 1547)       HYDROcodone-acetaminophen **OR** HYDROcodone-acetaminophen    ipratropium-albuterol    melatonin    polyethylene glycol (PEG 3350)    bisacodyl    ondansetron    morphINE **OR** morphINE    naloxone    diphenhydrAMINE    glucose **OR** glucose **OR** glucose-vitamin C **OR** dextrose **OR** glucose **OR** glucose **OR** glucose-vitamin C       Assessment/Plan:     64 year old male with a history of hyperlipidemia and CAD who underwent cardiac cath on 1/15 with Dr. Grewal showing critical ostial left main stenosis with good distal targets for bypass. Underwent successful bypass 1/16.      CAD with severe ostial left main stenosis s/p 2V CABG - LIMA LAD SVG OM (1/16/25)   -Tolerated procedure well  -Management per CT surgery, cardiology  -ASA, statin  -metoprolol started    -One time dose lasix, prn per cardiology- monitor I's/O's   -Chest tubes removed 1/17  -Off insulin ggt, transitioned to sliding scale for sugars - glucose levels stable  -PT/OT/cardiac rehab   -Monitor RFP - creatinine stable today      Hyperlipidemia  -Statin     Dispo: Monitoring in cardiac ICU.     Sarah Weiss MD  [facility]  Hospitalist  Contact via [email or web address]    Supplementary Documentation:   DVT Mechanical Prophylaxis: GLORIA hose,      DVT Pharmacologic Prophylaxis   Medication   None      DVT Pharmacologic prophylaxis: Aspirin 162 mg         Code Status: Not on file  Corbett: No urinary catheter in place  Corbett Duration (in days): 1  Central line:    TAN:

## 2025-01-18 NOTE — PROGRESS NOTES
[facility]   part of [facility]      [facility] Progress Note    Juan Pablo Lam Patient Status:  Inpatient    1960 MRN [id number]   Location [facility] 6NE-A Attending Mathew Grewal MD   Hosp Day # 3 PCP Parker Clancy MD     Subjective:  Sitting up in chair, pain is well controlled. Already walked the halls     Objective:  /59   Pulse 96   Temp 98.3 °F (36.8 °C) (Temporal)   Resp 14   Ht 177.8 cm (5' 10\")   Wt 103.7 kg (228 lb 9.6 oz)   SpO2 (!) 88%   BMI 32.80 kg/m²        Intake/Output:    Intake/Output Summary (Last 24 hours) at 2025 1222  Last data filed at 2025 1045  Gross per 24 hour   Intake 778 ml   Output 1835 ml   Net -1057 ml     Allergies:  Allergies[1]    Current Facility-Administered Medications   Medication Dose Route Frequency    guaiFENesin ER (Mucinex) 12 hr tab 1,200 mg  1,200 mg Oral BID    furosemide (Lasix) 10 mg/mL injection 40 mg  40 mg Intravenous BID (Diuretic)    insulin aspart (NovoLOG) 100 Units/mL FlexPen 1-5 Units  1-5 Units Subcutaneous TID AC and HS    HYDROcodone-acetaminophen (Norco) 5-325 MG per tab 1 tablet  1 tablet Oral Q4H PRN    Or    HYDROcodone-acetaminophen (Norco) 5-325 MG per tab 2 tablet  2 tablet Oral Q4H PRN    rosuvastatin (Crestor) tab 40 mg  40 mg Oral Nightly    ipratropium-albuterol (Duoneb) 0.5-2.5 (3) MG/3ML inhalation solution 3 mL  3 mL Nebulization Q4H PRN    aspirin chewable tab 81 mg  81 mg Oral Daily    sodium chloride 0.9% infusion   Intravenous Continuous    melatonin tab 3 mg  3 mg Oral Nightly PRN    sennosides (Senokot) tab 8.6 mg  8.6 mg Oral BID    docusate sodium (Colace) cap 100 mg  100 mg Oral BID    polyethylene glycol (PEG 3350) (Miralax) 17 g oral packet 17 g  17 g Oral Daily PRN    bisacodyl (Dulcolax) 10 MG rectal suppository 10 mg  10 mg Rectal Daily PRN    ondansetron (Zofran) 4 MG/2ML injection 4 mg  4 mg Intravenous Q6H PRN    metoprolol tartrate (Lopressor) partial tab 12.5 mg  12.5 mg  Oral 2x Daily(Beta Blocker)    mupirocin (Bactroban) 2% nasal ointment 1 Application  1 Application Nasal BID    morphINE PF 2 MG/ML injection 2 mg  2 mg Intravenous Q2H PRN    Or    morphINE PF 4 MG/ML injection 4 mg  4 mg Intravenous Q2H PRN    pantoprazole (Protonix) 40 mg in sodium chloride 0.9% PF 10 mL IV push  40 mg Intravenous QAM AC    Or    pantoprazole (Protonix) DR tab 40 mg  40 mg Oral QAM AC    sodium chloride 0.9% infusion   Intravenous Continuous    HYDROmorphone in sodium chloride 0.9% (Dilaudid) 20 mg/100mL PCA premix   Intravenous Continuous    naloxone (Narcan) 0.4 MG/ML injection 0.08 mg  0.08 mg Intravenous Q5 Min PRN    diphenhydrAMINE (Benadryl) 50 mg/mL  injection 12.5 mg  12.5 mg Intravenous Q4H PRN    glucose (Dex4) 15 GM/59ML oral liquid 15 g  15 g Oral Q15 Min PRN    Or    glucose (Glutose) 40% oral gel 15 g  15 g Oral Q15 Min PRN    Or    glucose-vitamin C (Dex-4) chewable tab 4 tablet  4 tablet Oral Q15 Min PRN    Or    dextrose 50% injection 50 mL  50 mL Intravenous Q15 Min PRN    Or    glucose (Dex4) 15 GM/59ML oral liquid 30 g  30 g Oral Q15 Min PRN    Or    glucose (Glutose) 40% oral gel 30 g  30 g Oral Q15 Min PRN    Or    glucose-vitamin C (Dex-4) chewable tab 8 tablet  8 tablet Oral Q15 Min PRN    cetirizine (ZyrTEC) tab 10 mg  10 mg Oral Daily       Labs:  Lab Results   Component Value Date    WBC 20.6 01/18/2025    HGB 9.4 01/18/2025    HCT 26.7 01/18/2025    .0 01/18/2025    CREATSERUM 1.16 01/18/2025    CREATSERUM 1.16 01/18/2025    BUN 26 01/18/2025    BUN 26 01/18/2025     01/18/2025     01/18/2025    K 4.2 01/18/2025    K 4.2 01/18/2025     01/18/2025     01/18/2025    CO2 27.0 01/18/2025    CO2 27.0 01/18/2025     01/18/2025     01/18/2025    CA 8.7 01/18/2025    CA 8.7 01/18/2025    ALB 4.0 01/18/2025       Chest Xray: none today     Physical Exam:    Neuro: alert and oriented, intact   Lungs: CTA, IS 1000  Heart: s1s2 RRR,  NSR sternum stable   Abdomen: soft, nontender +BS   Extremities:+pulses, trace dependent edema        Assessment/Plan:  Patient Active Problem List   Diagnosis    Abnormal stress test       POD# 2 s/p CABG x2   - HD stable, off gtts   - PW remain IN  - neuro intact  - post op anemia, stable/ monitor  - leukocytosis, no fevers. Likely reactive. Monitor  - renal function intact. Fluid overload, lasix ordered  - pain management, norco  - PPX: TEDs/ SCDs/ protonix   - Encourage deep breathing, coughing, I.S. Add flutter valve  - Increase activity. Ambulating in [facility] to transfer to [facility]    D/W Dr Tania HERRERA, RN  1/18/2025  12:22 PM         [1] No Known Allergies

## 2025-01-18 NOTE — PLAN OF CARE
Assumed care @ 1930. Patient is A&O x4. Follows command. On tele-ST. On 2L O2- unable to wean down. SBP 150s-attributing to pain. Pt c/o pain to sternal incision-norco prn given. Pacer wires secured and taped. Pt due to void-bladder scanned. Pt was able to void on own in urinal. Generalized edema. Call light within reach. All needs met at this time.

## 2025-01-18 NOTE — PROGRESS NOTES
[facility] Cardiology  Consultation Note      Juan Pablo Lam Patient Status:  Inpatient    1960 MRN [id number]   Location [facility] CARDIOVASCULAR SURGERY Attending Mathew Grewal MD   Hosp Day # 3 PCP Parker Clancy MD     Reason for consult: CAD    Events: BP labile     History of Present Illness:  Juan Pablo Lam is a 64 year old male who presented to [facility] on 1/15/2025 for elective cath. Had abnormal nuc stress. Chest pressure at high intensity exertion only, last few months. None at rest. Cath with ostial left main stenosis. .     Medications:  Current Facility-Administered Medications   Medication Dose Route Frequency    insulin aspart (NovoLOG) 100 Units/mL FlexPen 1-5 Units  1-5 Units Subcutaneous TID AC and HS    HYDROcodone-acetaminophen (Norco) 5-325 MG per tab 1 tablet  1 tablet Oral Q4H PRN    Or    HYDROcodone-acetaminophen (Norco) 5-325 MG per tab 2 tablet  2 tablet Oral Q4H PRN    rosuvastatin (Crestor) tab 40 mg  40 mg Oral Nightly    ipratropium-albuterol (Duoneb) 0.5-2.5 (3) MG/3ML inhalation solution 3 mL  3 mL Nebulization Q4H PRN    aspirin chewable tab 81 mg  81 mg Oral Daily    sodium chloride 0.9% infusion   Intravenous Continuous    melatonin tab 3 mg  3 mg Oral Nightly PRN    sennosides (Senokot) tab 8.6 mg  8.6 mg Oral BID    docusate sodium (Colace) cap 100 mg  100 mg Oral BID    polyethylene glycol (PEG 3350) (Miralax) 17 g oral packet 17 g  17 g Oral Daily PRN    bisacodyl (Dulcolax) 10 MG rectal suppository 10 mg  10 mg Rectal Daily PRN    ondansetron (Zofran) 4 MG/2ML injection 4 mg  4 mg Intravenous Q6H PRN    metoprolol tartrate (Lopressor) partial tab 12.5 mg  12.5 mg Oral 2x Daily(Beta Blocker)    mupirocin (Bactroban) 2% nasal ointment 1 Application  1 Application Nasal BID    morphINE PF 2 MG/ML injection 2 mg  2 mg Intravenous Q2H PRN    Or    morphINE PF 4 MG/ML injection 4 mg  4 mg Intravenous Q2H PRN    pantoprazole (Protonix) 40 mg in  sodium chloride 0.9% PF 10 mL IV push  40 mg Intravenous QAM AC    Or    pantoprazole (Protonix) DR tab 40 mg  40 mg Oral QAM AC    ceFAZolin (Ancef) 2g in 10mL IV syringe premix  2 g Intravenous Q8H    sodium chloride 0.9% infusion   Intravenous Continuous    HYDROmorphone in sodium chloride 0.9% (Dilaudid) 20 mg/100mL PCA premix   Intravenous Continuous    naloxone (Narcan) 0.4 MG/ML injection 0.08 mg  0.08 mg Intravenous Q5 Min PRN    diphenhydrAMINE (Benadryl) 50 mg/mL  injection 12.5 mg  12.5 mg Intravenous Q4H PRN    glucose (Dex4) 15 GM/59ML oral liquid 15 g  15 g Oral Q15 Min PRN    Or    glucose (Glutose) 40% oral gel 15 g  15 g Oral Q15 Min PRN    Or    glucose-vitamin C (Dex-4) chewable tab 4 tablet  4 tablet Oral Q15 Min PRN    Or    dextrose 50% injection 50 mL  50 mL Intravenous Q15 Min PRN    Or    glucose (Dex4) 15 GM/59ML oral liquid 30 g  30 g Oral Q15 Min PRN    Or    glucose (Glutose) 40% oral gel 30 g  30 g Oral Q15 Min PRN    Or    glucose-vitamin C (Dex-4) chewable tab 8 tablet  8 tablet Oral Q15 Min PRN    cetirizine (ZyrTEC) tab 10 mg  10 mg Oral Daily       Past Medical History:    Coronary atherosclerosis    High cholesterol       Past Surgical History:   Procedure Laterality Date    [place] teeth removed      1982       Family History  family history is not on file.    Social History   reports that he has never smoked. He has never used smokeless tobacco. He reports current alcohol use. He reports that he does not use drugs.     Allergies  Allergies[1]    Review of Systems:  As per HPI, otherwise 10 point ROS is negative in detail.    Physical Exam:  Blood pressure 124/53, pulse 90, temperature 98.8 °F (37.1 °C), temperature source Temporal, resp. rate 24, height 5' 10\" (1.778 m), weight 228 lb 9.6 oz (103.7 kg), SpO2 93%.  Temp (24hrs), Av.7 °F (37.1 °C), Min:98.3 °F (36.8 °C), Max:98.9 °F (37.2 °C)    Wt Readings from Last 3 Encounters:   25 228 lb 9.6 oz (103.7 kg)   22  222 lb (100.7 kg)       General: Awake and alert; in no acute distress  HEENT: Extraocular movements are intact; sclerae are anicteric; scalp is atraumatic  Neck: Supple; no JVD; no carotid bruits  Cardiac: Regular rate and regular rhythm; normal S1 and S2, no murmurs, rubs, or gallops are appreciated  Lungs: Clear to auscultation bilaterally; no accessory muscle use is noted, no wheezes, rhonci or rales  Abdomen: Soft, non-distended, non-tender; bowel sounds are normoactive  Extremities: Warm, no edema, clubbing or cyanosis; moves all 4 extremities normally, distal pulses intact and equal  Psychiatric: Normal mood and affect; answers questions appropriately  Dermatologic: No rashes; normal skin turgor    Diagnostic testing:    Labs:   Lab Results   Component Value Date    INR 0.74 (L) 01/16/2025    INR 1.49 (H) 01/16/2025        Lab Results   Component Value Date    WBC 20.6 01/18/2025    HGB 9.4 01/18/2025    HCT 26.7 01/18/2025    .0 01/18/2025    CREATSERUM 1.16 01/18/2025    CREATSERUM 1.16 01/18/2025    BUN 26 01/18/2025    BUN 26 01/18/2025     01/18/2025     01/18/2025    K 4.2 01/18/2025    K 4.2 01/18/2025     01/18/2025     01/18/2025    CO2 27.0 01/18/2025    CO2 27.0 01/18/2025     01/18/2025     01/18/2025    CA 8.7 01/18/2025    CA 8.7 01/18/2025    ALB 4.0 01/18/2025    MG 2.1 01/18/2025    PHOS 3.3 01/18/2025    PGLU 176 01/17/2025       Cardiac diagnostics:    Telemetry: Predominantly sinus rhythm, no malignant arrhythmias     Impression:  64 year old male with stable angina but severe ostial left main stenosis.   S/p 2V CABG: LIMA LAD SVG OM (1/16/25)    Recommendations:  ASA, statin  Continue metoprolol 12.5 mg BID. Labile blood pressures so will not add further anti-HTN regimen   Daily lasix 40 mg   Chest tubes out   Tele  Likely floor today         Malcom Styles MD           [1] No Known Allergies

## 2025-01-18 NOTE — OCCUPATIONAL THERAPY NOTE
OCCUPATIONAL THERAPY EVALUATION - INPATIENT     Room Number: 6603/6603-A  Evaluation Date: 1/18/2025  Type of Evaluation: Initial  Presenting Problem: S/p CABGx2 with LIMA-LAD SVG-OM (1/16/25)    Physician Order: IP Consult to Occupational Therapy  Reason for Therapy: ADL/IADL Dysfunction and Discharge Planning    OCCUPATIONAL THERAPY ASSESSMENT   Patient is currently functioning below baseline with toileting, bathing, lower body dressing, bed mobility, transfers, static standing balance, dynamic standing balance, functional standing tolerance, and energy conservation strategies. Prior to admission, patient's baseline is independent for ADL/IADLs and mobility without device.  Patient is requiring CGA for mobility/transfers, MOD A bed mobility, MIN A for LB ADLs as a result of the following impairments: decreased functional strength, decreased functional reach, decreased endurance, pain, impaired standing balance, and decreased muscular endurance. Occupational Therapy will continue to follow for duration of hospitalization.    Patient will benefit from continued skilled OT Services at discharge to promote prior level of function.  Anticipate patient will return home with home health OT    History Related to Current Admission: Patient is a 64 year old male admitted on 1/15/2025 from home for planned surgery. Pt is s/p CABGx2 with LIMA-LAD SVG-OM.     Co-Morbidities : HL, CAD    Recommendations for nursing staff:   Transfers: x1 with RW  Toileting location: elevated toilet seat     EVALUATION SESSION:  Patient Start of Session: seated in chair     FUNCTIONAL TRANSFER ASSESSMENT  Sit to Stand: Chair  Chair: Contact Guard Assist  Toilet Transfer: Contact Guard Assist    BED MOBILITY  Sit to Supine (OT): Moderate Assist  Scooting: SBA    BALANCE ASSESSMENT  Static Sitting: Independent  Static Standing: Stand-by Assist    FUNCTIONAL ADL ASSESSMENT  LB Dressing Seated: Minimal Assist    ACTIVITY TOLERANCE: Pt denies SOB,  dizziness or lightheadedness throughout session. No significant change in vitals noted. Education given re: energy conservation, pacing, and activity progression during ADL/IADLs and mobility at home. Stressed importance of allowing increased time for activities, modifying tasks, and planning for rest breaks as needed d/t fatigue and decreased endurance.                          O2 SATURATIONS: Pt rec'd on  .5L O2 with sats 95% at rest. Placed on room air for session and able to maintain 92% and above during mobility.        COGNITION  Overall Cognitive Status:  WFL - within functional limits    Upper Extremity   ROM: within functional limits   Coordination  Gross motor: intact   Fine motor: intact   Sensation: Light touch:  intact    EDUCATION PROVIDED  Patient Education : Role of Occupational Therapy; Plan of Care; DME Recommendations; Functional Transfer Techniques; Fall Prevention; Surgical Precautions; Posture/Positioning; Energy Conservation; Proper Body Mechanics  Patient's Response to Education: Verbalized Understanding; Requires Further Education; Returned Demonstration    Equipment used: RW  Demonstrates functional use, Would benefit from additional trial      Therapist comments: Pt is pleasant and motivated to participate in therapy. Initiated education re: CV binder including activity recommendations, log rolling, energy conservation, BUE HEP (to be completed next session), and precautions.     Patient End of Session: In bed;Call light within reach;Needs met;RN aware of session/findings;All patient questions and concerns addressed;Hospital anti-slip socks;SCDs in place    OCCUPATIONAL PROFILE    HOME SITUATION  Type of Home: House  Home Layout: Two level (2 STEPHANIE)  Lives With: Spouse    Toilet and Equipment: Toilet riser;Standard height toilet  Shower/Tub and Equipment: Walk-in shower  Other Equipment:  (RW, cane - does not use)    Occupation/Status: paper salesman  Hand Dominance: Right  Drives:  Yes  Patient Regularly Uses: Glasses    Prior Level of Function: Pt lives with his wife in 2 level home and is typically independent with ADL/IADLs and mobility without device. Pt and wife enjoy camping and own two campers, one near [place] and one in [place].     SUBJECTIVE   \"I was supposed to be in [place] right now.\"    PAIN ASSESSMENT  Ratin  Location: chest  Management Techniques: Activity promotion;Body mechanics;Breathing techniques;Relaxation    OBJECTIVE  Precautions: Sternal;Cardiac  Fall Risk: High fall risk    ASSESSMENTS    AM-PAC ‘6-Clicks’ Inpatient Daily Activity Short Form  -   Putting on and taking off regular lower body clothing?: A Little  -   Bathing (including washing, rinsing, drying)?: A Little  -   Toileting, which includes using toilet, bedpan or urinal? : A Little  -   Putting on and taking off regular upper body clothing?: A Little  -   Taking care of personal grooming such as brushing teeth?: None  -   Eating meals?: None    AM-PAC Score:  Score: 20  Approx Degree of Impairment: 38.32%  Standardized Score (AM-PAC Scale): 42.03    ADDITIONAL TESTS     NEUROLOGICAL FINDINGS      COGNITION ASSESSMENTS     PLAN  OT Device Recommendations: TBD  OT Treatment Plan: Balance activities;Energy conservation/work simplification techniques;ADL training;IADL training;Functional transfer training;UE strengthening/ROM;Endurance training;Patient/Family education;Patient/Family training;Equipment eval/education;Compensatory technique education  Rehab Potential : Good  Frequency: 3-5x/week  Number of Visits to Meet Established Goals: 2    ADL Goals   Patient will perform lower body dressing:  with setup  Patient will perform toileting: with stand by assist    Functional Transfer Goals  Patient will transfer from sit to supine:  with stand by assist  Patient will transfer from supine to sit:  with stand by assist  Patient will transfer from sit to stand:  with stand by assist  Patient will  transfer to toilet:  with supervision    UE Exercise Program Goal  Patient will be supervision with bilateral AROM HEP (home exercise program) per CV binder.     Additional Goals  Pt will verbalize 2 energy conservation strategies to be implemented into home routine    Patient Evaluation Complexity Level:   Occupational Profile/Medical History LOW - Brief history including review of medical or therapy records    Specific performance deficits impacting engagement in ADL/IADL MODERATE  3 - 5 performance deficits   Client Assessment/Performance Deficits MODERATE - Comorbidities and min to mod modifications of tasks    Clinical Decision Making MODERATE - Analysis of occupational profile, detailed assessments, several treatment options    Overall Complexity LOW     OT Session Time: 25 minutes  Therapeutic Activity: 10 minutes

## 2025-01-19 LAB
ANION GAP SERPL CALC-SCNC: 7 MMOL/L (ref 0–18)
BUN BLD-MCNC: 23 MG/DL (ref 9–23)
CALCIUM BLD-MCNC: 8.6 MG/DL (ref 8.7–10.6)
CHLORIDE SERPL-SCNC: 106 MMOL/L (ref 98–112)
CO2 SERPL-SCNC: 28 MMOL/L (ref 21–32)
CREAT BLD-MCNC: 0.86 MG/DL
EGFRCR SERPLBLD CKD-EPI 2021: 95 ML/MIN/1.73M2 (ref 60–?)
ERYTHROCYTE [DISTWIDTH] IN BLOOD BY AUTOMATED COUNT: 13.6 %
GLUCOSE BLD-MCNC: 114 MG/DL (ref 70–99)
GLUCOSE BLD-MCNC: 124 MG/DL (ref 70–99)
GLUCOSE BLD-MCNC: 127 MG/DL (ref 70–99)
GLUCOSE BLD-MCNC: 131 MG/DL (ref 70–99)
GLUCOSE BLD-MCNC: 152 MG/DL (ref 70–99)
HCT VFR BLD AUTO: 25.1 %
HGB BLD-MCNC: 8.7 G/DL
MCH RBC QN AUTO: 31.5 PG (ref 26–34)
MCHC RBC AUTO-ENTMCNC: 34.7 G/DL (ref 31–37)
MCV RBC AUTO: 90.9 FL
OSMOLALITY SERPL CALC.SUM OF ELEC: 297 MOSM/KG (ref 275–295)
PLATELET # BLD AUTO: 156 10(3)UL (ref 150–450)
POTASSIUM SERPL-SCNC: 3.6 MMOL/L (ref 3.5–5.1)
POTASSIUM SERPL-SCNC: 3.9 MMOL/L (ref 3.5–5.1)
RBC # BLD AUTO: 2.76 X10(6)UL
SODIUM SERPL-SCNC: 141 MMOL/L (ref 136–145)
WBC # BLD AUTO: 14.5 X10(3) UL (ref 4–11)

## 2025-01-19 RX ORDER — DOXEPIN HYDROCHLORIDE 50 MG/1
1 CAPSULE ORAL DAILY
Status: DISCONTINUED | OUTPATIENT
Start: 2025-01-20 | End: 2025-01-22

## 2025-01-19 RX ORDER — POTASSIUM CHLORIDE 1500 MG/1
40 TABLET, EXTENDED RELEASE ORAL EVERY 4 HOURS
Status: COMPLETED | OUTPATIENT
Start: 2025-01-19 | End: 2025-01-19

## 2025-01-19 RX ADMIN — GUAIFENESIN 1200 MG: 600 TABLET, EXTENDED RELEASE ORAL at 21:50:00

## 2025-01-19 RX ADMIN — POTASSIUM CHLORIDE 40 MEQ: 1500 TABLET, EXTENDED RELEASE ORAL at 08:48:00

## 2025-01-19 RX ADMIN — DOCUSATE SODIUM 100 MG: 100 CAPSULE, LIQUID FILLED ORAL at 21:50:00

## 2025-01-19 RX ADMIN — DOCUSATE SODIUM 100 MG: 100 CAPSULE, LIQUID FILLED ORAL at 08:49:00

## 2025-01-19 RX ADMIN — GUAIFENESIN 1200 MG: 600 TABLET, EXTENDED RELEASE ORAL at 08:48:00

## 2025-01-19 RX ADMIN — CETIRIZINE HYDROCHLORIDE 10 MG: 10 TABLET ORAL at 08:50:00

## 2025-01-19 RX ADMIN — PANTOPRAZOLE SODIUM 40 MG: 40 TABLET, DELAYED RELEASE ORAL at 05:35:00

## 2025-01-19 RX ADMIN — FUROSEMIDE 40 MG: 10 INJECTION INTRAMUSCULAR; INTRAVENOUS at 17:44:00

## 2025-01-19 RX ADMIN — SENNOSIDES 8.6 MG: 8.6 MG TABLET ORAL at 21:50:00

## 2025-01-19 RX ADMIN — FUROSEMIDE 40 MG: 10 INJECTION INTRAMUSCULAR; INTRAVENOUS at 08:45:00

## 2025-01-19 RX ADMIN — POTASSIUM CHLORIDE 40 MEQ: 1500 TABLET, EXTENDED RELEASE ORAL at 13:19:00

## 2025-01-19 RX ADMIN — ASPIRIN 81 MG: 81 TABLET, CHEWABLE ORAL at 08:50:00

## 2025-01-19 RX ADMIN — SENNOSIDES 8.6 MG: 8.6 MG TABLET ORAL at 08:49:00

## 2025-01-19 RX ADMIN — ROSUVASTATIN CALCIUM 40 MG: 20 TABLET, COATED ORAL at 21:50:00

## 2025-01-19 NOTE — PLAN OF CARE
Assumed care of patient from [facility] RN. Patient S/p POD 3. Patient declines pain, RN educated on patient if pain occurs to let RN to ensure adequate breathing. Patient is sinus rhythm on room air, encouraging IS and Flutter valve. Fall precautions in place, call light and belongings. Patient ambulating in halls without difficulty, voiding without difficulty. BM this morning.       Problem: Patient/Family Goals  Goal: Patient/Family Long Term Goal  Description: Patient's Long Term Goal: Stay Healthy    Interventions:  - Resume home med regimen  -Follow up with providers  - See additional Care Plan goals for specific interventions  Outcome: Not Progressing  Goal: Patient/Family Short Term Goal  Description: Patient's Short Term Goal: Discharge from hospital    Interventions:   - CABG 1/16  - See additional Care Plan goals for specific interventions  Outcome: Not Progressing     Problem: CARDIOVASCULAR - ADULT  Goal: Maintains optimal cardiac output and hemodynamic stability  Description: INTERVENTIONS:  - Monitor vital signs, rhythm, and trends  - Monitor for bleeding, hypotension and signs of decreased cardiac output  - Evaluate effectiveness of vasoactive medications to optimize hemodynamic stability  - Monitor arterial and/or venous puncture sites for bleeding and/or hematoma  - Assess quality of pulses, skin color and temperature  - Assess for signs of decreased coronary artery perfusion - ex. Angina  - Evaluate fluid balance, assess for edema, trend weights  Outcome: Not Progressing  Goal: Absence of cardiac arrhythmias or at baseline  Description: INTERVENTIONS:  - Continuous cardiac monitoring, monitor vital signs, obtain 12 lead EKG if indicated  - Evaluate effectiveness of antiarrhythmic and heart rate control medications as ordered  - Initiate emergency measures for life threatening arrhythmias  - Monitor electrolytes and administer replacement therapy as ordered  Outcome: Not Progressing     Problem:  RESPIRATORY - ADULT  Goal: Achieves optimal ventilation and oxygenation  Description: INTERVENTIONS:  - Assess for changes in respiratory status  - Assess for changes in mentation and behavior  - Position to facilitate oxygenation and minimize respiratory effort  - Oxygen supplementation based on oxygen saturation or ABGs  - Provide Smoking Cessation handout, if applicable  - Encourage broncho-pulmonary hygiene including cough, deep breathe, Incentive Spirometry  - Assess the need for suctioning and perform as needed  - Assess and instruct to report SOB or any respiratory difficulty  - Respiratory Therapy support as indicated  - Manage/alleviate anxiety  - Monitor for signs/symptoms of CO2 retention  Outcome: Not Progressing     Problem: Diabetes/Glucose Control  Goal: Glucose maintained within prescribed range  Description: INTERVENTIONS:  - Monitor Blood Glucose as ordered  - Assess for signs and symptoms of hyperglycemia and hypoglycemia  - Administer ordered medications to maintain glucose within target range  - Assess barriers to adequate nutritional intake and initiate nutrition consult as needed  - Instruct patient on self management of diabetes  Outcome: Not Progressing

## 2025-01-19 NOTE — PROGRESS NOTES
[facility] Hospitalist Progress Note     CC: Hospital Follow up    PCP: Parker Clancy MD       Subjective:     Patient seen and examined.  No complaints.  Denies CP/SOB.  NAD.     OBJECTIVE:    Blood pressure 131/68, pulse 78, temperature 98.9 °F (37.2 °C), temperature source Oral, resp. rate 22, height 5' 10\" (1.778 m), weight 222 lb 3.6 oz (100.8 kg), SpO2 93%.    Temp:  [98.2 °F (36.8 °C)-98.9 °F (37.2 °C)] 98.9 °F (37.2 °C)  Pulse:  [] 78  Resp:  [17-33] 22  BP: (104-161)/(55-70) 131/68  SpO2:  [88 %-97 %] 93 %      Intake/Output:    Intake/Output Summary (Last 24 hours) at 1/19/2025 1004  Last data filed at 1/19/2025 0900  Gross per 24 hour   Intake 600 ml   Output 2350 ml   Net -1750 ml       Last 3 Weights   01/19/25 0532 222 lb 3.6 oz (100.8 kg)   01/18/25 0600 228 lb 9.6 oz (103.7 kg)   01/17/25 0500 227 lb 8.2 oz (103.2 kg)   01/16/25 0530 225 lb 1.6 oz (102.1 kg)   01/15/25 1743 225 lb (102.1 kg)   01/13/25 0842 227 lb (103 kg)   06/16/22 1207 222 lb (100.7 kg)       Exam   Gen: Alert, no acute distress  Heent: Normocephalic, atraumatic, neck supple, EOMI, PERRLA  Pulm: Lungs CTAB, normal respiratory effort  CV:  Regular rate and rhythm, no murmurs/rubs/gallops  Abd: Soft, nontender, nondistended, bowel sounds present  Extremities: No peripheral edema, no clubbing, pulses intact   Skin: No rashes or lesions  Neuro: AOx3, no focal neurologic deficits, CN II-XII grossly intact  Psych: appropriate mood and affect       Data Review:       Labs:     Recent Labs   Lab 01/17/25  0319 01/18/25  0340 01/19/25  0440   RBC 3.18* 2.93* 2.76*   HGB 9.9* 9.4* 8.7*   HCT 28.1* 26.7* 25.1*   MCV 88.4 91.1 90.9   MCH 31.1 32.1 31.5   MCHC 35.2 35.2 34.7   RDW 12.9 13.9 13.6   NEPRELIM 12.44*  --   --    WBC 13.8* 20.6* 14.5*   .0 174.0 156.0         Recent Labs   Lab 01/17/25  0319 01/18/25  0340 01/19/25  0440   *  117* 153*  153* 124*   BUN 15  15 26*  26* 23   CREATSERUM 0.92   0.92 1.16  1.16 0.86   EGFRCR 92  92 69  69 95   CA 8.4*  8.4* 8.7  8.7 8.6*     144 140  140 141   K 4.1  4.1 4.2  4.2 3.6     111 105  105 106   CO2 24.0  24.0 27.0  27.0 28.0       Recent Labs   Lab 01/15/25  1930 01/16/25  1010 01/17/25  0319 01/18/25  0340   ALT 35  --   --   --    AST 34*  --   --   --    ALB 4.4 4.6 3.5 4.0         Imaging:  XR CHEST AP PORTABLE  (CPT=71045)    Result Date: 1/17/2025  CONCLUSION:  Interval removal of lines and tubes.  No acute cardiopulmonary disease.   LOCATION:  [facility]      Dictated by (CST): Lisa Rowley DO on 1/17/2025 at 3:33 PM     Finalized by (CST): Lisa Rowley DO on 1/17/2025 at 3:34 PM       XR CHEST AP PORTABLE  (CPT=71045)    Result Date: 1/17/2025  CONCLUSION:  No significant interval change.   LOCATION:  [id number]      Dictated by (CST): Evan Shah MD on 1/17/2025 at 7:02 AM     Finalized by (CST): Evan Shah MD on 1/17/2025 at 7:03 AM       XR CHEST AP PORTABLE  (CPT=71045)    Result Date: 1/16/2025  CONCLUSION:     1. Endotracheal tube 4.6 cm above the chronic, nasogastric tube present tip in the proximal stomach.  Left chest tube, mediastinal drain.  [place] catheter tip in the right main pulmonary outflow.  2. Mild vascular congestion without overt CHF.  Mild basilar atelectasis.  No sign of pneumothorax.   LOCATION:  [facility]      Dictated by (CST): Anthony Garcia MD on 1/16/2025 at 6:52 PM     Finalized by (CST): Anthony Garcia MD on 1/16/2025 at 6:53 PM          Meds:      potassium chloride  40 mEq Oral Q4H    guaiFENesin ER  1,200 mg Oral BID    furosemide  40 mg Intravenous BID (Diuretic)    insulin aspart  1-5 Units Subcutaneous TID AC and HS    rosuvastatin  40 mg Oral Nightly    aspirin  81 mg Oral Daily    sennosides  8.6 mg Oral BID    docusate sodium  100 mg Oral BID    metoprolol tartrate  12.5 mg Oral 2x Daily(Beta Blocker)    mupirocin  1 Application Nasal BID    pantoprazole  40 mg Intravenous QAM AC    Or     pantoprazole  40 mg Oral QAM AC    cetirizine  10 mg Oral Daily      sodium chloride 10 mL/hr at 01/16/25 1735       acetaminophen    HYDROcodone-acetaminophen **OR** HYDROcodone-acetaminophen    ipratropium-albuterol    melatonin    polyethylene glycol (PEG 3350)    bisacodyl    ondansetron    morphINE **OR** morphINE    glucose **OR** glucose **OR** glucose-vitamin C **OR** dextrose **OR** glucose **OR** glucose **OR** glucose-vitamin C       Assessment/Plan:     64 year old male with a history of hyperlipidemia and CAD who underwent cardiac cath on 1/15 with Dr. Grewal showing critical ostial left main stenosis with good distal targets for bypass. Underwent successful bypass 1/16.      CAD with severe ostial left main stenosis s/p 2V CABG - LIMA LAD SVG OM (1/16/25)   -Tolerated procedure well  -Management per CT surgery, cardiology  -ASA, statin  -metoprolol started    -One time dose lasix, prn per cardiology- monitor I's/O's   -Chest tubes removed 1/17  -Off insulin ggt, transitioned to sliding scale for sugars - glucose levels stable  -PT/OT/cardiac rehab   -Monitor RFP - creatinine stable today      Hyperlipidemia  -Statin     Dispo: Monitoring in cardiac ICU.     Sarah Weiss MD  [facility]  Hospitalist  Contact via [employer]    Supplementary Documentation:   DVT Mechanical Prophylaxis: GLORIA hose,      DVT Pharmacologic Prophylaxis   Medication   None      DVT Pharmacologic prophylaxis: Aspirin 162 mg         Code Status: Not on file  Corbett: No urinary catheter in place  Corbett Duration (in days): 1  Central line:    TAN:

## 2025-01-19 NOTE — PLAN OF CARE
Up in hallways, up in chair. IS 1000, encourage IS and [place]apella. Pacer wires on, capped. Vitals stable. 1L oxygen with sleep, otherwise room air during day. Incisions clean and dry, needs met. Kept clean and dry.     Problem: CARDIOVASCULAR - ADULT  Goal: Maintains optimal cardiac output and hemodynamic stability  Description: INTERVENTIONS:  - Monitor vital signs, rhythm, and trends  - Monitor for bleeding, hypotension and signs of decreased cardiac output  - Evaluate effectiveness of vasoactive medications to optimize hemodynamic stability  - Monitor arterial and/or venous puncture sites for bleeding and/or hematoma  - Assess quality of pulses, skin color and temperature  - Assess for signs of decreased coronary artery perfusion - ex. Angina  - Evaluate fluid balance, assess for edema, trend weights  Outcome: Progressing  Goal: Absence of cardiac arrhythmias or at baseline  Description: INTERVENTIONS:  - Continuous cardiac monitoring, monitor vital signs, obtain 12 lead EKG if indicated  - Evaluate effectiveness of antiarrhythmic and heart rate control medications as ordered  - Initiate emergency measures for life threatening arrhythmias  - Monitor electrolytes and administer replacement therapy as ordered  Outcome: Progressing     Problem: RESPIRATORY - ADULT  Goal: Achieves optimal ventilation and oxygenation  Description: INTERVENTIONS:  - Assess for changes in respiratory status  - Assess for changes in mentation and behavior  - Position to facilitate oxygenation and minimize respiratory effort  - Oxygen supplementation based on oxygen saturation or ABGs  - Provide Smoking Cessation handout, if applicable  - Encourage broncho-pulmonary hygiene including cough, deep breathe, Incentive Spirometry  - Assess the need for suctioning and perform as needed  - Assess and instruct to report SOB or any respiratory difficulty  - Respiratory Therapy support as indicated  - Manage/alleviate anxiety  - Monitor for  signs/symptoms of CO2 retention  Outcome: Progressing     Problem: Diabetes/Glucose Control  Goal: Glucose maintained within prescribed range  Description: INTERVENTIONS:  - Monitor Blood Glucose as ordered  - Assess for signs and symptoms of hyperglycemia and hypoglycemia  - Administer ordered medications to maintain glucose within target range  - Assess barriers to adequate nutritional intake and initiate nutrition consult as needed  - Instruct patient on self management of diabetes  Outcome: Progressing

## 2025-01-19 NOTE — DIETARY NOTE
[facility]   part of [facility]   CLINICAL NUTRITION    Juan Pablo Lam admitted on 1/15 presents for cardiac cath which showed critical ostial L main stenosis. Pt s/p CABG x2 1/16.    PMH:  has a past medical history of Coronary atherosclerosis and High cholesterol.    Admitting diagnosis:  Abnormal stress test [R94.39]    Ht: 177.8 cm (5' 10\")  Wt: 100.8 kg (222 lb 3.6 oz).   Body mass index is 31.89 kg/m².    Wt Readings from Last 6 Encounters:   01/19/25 100.8 kg (222 lb 3.6 oz)   06/16/22 100.7 kg (222 lb)        Labs/Meds reviewed    Diet:       Procedures    Regular/General diet Is Patient on Accuchecks? Yes; Misc Restriction: Cardiac       Percent Meals Eaten (last 3 days)       Date/Time Percent Meals Eaten (%)    01/18/25 0914 20 %    01/18/25 1952 50 %    01/19/25 0100 0 %    01/19/25 0900 75 %            Pt chart reviewed d/t consult for \"poor intake post CABG.  Consult to eval and write for nutrional supplements as needed\". Visited pt at bedside with wife present. Pt reports appetite at baseline prior to this was good but reports decreased appetite since his surgery. Does report slowly improving daily. Denies GI symptoms at this time with last BM today. No chewing or swallowing difficulties and NFKA. Reports his wt was stable ~226 lbs PTA with no significant changes. Ensure ordered for pt yesterday; pt prefers chocolate BID - will update order.  Provided Heart Healthy Nutrition Therapy handout and reviewed heart healthy fats, limiting sodium, and increasing physical activity. All questions answered at this time.    Patient is at low nutrition risk at this time.    Please consult if patient status changes or nutrition issues arise.    Sachi Ambrose, RD, LDN, [facility]  Clinical Dietitian  Spectra: [phone]

## 2025-01-20 LAB
ANION GAP SERPL CALC-SCNC: 8 MMOL/L (ref 0–18)
BUN BLD-MCNC: 27 MG/DL (ref 9–23)
CALCIUM BLD-MCNC: 9.1 MG/DL (ref 8.7–10.6)
CHLORIDE SERPL-SCNC: 106 MMOL/L (ref 98–112)
CO2 SERPL-SCNC: 27 MMOL/L (ref 21–32)
CREAT BLD-MCNC: 0.93 MG/DL
EGFRCR SERPLBLD CKD-EPI 2021: 91 ML/MIN/1.73M2 (ref 60–?)
ERYTHROCYTE [DISTWIDTH] IN BLOOD BY AUTOMATED COUNT: 13.5 %
GLUCOSE BLD-MCNC: 116 MG/DL (ref 70–99)
GLUCOSE BLD-MCNC: 128 MG/DL (ref 70–99)
GLUCOSE BLD-MCNC: 141 MG/DL (ref 70–99)
GLUCOSE BLD-MCNC: 153 MG/DL (ref 70–99)
GLUCOSE BLD-MCNC: 96 MG/DL (ref 70–99)
HCT VFR BLD AUTO: 28.7 %
HGB BLD-MCNC: 9.9 G/DL
MCH RBC QN AUTO: 31.6 PG (ref 26–34)
MCHC RBC AUTO-ENTMCNC: 34.5 G/DL (ref 31–37)
MCV RBC AUTO: 91.7 FL
OSMOLALITY SERPL CALC.SUM OF ELEC: 298 MOSM/KG (ref 275–295)
PLATELET # BLD AUTO: 211 10(3)UL (ref 150–450)
POTASSIUM SERPL-SCNC: 3.5 MMOL/L (ref 3.5–5.1)
POTASSIUM SERPL-SCNC: 4.1 MMOL/L (ref 3.5–5.1)
RBC # BLD AUTO: 3.13 X10(6)UL
SODIUM SERPL-SCNC: 141 MMOL/L (ref 136–145)
WBC # BLD AUTO: 11.4 X10(3) UL (ref 4–11)

## 2025-01-20 RX ORDER — POTASSIUM CHLORIDE 1500 MG/1
40 TABLET, EXTENDED RELEASE ORAL EVERY 4 HOURS
Status: COMPLETED | OUTPATIENT
Start: 2025-01-20 | End: 2025-01-20

## 2025-01-20 RX ORDER — METOPROLOL SUCCINATE 50 MG/1
50 TABLET, EXTENDED RELEASE ORAL
Status: DISCONTINUED | OUTPATIENT
Start: 2025-01-20 | End: 2025-01-22

## 2025-01-20 RX ORDER — HYDROCODONE BITARTRATE AND ACETAMINOPHEN 5; 325 MG/1; MG/1
1 TABLET ORAL EVERY 6 HOURS PRN
Qty: 20 TABLET | Refills: 0 | Status: SHIPPED | OUTPATIENT
Start: 2025-01-20

## 2025-01-20 RX ORDER — CALCIUM CARBONATE 500 MG/1
500 TABLET, CHEWABLE ORAL 3 TIMES DAILY PRN
Status: DISCONTINUED | OUTPATIENT
Start: 2025-01-20 | End: 2025-01-22

## 2025-01-20 RX ORDER — FUROSEMIDE 10 MG/ML
40 INJECTION INTRAMUSCULAR; INTRAVENOUS
Status: COMPLETED | OUTPATIENT
Start: 2025-01-20 | End: 2025-01-21

## 2025-01-20 RX ADMIN — CALCIUM CARBONATE 500 MG: 500 TABLET, CHEWABLE ORAL at 18:11:00

## 2025-01-20 RX ADMIN — ROSUVASTATIN CALCIUM 40 MG: 20 TABLET, COATED ORAL at 20:59:00

## 2025-01-20 RX ADMIN — FUROSEMIDE 40 MG: 10 INJECTION INTRAMUSCULAR; INTRAVENOUS at 17:04:00

## 2025-01-20 RX ADMIN — PANTOPRAZOLE SODIUM 40 MG: 40 TABLET, DELAYED RELEASE ORAL at 05:27:00

## 2025-01-20 RX ADMIN — FUROSEMIDE 40 MG: 10 INJECTION INTRAMUSCULAR; INTRAVENOUS at 08:54:00

## 2025-01-20 RX ADMIN — POTASSIUM CHLORIDE 40 MEQ: 1500 TABLET, EXTENDED RELEASE ORAL at 17:04:00

## 2025-01-20 RX ADMIN — METOPROLOL SUCCINATE 50 MG: 50 TABLET, EXTENDED RELEASE ORAL at 11:55:00

## 2025-01-20 RX ADMIN — POTASSIUM CHLORIDE 40 MEQ: 1500 TABLET, EXTENDED RELEASE ORAL at 11:55:00

## 2025-01-20 RX ADMIN — GUAIFENESIN 1200 MG: 600 TABLET, EXTENDED RELEASE ORAL at 20:59:00

## 2025-01-20 RX ADMIN — ASPIRIN 81 MG: 81 TABLET, CHEWABLE ORAL at 08:54:00

## 2025-01-20 RX ADMIN — CETIRIZINE HYDROCHLORIDE 10 MG: 10 TABLET ORAL at 08:54:00

## 2025-01-20 RX ADMIN — DOCUSATE SODIUM 100 MG: 100 CAPSULE, LIQUID FILLED ORAL at 21:02:00

## 2025-01-20 RX ADMIN — DOXEPIN HYDROCHLORIDE 1 TABLET: 50 CAPSULE ORAL at 08:54:00

## 2025-01-20 RX ADMIN — GUAIFENESIN 1200 MG: 600 TABLET, EXTENDED RELEASE ORAL at 08:54:00

## 2025-01-20 RX ADMIN — SENNOSIDES 8.6 MG: 8.6 MG TABLET ORAL at 21:00:00

## 2025-01-20 NOTE — PHYSICAL THERAPY NOTE
PHYSICAL THERAPY TREATMENT NOTE - INPATIENT    Room Number: 8610/8610-A     Session: 1     Number of Visits to Meet Established Goals: 3    Presenting Problem: s/p coronary revascularization x2, L internal mammary artery graft to the L anterior descending and saphenous vein graft to the obtuse marginal 1/16  Co-Morbidities : HL, CAD    PHYSICAL THERAPY ASSESSMENT   Patient demonstrates good  progress this session, goals  updated to reflect patient performance.      Patient is requiring stand-by assist as a result of the following impairments: decreased endurance/aerobic capacity and left wrist weakness .     Patient continues to function near baseline with transfers and gait.  Next session anticipate patient to progress bed mobility, transfers, and gait.  Physical Therapy will continue to follow patient for duration of hospitalization.    Patient continues to benefit from continued skilled PT services: at discharge to promote prior level of function and safety with additional support and return home with home health PT.    PLAN DURING HOSPITALIZATION  Nursing Mobility Recommendation : 1 Assist  PT Device Recommendation: Rolling walker;Gait belt  PT Treatment Plan: Bed mobility;Body mechanics;Endurance;Energy conservation;Patient education;Family education;Gait training;Strengthening;Stair training;Transfer training  Frequency (Obs): 3-5x/week     CURRENT GOALS     Goal #1 Patient is able to demonstrate supine - sit EOB @ level: independent       Goal #2 Patient is able to demonstrate transfers Sit to/from Stand at assistance level: modified independent  Met    Goal #3 Patient is able to ambulate 150 feet with assist device: walker - rolling at assistance level: modified independent  Ongoing for assist level   Goal #4 Patient will ascend/descend 12 steps with supervision and handrail.   Met    Goal #5    Goal #6    Goal Comments: Goals established on 1/18/2025 1/20/2025 all goals ongoing.    SUBJECTIVE  \" My hand  is the only thing that is not working.\"    OBJECTIVE  Precautions: Sternal;Cardiac    WEIGHT BEARING RESTRICTION     PAIN ASSESSMENT   Ratin  Location: denied  Management Techniques: Activity promotion;Breathing techniques;Repositioning    BALANCE                                                                                                                       Static Sitting: Good  Dynamic Sitting: Good           Static Standing: Fair +  Dynamic Standing: Fair +    ACTIVITY TOLERANCE                         O2 WALK       AM-PAC '6-Clicks' INPATIENT SHORT FORM - BASIC MOBILITY  How much difficulty does the patient currently have...  Patient Difficulty: Turning over in bed (including adjusting bedclothes, sheets and blankets)?: None   Patient Difficulty: Sitting down on and standing up from a chair with arms (e.g., wheelchair, bedside commode, etc.): None   Patient Difficulty: Moving from lying on back to sitting on the side of the bed?: A Little   How much help from another person does the patient currently need...   Help from Another: Moving to and from a bed to a chair (including a wheelchair)?: None   Help from Another: Need to walk in hospital room?: None   Help from Another: Climbing 3-5 steps with a railing?: A Little     AM-PAC Score:  Raw Score: 22   Approx Degree of Impairment: 20.91%   Standardized Score (AM-PAC Scale): 53.28   CMS Modifier (G-Code): CJ    FUNCTIONAL ABILITY STATUS  Gait Assessment   Functional Mobility/Gait Assessment  Gait Assistance: Supervision  Distance (ft): 250  Assistive Device: Rolling walker  Pattern: Within Functional Limits  Stairs: Stairs  How Many Stairs: 11  Device: 1 Rail  Assist: Contact guard assist    Skilled Therapy Provided    Bed Mobility: Training provided on log roll.   Rolling: Sup with cues   Supine<>Sit: sup with cues for technique.    Sit<>Supine: sup with cues.     Transfer Mobility:  Sit<>Stand: mod ind   Stand<>Sit: mod ind   Gait: mod ind in the room.  Sup for longer distance. Slow hoang, jeyson le slightly ext rotated placement, proper step through pattern and no lob noted.     Therapist's Comments: longer time required, wife concerned about pt's hand and wrist weakness. Exercises performed per binder. Needs cues to incorporate left shoulder muscles during shoulder shrugs and scapula retraction. Manual guidance needed.   Additional ROM given to the patient for L hand and digits in gravity eliminated position.   Neuro consulted, per RN.   Patient was instructed on exercises per cardiac binder; proper body mechanics and log roll for bed mobility; proper sit to stand technique with reinforcement of LE force generation; sternal precautions; energy conservation techniques; and activity guidelines.         Exercises Repetitions   UPPER EXTREMITY     Head turns  10   Hand pumps  10        Bicep Curls - Alternating  10   Shoulder Flexion > 90 degrees  10              LOWER EXTREMITY     Ankle pumps 10       Heel Raises - bilateral 10   Partial Arc Quad - alternating 10   Seated Marching 10             CHEST EXERCISES     Trunk Rotation 10   Elbow Circles 10   Chest Fly's  10   Scapular Retraction 10            Patient End of Session: Up in chair;Needs met;Call light within reach;RN aware of session/findings;Family present    PT Session Time: 45 minutes  Gait Training: 15 minutes  Therapeutic Activity: 10 minutes  Therapeutic Exercise: 20 minutes   Neuromuscular Re-education: 0 minutes

## 2025-01-20 NOTE — CM/SW NOTE
Notified [facility] liaison that OT has been ordered for pt also.     Latasha MORENO, LSW  Discharge Planner

## 2025-01-20 NOTE — CARDIAC REHAB
Cardiac rehab education completed with patient and wife  Discharge video shown  Patient referred to cardiac rehab  Appointment made

## 2025-01-20 NOTE — PLAN OF CARE
Patient received in chair. Alert and oriented. Denies pain. Surgical incision clean and dry, cleansed with betadine. Had a bowel movement today. Up in hallways. Encouraging incentive spirometer and accapella. Less coughing today per patient.  Strict intake and output. Daily weight. SR on tele.  Room air with SPO2> 95%. Good urine output. Needs met. Kept clean and dry. Endorse pt to incoming NOC shift nurse.     Problem: CARDIOVASCULAR - ADULT  Goal: Maintains optimal cardiac output and hemodynamic stability  Description: INTERVENTIONS:  - Monitor vital signs, rhythm, and trends  - Monitor for bleeding, hypotension and signs of decreased cardiac output  - Evaluate effectiveness of vasoactive medications to optimize hemodynamic stability  - Monitor arterial and/or venous puncture sites for bleeding and/or hematoma  - Assess quality of pulses, skin color and temperature  - Assess for signs of decreased coronary artery perfusion - ex. Angina  - Evaluate fluid balance, assess for edema, trend weights  Outcome: Progressing  Goal: Absence of cardiac arrhythmias or at baseline  Description: INTERVENTIONS:  - Continuous cardiac monitoring, monitor vital signs, obtain 12 lead EKG if indicated  - Evaluate effectiveness of antiarrhythmic and heart rate control medications as ordered  - Initiate emergency measures for life threatening arrhythmias  - Monitor electrolytes and administer replacement therapy as ordered  Outcome: Progressing     Problem: RESPIRATORY - ADULT  Goal: Achieves optimal ventilation and oxygenation  Description: INTERVENTIONS:  - Assess for changes in respiratory status  - Assess for changes in mentation and behavior  - Position to facilitate oxygenation and minimize respiratory effort  - Oxygen supplementation based on oxygen saturation or ABGs  - Provide Smoking Cessation handout, if applicable  - Encourage broncho-pulmonary hygiene including cough, deep breathe, Incentive Spirometry  - Assess the need  for suctioning and perform as needed  - Assess and instruct to report SOB or any respiratory difficulty  - Respiratory Therapy support as indicated  - Manage/alleviate anxiety  - Monitor for signs/symptoms of CO2 retention  Outcome: Progressing     Problem: Diabetes/Glucose Control  Goal: Glucose maintained within prescribed range  Description: INTERVENTIONS:  - Monitor Blood Glucose as ordered  - Assess for signs and symptoms of hyperglycemia and hypoglycemia  - Administer ordered medications to maintain glucose within target range  - Assess barriers to adequate nutritional intake and initiate nutrition consult as needed  - Instruct patient on self management of diabetes  Outcome: Progressing

## 2025-01-20 NOTE — PLAN OF CARE
Assumed care of patient at change of shift.   Alert and oriented x4.   Sinus rhythm on tele.   Room air, lung sounds diminished bilaterally, no cough noted. Continuous pulse oximetry maintained. Denies shortness of breath and dizziness.   Complaints of mild pain to sternal incision. Declines pain meds. Left wrist drop, MD's aware. Neuro consulted.   Continent of bowel and bladder. Complaints of indigestion, PRN antacid given.  Glasses present at bedside.  Safety precautions maintained.    12:30 Pacer wires removed.    Discussed plan of care with patient. All questions answered.    Problem: Patient/Family Goals  Goal: Patient/Family Long Term Goal  Description: Patient's Long Term Goal: Stay Healthy    Interventions:  - Resume home med regimen  -Follow up with providers  - See additional Care Plan goals for specific interventions  Outcome: Progressing  Goal: Patient/Family Short Term Goal  Description: Patient's Short Term Goal: Discharge from hospital    Interventions:   - CABG 1/16  - See additional Care Plan goals for specific interventions  Outcome: Progressing     Problem: CARDIOVASCULAR - ADULT  Goal: Maintains optimal cardiac output and hemodynamic stability  Description: INTERVENTIONS:  - Monitor vital signs, rhythm, and trends  - Monitor for bleeding, hypotension and signs of decreased cardiac output  - Evaluate effectiveness of vasoactive medications to optimize hemodynamic stability  - Monitor arterial and/or venous puncture sites for bleeding and/or hematoma  - Assess quality of pulses, skin color and temperature  - Assess for signs of decreased coronary artery perfusion - ex. Angina  - Evaluate fluid balance, assess for edema, trend weights  Outcome: Progressing  Goal: Absence of cardiac arrhythmias or at baseline  Description: INTERVENTIONS:  - Continuous cardiac monitoring, monitor vital signs, obtain 12 lead EKG if indicated  - Evaluate effectiveness of antiarrhythmic and heart rate control  medications as ordered  - Initiate emergency measures for life threatening arrhythmias  - Monitor electrolytes and administer replacement therapy as ordered  Outcome: Progressing     Problem: RESPIRATORY - ADULT  Goal: Achieves optimal ventilation and oxygenation  Description: INTERVENTIONS:  - Assess for changes in respiratory status  - Assess for changes in mentation and behavior  - Position to facilitate oxygenation and minimize respiratory effort  - Oxygen supplementation based on oxygen saturation or ABGs  - Provide Smoking Cessation handout, if applicable  - Encourage broncho-pulmonary hygiene including cough, deep breathe, Incentive Spirometry  - Assess the need for suctioning and perform as needed  - Assess and instruct to report SOB or any respiratory difficulty  - Respiratory Therapy support as indicated  - Manage/alleviate anxiety  - Monitor for signs/symptoms of CO2 retention  Outcome: Progressing     Problem: Diabetes/Glucose Control  Goal: Glucose maintained within prescribed range  Description: INTERVENTIONS:  - Monitor Blood Glucose as ordered  - Assess for signs and symptoms of hyperglycemia and hypoglycemia  - Administer ordered medications to maintain glucose within target range  - Assess barriers to adequate nutritional intake and initiate nutrition consult as needed  - Instruct patient on self management of diabetes  Outcome: Progressing

## 2025-01-20 NOTE — PROGRESS NOTES
[facility] Hospitalist Progress Note     CC: Hospital Follow up    PCP: Parker Clancy MD       Subjective:     Patient seen and examined.  No complaints.  Denies CP/SOB.  NAD.     OBJECTIVE:    Blood pressure 145/63, pulse 82, temperature 98.5 °F (36.9 °C), temperature source Oral, resp. rate 19, height 5' 10\" (1.778 m), weight 217 lb 6 oz (98.6 kg), SpO2 98%.    Temp:  [98.1 °F (36.7 °C)-99.1 °F (37.3 °C)] 98.5 °F (36.9 °C)  Pulse:  [81-99] 82  Resp:  [18-20] 19  BP: (132-147)/(60-77) 145/63  SpO2:  [92 %-98 %] 98 %      Intake/Output:    Intake/Output Summary (Last 24 hours) at 1/20/2025 1009  Last data filed at 1/20/2025 0919  Gross per 24 hour   Intake 2410 ml   Output 3300 ml   Net -890 ml       Last 3 Weights   01/20/25 0525 217 lb 6 oz (98.6 kg)   01/19/25 0532 222 lb 3.6 oz (100.8 kg)   01/18/25 0600 228 lb 9.6 oz (103.7 kg)   01/17/25 0500 227 lb 8.2 oz (103.2 kg)   01/16/25 0530 225 lb 1.6 oz (102.1 kg)   01/15/25 1743 225 lb (102.1 kg)   01/13/25 0842 227 lb (103 kg)   06/16/22 1207 222 lb (100.7 kg)       Exam   Gen: Alert, no acute distress  Heent: Normocephalic, atraumatic, neck supple, EOMI, PERRLA  Pulm: Lungs CTAB, normal respiratory effort  CV:  Regular rate and rhythm, no murmurs/rubs/gallops  Abd: Soft, nontender, nondistended, bowel sounds present  Extremities: No peripheral edema, no clubbing, pulses intact   Skin: No rashes or lesions  Neuro: AOx3, no focal neurologic deficits, CN II-XII grossly intact  Psych: appropriate mood and affect       Data Review:       Labs:     Recent Labs   Lab 01/17/25 0319 01/18/25 0340 01/19/25  0440 01/20/25  0558   RBC 3.18* 2.93* 2.76* 3.13*   HGB 9.9* 9.4* 8.7* 9.9*   HCT 28.1* 26.7* 25.1* 28.7*   MCV 88.4 91.1 90.9 91.7   MCH 31.1 32.1 31.5 31.6   MCHC 35.2 35.2 34.7 34.5   RDW 12.9 13.9 13.6 13.5   NEPRELIM 12.44*  --   --   --    WBC 13.8* 20.6* 14.5* 11.4*   .0 174.0 156.0 211.0         Recent Labs   Lab 01/18/25 0340  01/19/25  0440 01/19/25  1653 01/20/25  0558   *  153* 124*  --  116*   BUN 26*  26* 23  --  27*   CREATSERUM 1.16  1.16 0.86  --  0.93   EGFRCR 69  69 95  --  91   CA 8.7  8.7 8.6*  --  9.1     140 141  --  141   K 4.2  4.2 3.6 3.9 3.5     105 106  --  106   CO2 27.0  27.0 28.0  --  27.0       Recent Labs   Lab 01/15/25  1930 01/16/25  1010 01/17/25  0319 01/18/25  0340   ALT 35  --   --   --    AST 34*  --   --   --    ALB 4.4 4.6 3.5 4.0         Imaging:  XR CHEST AP PORTABLE  (CPT=71045)    Result Date: 1/17/2025  CONCLUSION:  Interval removal of lines and tubes.  No acute cardiopulmonary disease.   LOCATION:  [facility]      Dictated by (CST): Lisa Rowley DO on 1/17/2025 at 3:33 PM     Finalized by (CST): Lisa Rowley DO on 1/17/2025 at 3:34 PM          Meds:      multivitamin  1 tablet Oral Daily    guaiFENesin ER  1,200 mg Oral BID    furosemide  40 mg Intravenous BID (Diuretic)    insulin aspart  1-5 Units Subcutaneous TID AC and HS    rosuvastatin  40 mg Oral Nightly    aspirin  81 mg Oral Daily    sennosides  8.6 mg Oral BID    docusate sodium  100 mg Oral BID    metoprolol tartrate  12.5 mg Oral 2x Daily(Beta Blocker)    mupirocin  1 Application Nasal BID    pantoprazole  40 mg Intravenous QAM AC    Or    pantoprazole  40 mg Oral QAM AC    cetirizine  10 mg Oral Daily      sodium chloride 10 mL/hr at 01/16/25 5615       acetaminophen    HYDROcodone-acetaminophen **OR** HYDROcodone-acetaminophen    ipratropium-albuterol    melatonin    polyethylene glycol (PEG 3350)    bisacodyl    ondansetron    morphINE **OR** morphINE    glucose **OR** glucose **OR** glucose-vitamin C **OR** dextrose **OR** glucose **OR** glucose **OR** glucose-vitamin C       Assessment/Plan:     64 year old male with a history of hyperlipidemia and CAD who underwent cardiac cath on 1/15 with Dr. Grewal showing critical ostial left main stenosis with good distal targets for bypass. Underwent successful bypass  1/16.      CAD with severe ostial left main stenosis s/p 2V CABG - LIMA LAD SVG OM (1/16/25)   -Tolerated procedure well  -Management per CT surgery, cardiology  -ASA, statin  -metoprolol started    -One time dose lasix, prn per cardiology- monitor I's/O's   -Chest tubes removed 1/17  -Off insulin ggt, transitioned to sliding scale for sugars - glucose levels stable  -PT/OT/cardiac rehab   -Monitor RFP - creatinine stable       Mild postop anemia  - stable     Hyperlipidemia  -Statin     Dispo: Medically stable, dc per cardiology/CTS    Sarah Weiss MD  [facility] Hospitalist  Contact via Cardback/eBuddy/Zoop    Supplementary Documentation:   DVT Mechanical Prophylaxis: GLORIA hose,      DVT Pharmacologic Prophylaxis   Medication   None      DVT Pharmacologic prophylaxis: Aspirin 162 mg         Code Status: Not on file  Corbett: No urinary catheter in place  Corbett Duration (in days): 1  Central line:    TAN: 1/20/2025

## 2025-01-20 NOTE — HOME CARE LIAISON
Received referral via [facility] for [facility] services. Spoke w/ patient who is agreeable with [facility]. Contact information placed on AVS.   
Yes...

## 2025-01-20 NOTE — PROGRESS NOTES
[facility]   part of [facility]     CV Surgery Progress Note    Juan Pablo Lam Patient Status:  Inpatient    1960 MRN [id number]   Location [facility] 6NE-A Attending Mathew Grewal MD   Hosp Day # 5 PCP Parker Clancy MD     Subjective:  Patient reports feeling good. Pain is controlled. Denies any dyspnea. Only issue at this time is the left wrist drop. Ambulating well.     Tele: SR    Objective:  /63 (BP Location: Right arm)   Pulse 82   Temp 98.5 °F (36.9 °C) (Oral)   Resp 19   Ht 5' 10\" (1.778 m)   Wt 217 lb 6 oz (98.6 kg)   SpO2 98%   BMI 31.19 kg/m²     Intake/Output:    Intake/Output Summary (Last 24 hours) at 2025 1116  Last data filed at 2025 0919  Gross per 24 hour   Intake 2220 ml   Output 3300 ml   Net -1080 ml       Labs:  Lab Results   Component Value Date    WBC 11.4 2025    RBC 3.13 2025    HGB 9.9 2025    HCT 28.7 2025    MCV 91.7 2025    MCH 31.6 2025    MCHC 34.5 2025    RDW 13.5 2025    .0 2025     Lab Results   Component Value Date     2025    K 3.5 2025     2025    CO2 27.0 2025    BUN 27 2025    CREATSERUM 0.93 2025     2025    CA 9.1 2025     Lab Results   Component Value Date    INR 0.74 (L) 2025    INR 1.49 (H) 2025    INR 1.05 01/15/2025         Physical Exam:  General: VSS, A&Ox3, In NAD  Neck: No JVD.  Lungs: clear anteriorly   Heart: S1,S2 RRR. Sternum Stable   Abdomen: Soft, non-tender  Extremities: Warm, dry, no edema  Skin: sternotomy incision C/D/I, LE incision C/D/I  Neuro: L wrist drop     Assessment/Plan:   S/P CABGx2 with LIMA-LAD, SVG-OM POD#4  -HD stable, off support  -Left wrist drop, consult neuro   -Acute post op blood loss anemia, expected, improved- monitor   -Mild vol OL, improving- last dose lasix tomorrow morning    -Renal function intact, great UOP- monitor   -Bowel regimen   -Pain  management, prn norco   -Chest tubes removed   -Discontinue PW  -GI PPX: protonix   -DVT PPX: TEDs/SCDs  -Encourage IS/ambulation   -PT/OT/Cardiac rehab   -Anticipate discharge tomorrow      -D/W Dr. Coffman/Tania Celeste PA-KING   Cardiothoracic Surgery   1/20/2025  11:17 AM

## 2025-01-20 NOTE — PROGRESS NOTES
[facility] Cardiology  Consultation Note      Juan Pablo Lam Patient Status:  Inpatient    1960 MRN [id number]   Location [facility] CARDIOVASCULAR SURGERY Attending Mathew Grewal MD   Hosp Day # 5 PCP Parker Clancy MD     Reason for consult: CAD    Events: No CP or SOB. L wrist unable to extend.     History of Present Illness:  Juan Pablo Lam is a 64 year old male who presented to [facility] on 1/15/2025 for elective cath. Had abnormal nuc stress. Chest pressure at high intensity exertion only, last few months. None at rest. Cath with ostial left main stenosis. .     Medications:  Current Facility-Administered Medications   Medication Dose Route Frequency    multivitamin (Tab-A-Eder/Beta Carotene) tab 1 tablet  1 tablet Oral Daily    guaiFENesin ER (Mucinex) 12 hr tab 1,200 mg  1,200 mg Oral BID    furosemide (Lasix) 10 mg/mL injection 40 mg  40 mg Intravenous BID (Diuretic)    acetaminophen (Tylenol Extra Strength) tab 1,000 mg  1,000 mg Oral Q6H PRN    insulin aspart (NovoLOG) 100 Units/mL FlexPen 1-5 Units  1-5 Units Subcutaneous TID AC and HS    HYDROcodone-acetaminophen (Norco) 5-325 MG per tab 1 tablet  1 tablet Oral Q4H PRN    Or    HYDROcodone-acetaminophen (Norco) 5-325 MG per tab 2 tablet  2 tablet Oral Q4H PRN    rosuvastatin (Crestor) tab 40 mg  40 mg Oral Nightly    ipratropium-albuterol (Duoneb) 0.5-2.5 (3) MG/3ML inhalation solution 3 mL  3 mL Nebulization Q4H PRN    aspirin chewable tab 81 mg  81 mg Oral Daily    sodium chloride 0.9% infusion   Intravenous Continuous    melatonin tab 3 mg  3 mg Oral Nightly PRN    sennosides (Senokot) tab 8.6 mg  8.6 mg Oral BID    docusate sodium (Colace) cap 100 mg  100 mg Oral BID    polyethylene glycol (PEG 3350) (Miralax) 17 g oral packet 17 g  17 g Oral Daily PRN    bisacodyl (Dulcolax) 10 MG rectal suppository 10 mg  10 mg Rectal Daily PRN    ondansetron (Zofran) 4 MG/2ML injection 4 mg  4 mg Intravenous Q6H PRN    metoprolol  tartrate (Lopressor) partial tab 12.5 mg  12.5 mg Oral 2x Daily(Beta Blocker)    mupirocin (Bactroban) 2% nasal ointment 1 Application  1 Application Nasal BID    morphINE PF 2 MG/ML injection 2 mg  2 mg Intravenous Q2H PRN    Or    morphINE PF 4 MG/ML injection 4 mg  4 mg Intravenous Q2H PRN    pantoprazole (Protonix) 40 mg in sodium chloride 0.9% PF 10 mL IV push  40 mg Intravenous QAM AC    Or    pantoprazole (Protonix) DR tab 40 mg  40 mg Oral QAM AC    glucose (Dex4) 15 GM/59ML oral liquid 15 g  15 g Oral Q15 Min PRN    Or    glucose (Glutose) 40% oral gel 15 g  15 g Oral Q15 Min PRN    Or    glucose-vitamin C (Dex-4) chewable tab 4 tablet  4 tablet Oral Q15 Min PRN    Or    dextrose 50% injection 50 mL  50 mL Intravenous Q15 Min PRN    Or    glucose (Dex4) 15 GM/59ML oral liquid 30 g  30 g Oral Q15 Min PRN    Or    glucose (Glutose) 40% oral gel 30 g  30 g Oral Q15 Min PRN    Or    glucose-vitamin C (Dex-4) chewable tab 8 tablet  8 tablet Oral Q15 Min PRN    cetirizine (ZyrTEC) tab 10 mg  10 mg Oral Daily       Past Medical History:    Coronary atherosclerosis    High cholesterol       Past Surgical History:   Procedure Laterality Date    [place] teeth removed      1982       Family History  family history is not on file.    Social History   reports that he has never smoked. He has never used smokeless tobacco. He reports current alcohol use. He reports that he does not use drugs.     Allergies  Allergies[1]    Review of Systems:  As per HPI, otherwise 10 point ROS is negative in detail.    Physical Exam:  Blood pressure 145/63, pulse 82, temperature 98.5 °F (36.9 °C), temperature source Oral, resp. rate 19, height 70\", weight 217 lb 6 oz (98.6 kg), SpO2 98%.  Temp (24hrs), Av.5 °F (36.9 °C), Min:98.1 °F (36.7 °C), Max:99.1 °F (37.3 °C)    Wt Readings from Last 3 Encounters:   25 217 lb 6 oz (98.6 kg)   22 222 lb (100.7 kg)       General: Awake and alert; in no acute distress  HEENT: Extraocular  movements are intact; sclerae are anicteric; scalp is atraumatic  Neck: Supple; no JVD; no carotid bruits  Cardiac: Regular rate and regular rhythm; normal S1 and S2, no murmurs, rubs, or gallops are appreciated  Lungs: Clear to auscultation bilaterally; no accessory muscle use is noted, no wheezes, rhonci or rales  Abdomen: Soft, non-distended, non-tender; bowel sounds are normoactive  Extremities: Warm, no edema, clubbing or cyanosis; moves all 4 extremities normally, distal pulses intact and equal  Psychiatric: Normal mood and affect; answers questions appropriately  Dermatologic: No rashes; normal skin turgor  L wrist drop    Diagnostic testing:    Labs:   Lab Results   Component Value Date    INR 0.74 (L) 01/16/2025    INR 1.49 (H) 01/16/2025        Lab Results   Component Value Date    WBC 11.4 01/20/2025    HGB 9.9 01/20/2025    HCT 28.7 01/20/2025    .0 01/20/2025    CREATSERUM 0.93 01/20/2025    BUN 27 01/20/2025     01/20/2025    K 3.5 01/20/2025     01/20/2025    CO2 27.0 01/20/2025     01/20/2025    CA 9.1 01/20/2025    PGLU 128 01/20/2025       Cardiac diagnostics:    Telemetry: Predominantly sinus rhythm, no malignant arrhythmias     Impression:  64 year old male with stable angina but severe ostial left main stenosis.   S/p 2V CABG: LIMA LAD SVG OM (1/16/25)  HTN  L wrist drop, likely ulnar neuropathy post surgery    Recommendations:  ASA, statin  Increase to Toprol 50mg daily  Appears euvolemic, on RA. Stop lasix at dc.   Pacer wires out per CTS.   Consider evaluation by hand specialist, neuro  Otherwise stable from cardiac standpoint for dc          [1] No Known Allergies

## 2025-01-21 PROBLEM — M21.332 WRIST DROP, ACQUIRED, LEFT: Status: ACTIVE | Noted: 2025-01-21

## 2025-01-21 LAB
ANION GAP SERPL CALC-SCNC: 9 MMOL/L (ref 0–18)
BUN BLD-MCNC: 28 MG/DL (ref 9–23)
CALCIUM BLD-MCNC: 9.6 MG/DL (ref 8.7–10.6)
CHLORIDE SERPL-SCNC: 107 MMOL/L (ref 98–112)
CO2 SERPL-SCNC: 26 MMOL/L (ref 21–32)
CREAT BLD-MCNC: 1 MG/DL
EGFRCR SERPLBLD CKD-EPI 2021: 83 ML/MIN/1.73M2 (ref 60–?)
ERYTHROCYTE [DISTWIDTH] IN BLOOD BY AUTOMATED COUNT: 13.5 %
GLUCOSE BLD-MCNC: 107 MG/DL (ref 70–99)
GLUCOSE BLD-MCNC: 117 MG/DL (ref 70–99)
GLUCOSE BLD-MCNC: 120 MG/DL (ref 70–99)
GLUCOSE BLD-MCNC: 123 MG/DL (ref 70–99)
GLUCOSE BLD-MCNC: 163 MG/DL (ref 70–99)
HCT VFR BLD AUTO: 34.4 %
HGB BLD-MCNC: 11.6 G/DL
MCH RBC QN AUTO: 31.2 PG (ref 26–34)
MCHC RBC AUTO-ENTMCNC: 33.7 G/DL (ref 31–37)
MCV RBC AUTO: 92.5 FL
OSMOLALITY SERPL CALC.SUM OF ELEC: 301 MOSM/KG (ref 275–295)
PLATELET # BLD AUTO: 296 10(3)UL (ref 150–450)
POTASSIUM SERPL-SCNC: 4 MMOL/L (ref 3.5–5.1)
RBC # BLD AUTO: 3.72 X10(6)UL
SODIUM SERPL-SCNC: 142 MMOL/L (ref 136–145)
WBC # BLD AUTO: 12.3 X10(3) UL (ref 4–11)

## 2025-01-21 PROCEDURE — 99223 1ST HOSP IP/OBS HIGH 75: CPT | Performed by: OTHER

## 2025-01-21 RX ORDER — METOPROLOL SUCCINATE 50 MG/1
50 TABLET, EXTENDED RELEASE ORAL
Qty: 90 TABLET | Refills: 3 | Status: SHIPPED | OUTPATIENT
Start: 2025-01-22 | End: 2026-01-17

## 2025-01-21 RX ORDER — ROSUVASTATIN CALCIUM 40 MG/1
40 TABLET, COATED ORAL NIGHTLY
Qty: 90 TABLET | Refills: 3 | Status: SHIPPED | OUTPATIENT
Start: 2025-01-21 | End: 2026-01-16

## 2025-01-21 RX ADMIN — ROSUVASTATIN CALCIUM 40 MG: 20 TABLET, COATED ORAL at 21:02:00

## 2025-01-21 RX ADMIN — CALCIUM CARBONATE 500 MG: 500 TABLET, CHEWABLE ORAL at 17:32:00

## 2025-01-21 RX ADMIN — GUAIFENESIN 1200 MG: 600 TABLET, EXTENDED RELEASE ORAL at 21:01:00

## 2025-01-21 RX ADMIN — DOCUSATE SODIUM 100 MG: 100 CAPSULE, LIQUID FILLED ORAL at 21:01:00

## 2025-01-21 RX ADMIN — SENNOSIDES 8.6 MG: 8.6 MG TABLET ORAL at 08:38:00

## 2025-01-21 RX ADMIN — PANTOPRAZOLE SODIUM 40 MG: 40 TABLET, DELAYED RELEASE ORAL at 06:21:00

## 2025-01-21 RX ADMIN — CALCIUM CARBONATE 500 MG: 500 TABLET, CHEWABLE ORAL at 00:09:00

## 2025-01-21 RX ADMIN — CALCIUM CARBONATE 500 MG: 500 TABLET, CHEWABLE ORAL at 08:37:00

## 2025-01-21 RX ADMIN — FUROSEMIDE 40 MG: 10 INJECTION INTRAMUSCULAR; INTRAVENOUS at 08:39:00

## 2025-01-21 RX ADMIN — METOPROLOL SUCCINATE 50 MG: 50 TABLET, EXTENDED RELEASE ORAL at 06:21:00

## 2025-01-21 RX ADMIN — GUAIFENESIN 1200 MG: 600 TABLET, EXTENDED RELEASE ORAL at 08:38:00

## 2025-01-21 RX ADMIN — ASPIRIN 81 MG: 81 TABLET, CHEWABLE ORAL at 08:38:00

## 2025-01-21 RX ADMIN — SENNOSIDES 8.6 MG: 8.6 MG TABLET ORAL at 21:01:00

## 2025-01-21 RX ADMIN — CETIRIZINE HYDROCHLORIDE 10 MG: 10 TABLET ORAL at 08:39:00

## 2025-01-21 RX ADMIN — DOXEPIN HYDROCHLORIDE 1 TABLET: 50 CAPSULE ORAL at 08:38:00

## 2025-01-21 RX ADMIN — DOCUSATE SODIUM 100 MG: 100 CAPSULE, LIQUID FILLED ORAL at 08:38:00

## 2025-01-21 NOTE — PAYOR COMM NOTE
--------------  CONTINUED STAY REVIEW    Payor: [employer]  Subscriber #:  [id number]  Authorization Number: [id number]    Admit date: 1/15/25  Admit time:  5:19 PM    REVIEW DOCUMENTATION:          1/20 IM        OBJECTIVE:     Blood pressure 145/63, pulse 82, temperature 98.5 °F (36.9 °C), temperature source Oral, resp. rate 19, height 5' 10\" (1.778 m), weight 217 lb 6 oz (98.6 kg), SpO2 98%.     Temp:  [98.1 °F (36.7 °C)-99.1 °F (37.3 °C)] 98.5 °F (36.9 °C)  Pulse:  [81-99] 82  Resp:  [18-20] 19  BP: (132-147)/(60-77) 145/63  SpO2:  [92 %-98 %] 98 %       Exam   Gen: Alert, no acute distress  Heent: Normocephalic, atraumatic, neck supple, EOMI, PERRLA  Pulm: Lungs CTAB, normal respiratory effort  CV:  Regular rate and rhythm, no murmurs/rubs/gallops  Abd: Soft, nontender, nondistended, bowel sounds present  Extremities: No peripheral edema, no clubbing, pulses intact   Skin: No rashes or lesions  Neuro: AOx3, no focal neurologic deficits, CN II-XII grossly intact  Psych: appropriate mood and affect       Assessment/Plan:      64 year old male with a history of hyperlipidemia and CAD who underwent cardiac cath on 1/15 with Dr. Grewal showing critical ostial left main stenosis with good distal targets for bypass. Underwent successful bypass 1/16.      CAD with severe ostial left main stenosis s/p 2V CABG - LIMA LAD SVG OM (1/16/25)   -Tolerated procedure well  -Management per CT surgery, cardiology  -ASA, statin  -metoprolol started    -One time dose lasix, prn per cardiology- monitor I's/O's   -Chest tubes removed 1/17  -Off insulin ggt, transitioned to sliding scale for sugars - glucose levels stable  -PT/OT/cardiac rehab   -Monitor RFP - creatinine stable       Mild postop anemia  - stable      Hyperlipidemia  -Statin     Dispo: Medically stable, dc per cardiology/CTS         1/20 Cardiology    Reason for consult: CAD     Events: No CP or SOB. L wrist unable to extend.      History of Present Illness:  Juan Pablo BYNUM  Genaro is a 64 year old male who presented to [facility] on 1/15/2025 for elective cath. Had abnormal nuc stress. Chest pressure at high intensity exertion only, last few months. None at rest. Cath with ostial left main stenosis. .      Physical Exam:  Blood pressure 145/63, pulse 82, temperature 98.5 °F (36.9 °C), temperature source Oral, resp. rate 19, height 70\", weight 217 lb 6 oz (98.6 kg), SpO2 98%.  Temp (24hrs), Av.5 °F (36.9 °C), Min:98.1 °F (36.7 °C), Max:99.1 °F (37.3 °C)       Cardiac diagnostics:     Telemetry: Predominantly sinus rhythm, no malignant arrhythmias      Impression:  64 year old male with stable angina but severe ostial left main stenosis.   S/p 2V CABG: LIMA LAD SVG OM (25)  HTN  L wrist drop, likely ulnar neuropathy post surgery     Recommendations:  ASA, statin  Increase to Toprol 50mg daily  Appears euvolemic, on RA. Stop lasix at dc.   Pacer wires out per CTS.   Consider evaluation by hand specialist, neuro  Otherwise stable from cardiac standpoint for dc               MEDICATIONS ADMINISTERED IN LAST 1 DAY:  aspirin chewable tab 81 mg       Date Action Dose Route User    2025 0838 Given 81 mg Oral Miladys Higgins RN          calcium carbonate (Tums) chewable tab 500 mg       Date Action Dose Route User    2025 0837 Given 500 mg Oral Miladys Higgins RN    2025 0009 Given 500 mg Oral Barron Quinn RN    2025 181 Given 500 mg Oral Jorge Luis Tucker RN          cetirizine (ZyrTEC) tab 10 mg       Date Action Dose Route User    2025 0839 Given 10 mg Oral Miladys Higgins RN          docusate sodium (Colace) cap 100 mg       Date Action Dose Route User    2025 0838 Given 100 mg Oral Miladys Higgins RN    2025 210 Given 100 mg Oral Barron Quinn RN          furosemide (Lasix) 10 mg/mL injection 40 mg       Date Action Dose Route User    2025 0839 Given 40 mg Intravenous Miladys Higgins RN    2025 2647 Given 40 mg  Intravenous Jorge Luis Tucker RN          guaiFENesin ER (Mucinex) 12 hr tab 1,200 mg       Date Action Dose Route User    1/21/2025 0838 Given 1,200 mg Oral Miladys Higgins RN    1/20/2025 2059 Given 1,200 mg Oral Barron Quinn RN          insulin aspart (NovoLOG) 100 Units/mL FlexPen 1-5 Units       Date Action Dose Route User    1/21/2025 0008 Given 1 Units Subcutaneous (Right Upper Arm) Barron Quinn RN    1/20/2025 1903 Given 1 Units Subcutaneous (Right Upper Arm) Jorge Luis Tucker RN          metoprolol succinate ER (Toprol XL) 24 hr tab 50 mg       Date Action Dose Route User    1/21/2025 0621 Given 50 mg Oral Barron Quinn RN          mupirocin (Bactroban) 2% nasal ointment 1 Application       Date Action Dose Route User    1/20/2025 2100 Given 1 Application Nasal (Bilateral Nares) Barron Quinn RN          pantoprazole (Protonix) DR tab 40 mg       Date Action Dose Route User    1/21/2025 0621 Given 40 mg Oral Barron Quinn RN          potassium chloride (Klor-Con M20) tab 40 mEq       Date Action Dose Route User    1/20/2025 1704 Given 40 mEq Oral Jorge Luis Tucker RN          rosuvastatin (Crestor) tab 40 mg       Date Action Dose Route User    1/20/2025 2059 Given 40 mg Oral Barron Quinn RN          sennosides (Senokot) tab 8.6 mg       Date Action Dose Route User    1/21/2025 0838 Given 8.6 mg Oral Miladys Higgins RN    1/20/2025 2100 Given 8.6 mg Oral Barron Quinn RN          multivitamin (Tab-A-Eder/Beta Carotene) tab 1 tablet       Date Action Dose Route User    1/21/2025 0838 Given 1 tablet Oral Miladys Higgins RN            Vitals (last day)       Date/Time Temp Pulse Resp BP SpO2 Weight O2 Device O2 Flow Rate (L/min) [facility]    01/21/25 1231 -- 111 -- -- 94 % -- -- -- NN    01/21/25 1208 98.1 °F (36.7 °C) 81 17 132/67 93 % -- None (Room air) -- NN    01/21/25 0807 97.9 °F (36.6 °C) 80 19 130/67 90 % -- None (Room air) --     01/21/25 0600 98.8 °F (37.1 °C) 73 18 128/50 94 % 210 lb 15.7 oz (95.7 kg)  None (Room air) -- ES    01/20/25 2345 98.6 °F (37 °C) 81 18 122/59 94 % -- None (Room air) -- ES    01/20/25 2255 -- 82 -- -- -- -- -- -- LD    01/20/25 2029 98.2 °F (36.8 °C) 94 17 118/78 97 % -- None (Room air) 0 L/min LD    01/20/25 1850 -- 97 -- -- 100 % -- -- -- NN    01/20/25 1743 -- 98 -- -- 95 % -- -- -- NN    01/20/25 1700 98.1 °F (36.7 °C) 96 19 145/69 93 % -- None (Room air) -- NN    01/20/25 1500 -- 101 -- -- -- -- -- -- NN    01/20/25 1151 98 °F (36.7 °C) 85 20 155/67 91 % -- None (Room air) -- NN    01/20/25 0807 98.5 °F (36.9 °C) 82 19 145/63 -- -- None (Room air) -- MG    01/20/25 0525 98.3 °F (36.8 °C) 81 18 132/77 98 % 217 lb 6 oz (98.6 kg) None (Room air) -- SS            Blood Transfusion Record       Product Unit Status Volume Start End            Transfuse fresh frozen plasma       24  [id number]  [id number] Completed 01/16/25 1735 305 mL 01/16/25 1648 01/16/25 1658       24  [id number]  [id number] Completed 01/16/25 1735 301 mL 01/16/25 1633 01/16/25 1643                Transfuse platelets       24  [id number]  [id number] Completed 01/16/25 1735 565 mL 01/16/25 1617 01/16/25 1627               [facility] Reference Range & Units 01/21/25 06:18   WBC 4.0 - 11.0 x10(3) uL 12.3 (H)   Hemoglobin 13.0 - 17.5 g/dL 11.6 (L)   Hematocrit 39.0 - 53.0 % 34.4 (L)   Platelet Count 150.0 - 450.0 10(3)uL 296.0   RBC 3.80 - 5.80 x10(6)uL 3.72 (L)   (H): Data is abnormally high  (L): Data is abnormally low

## 2025-01-21 NOTE — PROGRESS NOTES
[facility] Cardiology  Consultation Note      Juan Pablo Lam Patient Status:  Inpatient    1960 MRN [id number]   Location [facility] CARDIOVASCULAR SURGERY Attending Mathew Grewal MD   Hosp Day # 6 PCP Parker Clancy MD     Reason for consult: CAD    Events: No CP or SOB. L wrist drop persists.     History of Present Illness:  Juan Pablo Lam is a 64 year old male who presented to [facility] on 1/15/2025 for elective cath. Had abnormal nuc stress. Chest pressure at high intensity exertion only, last few months. None at rest. Cath with ostial left main stenosis. .     Medications:  Current Facility-Administered Medications   Medication Dose Route Frequency    metoprolol succinate ER (Toprol XL) 24 hr tab 50 mg  50 mg Oral Daily Beta Blocker    calcium carbonate (Tums) chewable tab 500 mg  500 mg Oral TID PRN    multivitamin (Tab-A-Eder/Beta Carotene) tab 1 tablet  1 tablet Oral Daily    guaiFENesin ER (Mucinex) 12 hr tab 1,200 mg  1,200 mg Oral BID    acetaminophen (Tylenol Extra Strength) tab 1,000 mg  1,000 mg Oral Q6H PRN    insulin aspart (NovoLOG) 100 Units/mL FlexPen 1-5 Units  1-5 Units Subcutaneous TID AC and HS    HYDROcodone-acetaminophen (Norco) 5-325 MG per tab 1 tablet  1 tablet Oral Q4H PRN    Or    HYDROcodone-acetaminophen (Norco) 5-325 MG per tab 2 tablet  2 tablet Oral Q4H PRN    rosuvastatin (Crestor) tab 40 mg  40 mg Oral Nightly    ipratropium-albuterol (Duoneb) 0.5-2.5 (3) MG/3ML inhalation solution 3 mL  3 mL Nebulization Q4H PRN    aspirin chewable tab 81 mg  81 mg Oral Daily    sodium chloride 0.9% infusion   Intravenous Continuous    melatonin tab 3 mg  3 mg Oral Nightly PRN    sennosides (Senokot) tab 8.6 mg  8.6 mg Oral BID    docusate sodium (Colace) cap 100 mg  100 mg Oral BID    polyethylene glycol (PEG 3350) (Miralax) 17 g oral packet 17 g  17 g Oral Daily PRN    bisacodyl (Dulcolax) 10 MG rectal suppository 10 mg  10 mg Rectal Daily PRN    ondansetron  (Zofran) 4 MG/2ML injection 4 mg  4 mg Intravenous Q6H PRN    morphINE PF 2 MG/ML injection 2 mg  2 mg Intravenous Q2H PRN    Or    morphINE PF 4 MG/ML injection 4 mg  4 mg Intravenous Q2H PRN    pantoprazole (Protonix) 40 mg in sodium chloride 0.9% PF 10 mL IV push  40 mg Intravenous QAM AC    Or    pantoprazole (Protonix) DR tab 40 mg  40 mg Oral QAM AC    glucose (Dex4) 15 GM/59ML oral liquid 15 g  15 g Oral Q15 Min PRN    Or    glucose (Glutose) 40% oral gel 15 g  15 g Oral Q15 Min PRN    Or    glucose-vitamin C (Dex-4) chewable tab 4 tablet  4 tablet Oral Q15 Min PRN    Or    dextrose 50% injection 50 mL  50 mL Intravenous Q15 Min PRN    Or    glucose (Dex4) 15 GM/59ML oral liquid 30 g  30 g Oral Q15 Min PRN    Or    glucose (Glutose) 40% oral gel 30 g  30 g Oral Q15 Min PRN    Or    glucose-vitamin C (Dex-4) chewable tab 8 tablet  8 tablet Oral Q15 Min PRN    cetirizine (ZyrTEC) tab 10 mg  10 mg Oral Daily       Past Medical History:    Coronary atherosclerosis    High cholesterol       Past Surgical History:   Procedure Laterality Date    [place] teeth removed      1982       Family History  family history is not on file.    Social History   reports that he has never smoked. He has never used smokeless tobacco. He reports current alcohol use. He reports that he does not use drugs.     Allergies  Allergies[1]    Review of Systems:  As per HPI, otherwise 10 point ROS is negative in detail.    Physical Exam:  Blood pressure 130/67, pulse 80, temperature 97.9 °F (36.6 °C), temperature source Oral, resp. rate 19, height 70\", weight 210 lb 15.7 oz (95.7 kg), SpO2 90%.  Temp (24hrs), Av.3 °F (36.8 °C), Min:97.9 °F (36.6 °C), Max:98.8 °F (37.1 °C)    Wt Readings from Last 3 Encounters:   25 210 lb 15.7 oz (95.7 kg)   22 222 lb (100.7 kg)       General: Awake and alert; in no acute distress  HEENT: Extraocular movements are intact; sclerae are anicteric; scalp is atraumatic  Neck: Supple; no JVD; no  carotid bruits  Cardiac: Regular rate and regular rhythm; normal S1 and S2, no murmurs, rubs, or gallops are appreciated  Lungs: Clear to auscultation bilaterally; no accessory muscle use is noted, no wheezes, rhonci or rales  Abdomen: Soft, non-distended, non-tender; bowel sounds are normoactive  Extremities: Warm, no edema, clubbing or cyanosis; moves all 4 extremities normally, distal pulses intact and equal  Psychiatric: Normal mood and affect; answers questions appropriately  Dermatologic: No rashes; normal skin turgor  L wrist drop    Diagnostic testing:    Labs:   Lab Results   Component Value Date    INR 0.74 (L) 01/16/2025    INR 1.49 (H) 01/16/2025        Lab Results   Component Value Date    WBC 12.3 01/21/2025    HGB 11.6 01/21/2025    HCT 34.4 01/21/2025    .0 01/21/2025    CREATSERUM 1.00 01/21/2025    BUN 28 01/21/2025     01/21/2025    K 4.0 01/21/2025     01/21/2025    CO2 26.0 01/21/2025     01/21/2025    CA 9.6 01/21/2025    PGLU 117 01/21/2025       Cardiac diagnostics:    Telemetry: Predominantly sinus rhythm, no malignant arrhythmias     Impression:  64 year old male with stable angina but severe ostial left main stenosis.   S/p 2V CABG: LIMA LAD SVG OM (1/16/25)  HTN  L wrist drop, likely ulnar n. neuropathy post surgery    Recommendations:  ASA, statin  Toprol 50mg daily  Appears euvolemic, on RA. Stop lasix at dc.   Pacer wires out per CTS.   PT/OT. Consider evaluation by hand specialist, neuro  Otherwise stable from cardiac standpoint for dc        [1] No Known Allergies

## 2025-01-21 NOTE — PLAN OF CARE
Patient alert and oriented x 4. Up ad nery. On RA. NSR on tele. Continent of bowel and bladder. No complaints of pain, shortness of breath or chest pain/discomfort. Sternal and LLE incision C/D/I, painted with Betadine. Reports of indigestion, prn antacids given. POC : Neuro to see, IV Lasix, bowel regimen, IS/ambulation. Fall precautions in place. Call light within reach.    Problem: Patient/Family Goals  Goal: Patient/Family Long Term Goal  Description: Patient's Long Term Goal: Stay Healthy    Interventions:  - Resume home med regimen  -Follow up with providers  - See additional Care Plan goals for specific interventions  Outcome: Progressing  Goal: Patient/Family Short Term Goal  Description: Patient's Short Term Goal: Discharge from hospital    Interventions:   - CABG 1/16  - See additional Care Plan goals for specific interventions  Outcome: Progressing     Problem: CARDIOVASCULAR - ADULT  Goal: Maintains optimal cardiac output and hemodynamic stability  Description: INTERVENTIONS:  - Monitor vital signs, rhythm, and trends  - Monitor for bleeding, hypotension and signs of decreased cardiac output  - Evaluate effectiveness of vasoactive medications to optimize hemodynamic stability  - Monitor arterial and/or venous puncture sites for bleeding and/or hematoma  - Assess quality of pulses, skin color and temperature  - Assess for signs of decreased coronary artery perfusion - ex. Angina  - Evaluate fluid balance, assess for edema, trend weights  Outcome: Progressing  Goal: Absence of cardiac arrhythmias or at baseline  Description: INTERVENTIONS:  - Continuous cardiac monitoring, monitor vital signs, obtain 12 lead EKG if indicated  - Evaluate effectiveness of antiarrhythmic and heart rate control medications as ordered  - Initiate emergency measures for life threatening arrhythmias  - Monitor electrolytes and administer replacement therapy as ordered  Outcome: Progressing     Problem: RESPIRATORY - ADULT  Goal:  Achieves optimal ventilation and oxygenation  Description: INTERVENTIONS:  - Assess for changes in respiratory status  - Assess for changes in mentation and behavior  - Position to facilitate oxygenation and minimize respiratory effort  - Oxygen supplementation based on oxygen saturation or ABGs  - Provide Smoking Cessation handout, if applicable  - Encourage broncho-pulmonary hygiene including cough, deep breathe, Incentive Spirometry  - Assess the need for suctioning and perform as needed  - Assess and instruct to report SOB or any respiratory difficulty  - Respiratory Therapy support as indicated  - Manage/alleviate anxiety  - Monitor for signs/symptoms of CO2 retention  Outcome: Progressing     Problem: Diabetes/Glucose Control  Goal: Glucose maintained within prescribed range  Description: INTERVENTIONS:  - Monitor Blood Glucose as ordered  - Assess for signs and symptoms of hyperglycemia and hypoglycemia  - Administer ordered medications to maintain glucose within target range  - Assess barriers to adequate nutritional intake and initiate nutrition consult as needed  - Instruct patient on self management of diabetes  Outcome: Progressing

## 2025-01-21 NOTE — PROGRESS NOTES
[facility]   part of [facility]     CV Surgery Progress Note    Juan Pablo Lam Patient Status:  Inpatient    1960 MRN [id number]   Location [facility] 6NE-A Attending Mathew Grewal MD   Hosp Day # 6 PCP Parker Clancy MD     Subjective:  Patient reports feeling good, ready to go home after neurology consult. Offers no current complaints.     Tele: SR    Objective:  /67 (BP Location: Left arm)   Pulse 111   Temp 98.1 °F (36.7 °C) (Oral)   Resp 17   Ht 5' 10\" (1.778 m)   Wt 210 lb 15.7 oz (95.7 kg)   SpO2 94%   BMI 30.27 kg/m²     Intake/Output:    Intake/Output Summary (Last 24 hours) at 2025 1258  Last data filed at 2025 1231  Gross per 24 hour   Intake 960 ml   Output 2400 ml   Net -1440 ml       Labs:  Lab Results   Component Value Date    WBC 12.3 2025    RBC 3.72 2025    HGB 11.6 2025    HCT 34.4 2025    MCV 92.5 2025    MCH 31.2 2025    MCHC 33.7 2025    RDW 13.5 2025    .0 2025     Lab Results   Component Value Date     2025    K 4.0 2025     2025    CO2 26.0 2025    BUN 28 2025    CREATSERUM 1.00 2025     2025    CA 9.6 2025     Lab Results   Component Value Date    INR 0.74 (L) 2025    INR 1.49 (H) 2025    INR 1.05 01/15/2025         Physical Exam:  General: VSS, A&Ox3, In NAD  Neck: No JVD.  Lungs: mildly diminished at bases   Heart: S1,S2 RRR. Sternum Stable   Abdomen: Soft, non-tender  Extremities: Warm, dry, no edema  Skin: sternotomy incision C/D/I, LE incision C/D/I  Neuro: L wrist drop     Assessment/Plan:   S/P CABGx2 with LIMA-LAD, SVG-OM POD#5  -HD stable, off support  -Left wrist drop, neuro to see today   -Acute post op blood loss anemia, expected, improved- monitor   -Mild vol OL, resolved based on physical exam   -Renal function intact, great UOP- monitor   -Bowel regimen   -Pain management, prn norco    -Chest tubes and PW removed   -GI PPX: protonix   -DVT PPX: TEDs/SCDs  -Encourage IS/ambulation   -PT/OT/Cardiac rehab   -Ok to discharge home later today after neuro consult from surgical standpoint      -D/W Dr. Coffman/Tania Celeste PA-C   Cardiothoracic Surgery   1/21/2025  1:00 PM

## 2025-01-21 NOTE — PLAN OF CARE
Assumed care at 1200. Pt alert, oriented x4. Oxygen saturation adequate on room air. Lung sounds clear bilaterally. Reaching 1500 on IS. Tele: NSR. POD #5 s/p CABG. Midsternal incision and L leg incisions clean, dry, intact, open to air with steri strips. Continent. Denies pain. Ambulating independently. L wrist drop, Neurology consult called, MRI screening completed. Plan of care: MRI brain to rule out cortical stroke, brace to  L wrist, IS, ambulate in halls, site care, discharge tomorrow pending MRI results. Pt updated on plan of care. Questions answered.     Problem: Patient/Family Goals  Goal: Patient/Family Long Term Goal  Description: Patient's Long Term Goal: Stay Healthy    Interventions:  - Resume home med regimen  -Follow up with providers  - See additional Care Plan goals for specific interventions  Outcome: Progressing  Goal: Patient/Family Short Term Goal  Description: Patient's Short Term Goal: Discharge from hospital    Interventions:   - CABG 1/16  - See additional Care Plan goals for specific interventions  Outcome: Progressing     Problem: CARDIOVASCULAR - ADULT  Goal: Maintains optimal cardiac output and hemodynamic stability  Description: INTERVENTIONS:  - Monitor vital signs, rhythm, and trends  - Monitor for bleeding, hypotension and signs of decreased cardiac output  - Evaluate effectiveness of vasoactive medications to optimize hemodynamic stability  - Monitor arterial and/or venous puncture sites for bleeding and/or hematoma  - Assess quality of pulses, skin color and temperature  - Assess for signs of decreased coronary artery perfusion - ex. Angina  - Evaluate fluid balance, assess for edema, trend weights  Outcome: Progressing  Goal: Absence of cardiac arrhythmias or at baseline  Description: INTERVENTIONS:  - Continuous cardiac monitoring, monitor vital signs, obtain 12 lead EKG if indicated  - Evaluate effectiveness of antiarrhythmic and heart rate control medications as ordered  -  Initiate emergency measures for life threatening arrhythmias  - Monitor electrolytes and administer replacement therapy as ordered  Outcome: Progressing     Problem: RESPIRATORY - ADULT  Goal: Achieves optimal ventilation and oxygenation  Description: INTERVENTIONS:  - Assess for changes in respiratory status  - Assess for changes in mentation and behavior  - Position to facilitate oxygenation and minimize respiratory effort  - Oxygen supplementation based on oxygen saturation or ABGs  - Provide Smoking Cessation handout, if applicable  - Encourage broncho-pulmonary hygiene including cough, deep breathe, Incentive Spirometry  - Assess the need for suctioning and perform as needed  - Assess and instruct to report SOB or any respiratory difficulty  - Respiratory Therapy support as indicated  - Manage/alleviate anxiety  - Monitor for signs/symptoms of CO2 retention  Outcome: Progressing     Problem: Diabetes/Glucose Control  Goal: Glucose maintained within prescribed range  Description: INTERVENTIONS:  - Monitor Blood Glucose as ordered  - Assess for signs and symptoms of hyperglycemia and hypoglycemia  - Administer ordered medications to maintain glucose within target range  - Assess barriers to adequate nutritional intake and initiate nutrition consult as needed  - Instruct patient on self management of diabetes  Outcome: Progressing

## 2025-01-21 NOTE — PROGRESS NOTES
[facility] Hospitalist Progress Note     CC: Hospital Follow up    PCP: Parker Clancy MD       Subjective:     Patient seen and examined.  L wrist drop is concerning for him. Waiting to d/w neurology.  Denies CP/SOB.  NAD.     OBJECTIVE:    Blood pressure 130/67, pulse 80, temperature 97.9 °F (36.6 °C), temperature source Oral, resp. rate 19, height 5' 10\" (1.778 m), weight 210 lb 15.7 oz (95.7 kg), SpO2 90%.    Temp:  [97.9 °F (36.6 °C)-98.8 °F (37.1 °C)] 97.9 °F (36.6 °C)  Pulse:  [] 80  Resp:  [17-20] 19  BP: (118-155)/(50-78) 130/67  SpO2:  [90 %-100 %] 90 %      Intake/Output:    Intake/Output Summary (Last 24 hours) at 1/21/2025 1117  Last data filed at 1/21/2025 0937  Gross per 24 hour   Intake 960 ml   Output 2500 ml   Net -1540 ml       Last 3 Weights   01/21/25 0600 210 lb 15.7 oz (95.7 kg)   01/20/25 0525 217 lb 6 oz (98.6 kg)   01/19/25 0532 222 lb 3.6 oz (100.8 kg)   01/18/25 0600 228 lb 9.6 oz (103.7 kg)   01/17/25 0500 227 lb 8.2 oz (103.2 kg)   01/16/25 0530 225 lb 1.6 oz (102.1 kg)   01/15/25 1743 225 lb (102.1 kg)   01/13/25 0842 227 lb (103 kg)   06/16/22 1207 222 lb (100.7 kg)       Exam   Gen: Alert, no acute distress  Heent: Normocephalic, atraumatic, neck supple, EOMI, PERRLA  Pulm: Lungs CTAB, normal respiratory effort  CV:  Regular rate and rhythm, no murmurs/rubs/gallops  Abd: Soft, nontender, nondistended, bowel sounds present  Extremities: No peripheral edema, no clubbing, pulses intact   Skin: No rashes or lesions  Neuro: AOx3, no focal neurologic deficits, CN II-XII grossly intact, L wrist drop  Psych: appropriate mood and affect       Data Review:       Labs:     Recent Labs   Lab 01/17/25  0319 01/18/25  0340 01/19/25  0440 01/20/25  0558 01/21/25  0618   RBC 3.18*   < > 2.76* 3.13* 3.72*   HGB 9.9*   < > 8.7* 9.9* 11.6*   HCT 28.1*   < > 25.1* 28.7* 34.4*   MCV 88.4   < > 90.9 91.7 92.5   MCH 31.1   < > 31.5 31.6 31.2   MCHC 35.2   < > 34.7 34.5 33.7   RDW  12.9   < > 13.6 13.5 13.5   NEPRELIM 12.44*  --   --   --   --    WBC 13.8*   < > 14.5* 11.4* 12.3*   .0   < > 156.0 211.0 296.0    < > = values in this interval not displayed.         Recent Labs   Lab 01/19/25  0440 01/19/25  1653 01/20/25  0558 01/20/25  2023 01/21/25  0618   *  --  116*  --  123*   BUN 23  --  27*  --  28*   CREATSERUM 0.86  --  0.93  --  1.00   EGFRCR 95  --  91  --  83   CA 8.6*  --  9.1  --  9.6     --  141  --  142   K 3.6   < > 3.5 4.1 4.0     --  106  --  107   CO2 28.0  --  27.0  --  26.0    < > = values in this interval not displayed.       Recent Labs   Lab 01/15/25  1930 01/16/25  1010 01/17/25  0319 01/18/25  0340   ALT 35  --   --   --    AST 34*  --   --   --    ALB 4.4 4.6 3.5 4.0         Imaging:  No results found.      Meds:      metoprolol succinate  50 mg Oral Daily Beta Blocker    multivitamin  1 tablet Oral Daily    guaiFENesin ER  1,200 mg Oral BID    insulin aspart  1-5 Units Subcutaneous TID AC and HS    rosuvastatin  40 mg Oral Nightly    aspirin  81 mg Oral Daily    sennosides  8.6 mg Oral BID    docusate sodium  100 mg Oral BID    pantoprazole  40 mg Intravenous QAM AC    Or    pantoprazole  40 mg Oral QAM AC    cetirizine  10 mg Oral Daily      sodium chloride 10 mL/hr at 01/16/25 1735       calcium carbonate    acetaminophen    HYDROcodone-acetaminophen **OR** HYDROcodone-acetaminophen    ipratropium-albuterol    melatonin    polyethylene glycol (PEG 3350)    bisacodyl    ondansetron    morphINE **OR** morphINE    glucose **OR** glucose **OR** glucose-vitamin C **OR** dextrose **OR** glucose **OR** glucose **OR** glucose-vitamin C       Assessment/Plan:     64 year old male with a history of hyperlipidemia and CAD who underwent cardiac cath on 1/15 with Dr. Grewal showing critical ostial left main stenosis with good distal targets for bypass. Underwent successful bypass 1/16.      CAD with severe ostial left main stenosis s/p 2V CABG - LIMA LAD  SVG OM (1/16/25)   -Tolerated procedure well  -Management per CT surgery, cardiology  -ASA, statin  -metoprolol started    -One time dose lasix, prn per cardiology- monitor I's/O's   -Chest tubes removed 1/17  -Off insulin ggt, transitioned to sliding scale for sugars - glucose levels stable  -PT/OT/cardiac rehab   -Monitor RFP - creatinine stable       Mild postop anemia  - stable     L wrist drop  - suspect ulnar nerve injury?   - neurology consulted    Hyperlipidemia  -Statin     Dispo: Medically stable, dc per cardiology/CTS    Sarah Weiss MD  [facility]  Hospitalist  Contact via [email or web address]    Supplementary Documentation:   DVT Mechanical Prophylaxis: GLORIA hose,      DVT Pharmacologic Prophylaxis   Medication   None      DVT Pharmacologic prophylaxis: Aspirin 162 mg         Code Status: Not on file  Corbett: No urinary catheter in place  Corbett Duration (in days): 1  Central line:    TAN:

## 2025-01-21 NOTE — PLAN OF CARE
Pt denies c/o pain, malaise, or cardiac symptoms. A&Ox4. Lungs diminished bilaterally with equal expansion, on room air. Pt NSR on monitor with regular rate. Abdomen soft and non-tender with active bowel sounds in all four quadrants. Continent of B&B. Mid-sternal incision, approximated, treated with betadine, steri-strips, no complications. LLEx2 incisions approximated, treated with betadine, steri-strips, no complications. Pt and family member updated with plan of care.    Neurology re-called to see patient as it's been 24hrs since consult placed. Neuro staff unsure who is rounding. Repot handed off to another RN.    Problem: Patient/Family Goals  Goal: Patient/Family Long Term Goal  Description: Patient's Long Term Goal: Stay Healthy    Interventions:  - Resume home med regimen  -Follow up with providers  - See additional Care Plan goals for specific interventions  Outcome: Progressing  Goal: Patient/Family Short Term Goal  Description: Patient's Short Term Goal: Discharge from hospital    Interventions:   - CABG 1/16  - See additional Care Plan goals for specific interventions  Outcome: Progressing     Problem: CARDIOVASCULAR - ADULT  Goal: Maintains optimal cardiac output and hemodynamic stability  Description: INTERVENTIONS:  - Monitor vital signs, rhythm, and trends  - Monitor for bleeding, hypotension and signs of decreased cardiac output  - Evaluate effectiveness of vasoactive medications to optimize hemodynamic stability  - Monitor arterial and/or venous puncture sites for bleeding and/or hematoma  - Assess quality of pulses, skin color and temperature  - Assess for signs of decreased coronary artery perfusion - ex. Angina  - Evaluate fluid balance, assess for edema, trend weights  Outcome: Progressing  Goal: Absence of cardiac arrhythmias or at baseline  Description: INTERVENTIONS:  - Continuous cardiac monitoring, monitor vital signs, obtain 12 lead EKG if indicated  - Evaluate effectiveness of  antiarrhythmic and heart rate control medications as ordered  - Initiate emergency measures for life threatening arrhythmias  - Monitor electrolytes and administer replacement therapy as ordered  Outcome: Progressing     Problem: RESPIRATORY - ADULT  Goal: Achieves optimal ventilation and oxygenation  Description: INTERVENTIONS:  - Assess for changes in respiratory status  - Assess for changes in mentation and behavior  - Position to facilitate oxygenation and minimize respiratory effort  - Oxygen supplementation based on oxygen saturation or ABGs  - Provide Smoking Cessation handout, if applicable  - Encourage broncho-pulmonary hygiene including cough, deep breathe, Incentive Spirometry  - Assess the need for suctioning and perform as needed  - Assess and instruct to report SOB or any respiratory difficulty  - Respiratory Therapy support as indicated  - Manage/alleviate anxiety  - Monitor for signs/symptoms of CO2 retention  Outcome: Progressing     Problem: Diabetes/Glucose Control  Goal: Glucose maintained within prescribed range  Description: INTERVENTIONS:  - Monitor Blood Glucose as ordered  - Assess for signs and symptoms of hyperglycemia and hypoglycemia  - Administer ordered medications to maintain glucose within target range  - Assess barriers to adequate nutritional intake and initiate nutrition consult as needed  - Instruct patient on self management of diabetes  Outcome: Progressing

## 2025-01-21 NOTE — CONSULTS
[facility]  [facility] Neurology Consult Note    Juan Pablo Lam Patient Status:  Inpatient    1960 MRN [id number]   Location [facility] 8NE-A Attending Mathew Grewal MD   Hosp Day # 6 PCP Parker Clancy MD     REASON FOR EVALUATION:  Left wrist drop    HISTORY OF PRESENT ILLNESS:  Mr. Lam is a 64-year-old man with coronary artery disease who is currently hospitalized after a coronary artery bypass grafting surgery performed 5 days ago.  Since waking up from surgery he has noticed a left wrist drop.  This has not improved in these last 5 days.  He does not perceive a sensory deficit in the left hand, only that he cannot lift the wrist against gravity.  He can still use his fingers and make a fist.  He has no other problems and specifically denies numbness weakness or any other deficits.  He does not have any pain in the wrist. Of note, he had an arterial line in his left wrist.    PAST MEDICAL HISTORY:  Past Medical History:    Coronary atherosclerosis    High cholesterol       PAST SURGICAL HISTORY:  Past Surgical History:   Procedure Laterality Date    [place] teeth removed             FAMILY HISTORY:  family history is not on file.    SOCIAL HISTORY:   reports that he has never smoked. He has never used smokeless tobacco. He reports current alcohol use. He reports that he does not use drugs.    ALLERGIES:  No Known Allergies    MEDICATIONS:  Prior to Admission Medications   Medication Sig    [START ON 2025] metoprolol succinate ER 50 MG Oral Tablet 24 Hr Take 1 tablet (50 mg total) by mouth Daily Beta Blocker.    rosuvastatin 40 MG Oral Tab Take 1 tablet (40 mg total) by mouth nightly.    HYDROcodone-acetaminophen 5-325 MG Oral Tab Take 1 tablet by mouth every 6 (six) hours as needed.     Current Facility-Administered Medications   Medication Dose Route Frequency    metoprolol succinate ER (Toprol XL) 24 hr tab 50 mg  50 mg Oral Daily Beta Blocker    calcium carbonate (Tums) chewable tab  500 mg  500 mg Oral TID PRN    multivitamin (Tab-A-Eder/Beta Carotene) tab 1 tablet  1 tablet Oral Daily    guaiFENesin ER (Mucinex) 12 hr tab 1,200 mg  1,200 mg Oral BID    acetaminophen (Tylenol Extra Strength) tab 1,000 mg  1,000 mg Oral Q6H PRN    insulin aspart (NovoLOG) 100 Units/mL FlexPen 1-5 Units  1-5 Units Subcutaneous TID AC and HS    HYDROcodone-acetaminophen (Norco) 5-325 MG per tab 1 tablet  1 tablet Oral Q4H PRN    Or    HYDROcodone-acetaminophen (Norco) 5-325 MG per tab 2 tablet  2 tablet Oral Q4H PRN    rosuvastatin (Crestor) tab 40 mg  40 mg Oral Nightly    ipratropium-albuterol (Duoneb) 0.5-2.5 (3) MG/3ML inhalation solution 3 mL  3 mL Nebulization Q4H PRN    aspirin chewable tab 81 mg  81 mg Oral Daily    sodium chloride 0.9% infusion   Intravenous Continuous    melatonin tab 3 mg  3 mg Oral Nightly PRN    sennosides (Senokot) tab 8.6 mg  8.6 mg Oral BID    docusate sodium (Colace) cap 100 mg  100 mg Oral BID    polyethylene glycol (PEG 3350) (Miralax) 17 g oral packet 17 g  17 g Oral Daily PRN    bisacodyl (Dulcolax) 10 MG rectal suppository 10 mg  10 mg Rectal Daily PRN    ondansetron (Zofran) 4 MG/2ML injection 4 mg  4 mg Intravenous Q6H PRN    morphINE PF 2 MG/ML injection 2 mg  2 mg Intravenous Q2H PRN    Or    morphINE PF 4 MG/ML injection 4 mg  4 mg Intravenous Q2H PRN    pantoprazole (Protonix) 40 mg in sodium chloride 0.9% PF 10 mL IV push  40 mg Intravenous QAM AC    Or    pantoprazole (Protonix) DR tab 40 mg  40 mg Oral QAM AC    glucose (Dex4) 15 GM/59ML oral liquid 15 g  15 g Oral Q15 Min PRN    Or    glucose (Glutose) 40% oral gel 15 g  15 g Oral Q15 Min PRN    Or    glucose-vitamin C (Dex-4) chewable tab 4 tablet  4 tablet Oral Q15 Min PRN    Or    dextrose 50% injection 50 mL  50 mL Intravenous Q15 Min PRN    Or    glucose (Dex4) 15 GM/59ML oral liquid 30 g  30 g Oral Q15 Min PRN    Or    glucose (Glutose) 40% oral gel 30 g  30 g Oral Q15 Min PRN    Or    glucose-vitamin C  (Dex-4) chewable tab 8 tablet  8 tablet Oral Q15 Min PRN    cetirizine (ZyrTEC) tab 10 mg  10 mg Oral Daily       REVIEW OF SYSTEMS:  A 10-point system was reviewed.  Pertinent positives and negatives are noted in HPI.      PHYSICAL EXAMINATION:  VITAL SIGNS: /67 (BP Location: Left arm)   Pulse 111   Temp 98.1 °F (36.7 °C) (Oral)   Resp 17   Ht 70\"   Wt 210 lb 15.7 oz (95.7 kg)   SpO2 94%   BMI 30.27 kg/m²   GENERAL:  Patient is a 64 year old male in no acute distress.  HEART:  Regular rate and rhythm.  SKIN: Warm, dry, no rashes  PSYCH: Normal mood, behavior, affect    NEUROLOGICAL:   Mental status: Oriented to person, place, and time  Speech & Language: Fluent, no dysarthria  Comprehension: Intact  Cranial Nerves: VFF, PERRL, EOMI, no nystagmus, facial sensation intact, face symmetric, tongue midline, shoulder shrug equal  Motor: tone, bulk, strength are normal in all flexors and extensors except the left wrist and finger extensors where he has only trace activation.   Sensory: Intact to light touch in all limbs, including over the dorsum of the left hand  Coordination: FTN intact  Tendon Reflexes: normal for habitus, no asymmetry, including brachioradialis on the left  Gait: Deferred    DIAGNOSTIC DATA:  Labs:  Recent Labs   Lab 01/17/25  0319 01/18/25  0340 01/19/25  0440 01/20/25  0558 01/21/25  0618   RBC 3.18*   < > 2.76* 3.13* 3.72*   HGB 9.9*   < > 8.7* 9.9* 11.6*   HCT 28.1*   < > 25.1* 28.7* 34.4*   MCV 88.4   < > 90.9 91.7 92.5   MCH 31.1   < > 31.5 31.6 31.2   MCHC 35.2   < > 34.7 34.5 33.7   RDW 12.9   < > 13.6 13.5 13.5   NEPRELIM 12.44*  --   --   --   --    WBC 13.8*   < > 14.5* 11.4* 12.3*   .0   < > 156.0 211.0 296.0    < > = values in this interval not displayed.         Recent Labs   Lab 01/19/25  0440 01/19/25  1653 01/20/25  0558 01/20/25 2023 01/21/25  0618   *  --  116*  --  123*   BUN 23  --  27*  --  28*   CREATSERUM 0.86  --  0.93  --  1.00   EGFRCR 95  --  91   --  83   CA 8.6*  --  9.1  --  9.6     --  141  --  142   K 3.6   < > 3.5 4.1 4.0     --  106  --  107   CO2 28.0  --  27.0  --  26.0    < > = values in this interval not displayed.         IMAGING:  XR CHEST AP PORTABLE  (CPT=71045)    Result Date: 2025  CONCLUSION:  Interval removal of lines and tubes.  No acute cardiopulmonary disease.   LOCATION:  [facility]      Dictated by (CST): Lisa Rowley DO on 2025 at 3:33 PM     Finalized by (CST): Lisa Rowley DO on 2025 at 3:34 PM       XR CHEST AP PORTABLE  (CPT=71045)    Result Date: 2025  CONCLUSION:  No significant interval change.   LOCATION:  [id number]      Dictated by (CST): Evan Shah MD on 2025 at 7:02 AM     Finalized by (CST): Evan Shah MD on 2025 at 7:03 AM       XR CHEST AP PORTABLE  (CPT=71045)    Result Date: 2025  CONCLUSION:     1. Endotracheal tube 4.6 cm above the chronic, nasogastric tube present tip in the proximal stomach.  Left chest tube, mediastinal drain.  [place] catheter tip in the right main pulmonary outflow.  2. Mild vascular congestion without overt CHF.  Mild basilar atelectasis.  No sign of pneumothorax.   LOCATION:  [facility]      Dictated by (CST): Anthony Garcia MD on 2025 at 6:52 PM     Finalized by (CST): Anthony Garcia MD on 2025 at 6:53 PM       XR CHEST AP PORTABLE  (CPT=71045)    Result Date: 1/15/2025  CONCLUSION:  Normal cardiac and mediastinal contours.  No pulmonary edema or focal airspace consolidation.  The pleural spaces are clear.   LOCATION:  [id number]      Dictated by (CST): North Contreras MD on 1/15/2025 at 6:21 PM     Finalized by (CST): North Contreras MD on 1/15/2025 at 6:22 PM       CARD ECHO 2D DOPPLER CONTRAST (CPT=93306)    Result Date: 2025  Transthoracic Echocardiogram Name:Juan Pablo Lam Date: 2025 :  1960 Ht:  (70in)  BP: 140 / 67 MRN:  [id number]    Age:  64years    Wt:  (225lb) HR: 62bpm Loc:  [facility]       Gndr: M          BSA:  2.19m^2 Sonographer: FISH Bello Ordering:    Mathew Grewal Consulting:  Jese Morales Referring:   Mathew Grewal ---------------------------------------------------------------------------- History/Indications:   Coronary artery disease. Pre CABG. ---------------------------------------------------------------------------- Procedure information:  A transthoracic complete 2D study was performed. Additional evaluation included M-mode, complete spectral Doppler, and color Doppler.  Patient status:  Inpatient.  Location:  Room [facility].    No prior study was available for comparison.    This was a routine study. Transthoracic echocardiography for ventricular function evaluation and assessment of valvular function. Image quality was suboptimal. The study was technically limited due to poor acoustic window availability. Intravenous contrast (Definity) was administered to opacify the LV. ECG rhythm:   Normal sinus ---------------------------------------------------------------------------- Conclusions: 1. Left ventricle: The cavity size was normal. Wall thickness was at the    upper limits of normal. Systolic function was normal. The estimated    ejection fraction was 60-65%, by visual assessment. No diagnostic    evidence for regional wall motion abnormalities. Left ventricular    diastolic function parameters were normal. 2. Right ventricle: Systolic function was normal. The RV pressure during    systole is 34mm Hg. 3. Left atrium: The left atrial volume was upper normal. 4. Aortic valve: The valve was trileaflet. The leaflets were mildly    thickened. There was mild regurgitation. Impressions:  No previous study was available for comparison. * ---------------------------------------------------------------------------- * Findings: Left ventricle:  The cavity size was normal. Wall thickness was at the upper limits of normal. Systolic function was normal. The estimated ejection fraction was 60-65%, by visual assessment. No  diagnostic evidence for regional wall motion abnormalities. Left ventricular diastolic function parameters were normal. Left atrium:  The left atrial volume was upper normal. Right ventricle:  The cavity size was normal. Systolic function was normal. Right atrium:  The atrium was normal in size. Mitral valve:  The valve was structurally normal. Leaflet separation was normal.  Doppler:  Transvalvular velocity was within the normal range. There was no evidence for stenosis. There was trivial regurgitation. Aortic valve:   The valve was trileaflet. The leaflets were mildly thickened. Cusp separation was normal.  Doppler:  Transvalvular velocity was within the normal range. There was no evidence for stenosis. There was mild regurgitation. Tricuspid valve:  The valve is structurally normal. Leaflet separation was normal.  Doppler:  Transvalvular velocity was within the normal range. There was no evidence for stenosis. There was trivial regurgitation. Pulmonic valve:   Not well visualized.  Doppler:  Transvalvular velocity was within the normal range. There was no evidence for stenosis. There was no significant regurgitation. Pericardium:   There was no pericardial effusion. Aorta: Aortic root: The aortic root was normal. Ascending aorta: The ascending aorta was normal. Pulmonary arteries: The main pulmonary artery was normal-sized. Systolic pressure was at the upper limits of normal to, at most, mildly increased; in the range of 30 mm Hg to 35 mm Hg, in the range of 35mm Hg to 40mm Hg. Systemic veins: Inferior vena cava: The IVC was poorly visualized. ---------------------------------------------------------------------------- Measurements  Left ventricle                    Value        Ref  LV end-diastolic volume, 3D       151   ml     ---------  LV end-systolic volume, 3D        61    ml     ---------  LV ejection fraction, 3D          60    %      52 - 72  LV end-diastolic volume/bsa,      69    ml/m^2 <=79  3D  LV  end-systolic volume/bsa,       28    ml/m^2 <=32  3D  LV wall mass, 3D                  168   g      ---------  LV wall mass/bsa, 3D              77    g/m^2  ---------  IVS thickness, ED, PLAX           1.0   cm     0.6 - 1.0  LV ID, ED, PLAX                   4.7   cm     4.2 - 5.8  LV ID, ES, PLAX                   3.0   cm     2.5 - 4.0  LV PW thickness, ED, PLAX         0.8   cm     0.6 - 1.0  IVS/LV PW ratio, ED, PLAX         1.26         ---------  LV PW/LV ID ratio, ED, PLAX       0.17         ---------  LV ejection fraction              65    %      52 - 72  LV e', lateral                    10.5  cm/sec >=10.0  LV E/e', lateral                  3            <=13  LV e', medial                 (L) 6.4   cm/sec >=7.0  LV E/e', medial                   5            ---------  LV e', average                    8.5   cm/sec ---------  LV E/e', average                  1            <=14  Aortic root                       Value        Ref  Aortic root ID, ED                3.7   cm     2.8 - 4.3  Ascending aorta                   Value        Ref  Ascending aorta ID, A-P, ED       3.2   cm     2.2 - 3.8  Left atrium                       Value        Ref  LA ID, A-P, ES                (H) 4.5   cm     3.0 - 4.0  LA volume, S                  (H) 75    ml     18 - 58  LA volume/bsa, S                  34    ml/m^2 16 - 34  LA volume, ES, 1-p A4C        (H) 69    ml     18 - 58  LA volume, ES, 1-p A2C        (H) 76    ml     18 - 58  LA volume, ES, A/L                81    ml     ---------  LA volume/bsa, ES, A/L        (H) 37    ml/m^2 16 - 34  LA/aortic root ratio              1.22         ---------  LA volume, ES, 3D             (H) 62    ml     18 - 58  LA volume/bsa, ES, 3D             28    ml/m^2 ---------  Mitral valve                      Value        Ref  Mitral E-wave peak velocity       0.07  m/sec  ---------  Mitral A-wave peak velocity       0.52  m/sec  ---------  Mitral E/A ratio, peak             0.7          ---------  Pulmonary artery                  Value        Ref  PA pressure, S, DP                34    mm Hg  ---------  Tricuspid valve                   Value        Ref  Tricuspid regurg peak             2.39  m/sec  <=2.8  velocity  Tricuspid peak RV-RA gradient     29    mm Hg  ---------  Systemic veins                    Value        Ref  Estimated CVP                     5     mm Hg  ---------  Right ventricle                   Value        Ref  TAPSE, 2D                         2.29  cm     >=1.70  RV pressure, S, DP                34    mm Hg  --------- Legend: (L)  and  (H)  lucy values outside specified reference range. ---------------------------------------------------------------------------- Prepared and electronically signed by Mathew Cano 01/16/2025 11:24       ASSESSMENT:  Mr. 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    PLAN:  Active Problems:    Abnormal stress test  We discussed how he most likely has a radial nerve palsy but that this would be clinically indistinguishable from a particular cortical stroke, for which he was also at risk due to the recent surgery.  Functionally, he should wear a left wrist brace and see how this progresses over time.  If he fails to make progress toward normalcy in the coming week or 2 then nerve conduction studies would be of use to ascertain the degree of nerve injury.  We discussed how MRI brain would be able to discern whether or not he had a recent acute infarct, but would not functionally  and would not initiate a further cascade of testing for a causative agent since we would already know why he would have experienced an embolic stroke, and would be purely to more clearly differentiate if this was cerebral or peripheral, and is not mandatory from my perspective.   I did mention that he would likely have to stay in the hospital, at least for another day if not longer, to pursue MRI brain.  He and his wife want to think on this.  Either way I will arrange for prompt outpatient follow-up.  If he stays for MRI brain we will follow up on those results.    Jonathan Henderson MD

## 2025-01-22 ENCOUNTER — APPOINTMENT (OUTPATIENT)
Dept: MRI IMAGING | Facility: HOSPITAL | Age: 65
End: 2025-01-22
Payer: COMMERCIAL

## 2025-01-22 VITALS
HEIGHT: 70 IN | BODY MASS INDEX: 29.92 KG/M2 | RESPIRATION RATE: 20 BRPM | SYSTOLIC BLOOD PRESSURE: 155 MMHG | OXYGEN SATURATION: 95 % | TEMPERATURE: 98 F | DIASTOLIC BLOOD PRESSURE: 70 MMHG | WEIGHT: 209 LBS | HEART RATE: 92 BPM

## 2025-01-22 PROBLEM — M21.332 LEFT WRIST DROP: Status: ACTIVE | Noted: 2025-01-22

## 2025-01-22 LAB
GLUCOSE BLD-MCNC: 103 MG/DL (ref 70–99)
GLUCOSE BLD-MCNC: 118 MG/DL (ref 70–99)
GLUCOSE BLD-MCNC: 118 MG/DL (ref 70–99)

## 2025-01-22 PROCEDURE — 99232 SBSQ HOSP IP/OBS MODERATE 35: CPT

## 2025-01-22 PROCEDURE — 70551 MRI BRAIN STEM W/O DYE: CPT

## 2025-01-22 RX ADMIN — CETIRIZINE HYDROCHLORIDE 10 MG: 10 TABLET ORAL at 09:24:00

## 2025-01-22 RX ADMIN — DOCUSATE SODIUM 100 MG: 100 CAPSULE, LIQUID FILLED ORAL at 09:23:00

## 2025-01-22 RX ADMIN — SENNOSIDES 8.6 MG: 8.6 MG TABLET ORAL at 09:24:00

## 2025-01-22 RX ADMIN — DOXEPIN HYDROCHLORIDE 1 TABLET: 50 CAPSULE ORAL at 09:23:00

## 2025-01-22 RX ADMIN — METOPROLOL SUCCINATE 50 MG: 50 TABLET, EXTENDED RELEASE ORAL at 06:55:00

## 2025-01-22 RX ADMIN — PANTOPRAZOLE SODIUM 40 MG: 40 TABLET, DELAYED RELEASE ORAL at 06:55:00

## 2025-01-22 RX ADMIN — ASPIRIN 81 MG: 81 TABLET, CHEWABLE ORAL at 09:23:00

## 2025-01-22 RX ADMIN — GUAIFENESIN 1200 MG: 600 TABLET, EXTENDED RELEASE ORAL at 09:24:00

## 2025-01-22 NOTE — PROGRESS NOTES
[facility]   part of [facility]     CV Surgery Progress Note    Juan Pablo Lam Patient Status:  Inpatient    1960 MRN [id number]   Location [facility] 6NE-A Attending Mathew Grewal MD   Hosp Day # 7 PCP Parker Clancy MD     Subjective:  No issues overnight. Awaiting MRI.       Objective:  /63 (BP Location: Right arm)   Pulse 100   Temp 98.4 °F (36.9 °C) (Oral)   Resp 19   Ht 5' 10\" (1.778 m)   Wt 208 lb 15.9 oz (94.8 kg)   SpO2 93%   BMI 29.99 kg/m²     Intake/Output:    Intake/Output Summary (Last 24 hours) at 2025 1230  Last data filed at 2025 1203  Gross per 24 hour   Intake 1494 ml   Output 1600 ml   Net -106 ml       Labs:            Lab Results   Component Value Date    INR 0.74 (L) 2025    INR 1.49 (H) 2025    INR 1.05 01/15/2025         Physical Exam:  General: VSS, A&Ox3, In NAD  Neck: No JVD.  Lungs: mildly diminished at bases   Heart: S1,S2 RRR. Sternum Stable   Abdomen: Soft, non-tender  Extremities: Warm, dry, no edema  Skin: sternotomy incision C/D/I, LE incision C/D/I  Neuro: L wrist drop     Assessment/Plan:   S/P CABGx2 with LIMA-LAD, SVG-OM POD#6  -HD stable, off support  -Left wrist drop, appreciate neuro consult/MRI today   -Acute post op blood loss anemia, expected, improved- monitor   -Mild vol OL, resolved based on physical exam   -Renal function intact, great UOP- monitor   -Bowel regimen   -Pain management, prn norco   -Chest tubes and PW removed   -GI PPX: protonix   -DVT PPX: TEDs/SCDs  -Encourage IS/ambulation   -PT/OT/Cardiac rehab   -Ok to discharge home later today after MRI from surgical standpoint      -D/W Dr. Coffman/Tania Celeste PA-C   Cardiothoracic Surgery   2025  12:30 PM

## 2025-01-22 NOTE — PROGRESS NOTES
[facility] Hospitalist Progress Note     CC: Hospital Follow up    PCP: Parker Clancy MD       Subjective:     Patient seen and examined.  Some tingling in his L hand digits.  Denies CP/SOB.  NAD.     OBJECTIVE:    Blood pressure 155/70, pulse 92, temperature 98.4 °F (36.9 °C), temperature source Oral, resp. rate 20, height 5' 10\" (1.778 m), weight 208 lb 15.9 oz (94.8 kg), SpO2 95%.    Temp:  [98 °F (36.7 °C)-98.4 °F (36.9 °C)] 98.4 °F (36.9 °C)  Pulse:  [] 92  Resp:  [16-29] 20  BP: (135-155)/(63-82) 155/70  SpO2:  [90 %-96 %] 95 %      Intake/Output:    Intake/Output Summary (Last 24 hours) at 1/22/2025 1652  Last data filed at 1/22/2025 1603  Gross per 24 hour   Intake 1134 ml   Output 1000 ml   Net 134 ml       Last 3 Weights   01/22/25 0602 208 lb 15.9 oz (94.8 kg)   01/21/25 0600 210 lb 15.7 oz (95.7 kg)   01/20/25 0525 217 lb 6 oz (98.6 kg)   01/19/25 0532 222 lb 3.6 oz (100.8 kg)   01/18/25 0600 228 lb 9.6 oz (103.7 kg)   01/17/25 0500 227 lb 8.2 oz (103.2 kg)   01/16/25 0530 225 lb 1.6 oz (102.1 kg)   01/15/25 1743 225 lb (102.1 kg)   01/13/25 0842 227 lb (103 kg)   06/16/22 1207 222 lb (100.7 kg)       Exam   Gen: Alert, no acute distress  Heent: Normocephalic, atraumatic, neck supple, EOMI, PERRLA  Pulm: Lungs CTAB, normal respiratory effort  CV:  Regular rate and rhythm, no murmurs/rubs/gallops  Abd: Soft, nontender, nondistended, bowel sounds present  Extremities: No peripheral edema, no clubbing, pulses intact   Skin: No rashes or lesions  Neuro: AOx3, no focal neurologic deficits, CN II-XII grossly intact, L wrist drop  Psych: appropriate mood and affect       Data Review:       Labs:     Recent Labs   Lab 01/17/25  0319 01/18/25  0340 01/19/25  0440 01/20/25  0558 01/21/25  0618   RBC 3.18*   < > 2.76* 3.13* 3.72*   HGB 9.9*   < > 8.7* 9.9* 11.6*   HCT 28.1*   < > 25.1* 28.7* 34.4*   MCV 88.4   < > 90.9 91.7 92.5   MCH 31.1   < > 31.5 31.6 31.2   MCHC 35.2   < > 34.7 34.5  33.7   RDW 12.9   < > 13.6 13.5 13.5   NEPRELIM 12.44*  --   --   --   --    WBC 13.8*   < > 14.5* 11.4* 12.3*   .0   < > 156.0 211.0 296.0    < > = values in this interval not displayed.         Recent Labs   Lab 01/19/25  0440 01/19/25  1653 01/20/25  0558 01/20/25 2023 01/21/25  0618   *  --  116*  --  123*   BUN 23  --  27*  --  28*   CREATSERUM 0.86  --  0.93  --  1.00   EGFRCR 95  --  91  --  83   CA 8.6*  --  9.1  --  9.6     --  141  --  142   K 3.6   < > 3.5 4.1 4.0     --  106  --  107   CO2 28.0  --  27.0  --  26.0    < > = values in this interval not displayed.       Recent Labs   Lab 01/15/25  1930 01/16/25  1010 01/17/25  0319 01/18/25  0340   ALT 35  --   --   --    AST 34*  --   --   --    ALB 4.4 4.6 3.5 4.0         Imaging:  MRI BRAIN (CPT=70551)    Result Date: 1/22/2025  CONCLUSION:   Overall unremarkable MRI brain examination.  No evidence of an acute infarct.  Minimal FLAIR abnormalities in the white matter may be sequelae of minimal chronic small vessel ischemic disease.  These can also be seen with migraine headaches.   LOCATION:  [facility]   Dictated by (CST): Triston Golden MD on 1/22/2025 at 3:08 PM     Finalized by (CURT): Triston Golden MD on 1/22/2025 at 3:09 PM          Meds:      metoprolol succinate  50 mg Oral Daily Beta Blocker    multivitamin  1 tablet Oral Daily    guaiFENesin ER  1,200 mg Oral BID    insulin aspart  1-5 Units Subcutaneous TID AC and HS    rosuvastatin  40 mg Oral Nightly    aspirin  81 mg Oral Daily    sennosides  8.6 mg Oral BID    docusate sodium  100 mg Oral BID    pantoprazole  40 mg Intravenous QAM AC    Or    pantoprazole  40 mg Oral QAM AC    cetirizine  10 mg Oral Daily      sodium chloride 10 mL/hr at 01/16/25 1735       calcium carbonate    acetaminophen    HYDROcodone-acetaminophen **OR** HYDROcodone-acetaminophen    ipratropium-albuterol    melatonin    polyethylene glycol (PEG 3350)    bisacodyl    ondansetron     morphINE **OR** morphINE    glucose **OR** glucose **OR** glucose-vitamin C **OR** dextrose **OR** glucose **OR** glucose **OR** glucose-vitamin C       Assessment/Plan:     64 year old male with a history of hyperlipidemia and CAD who underwent cardiac cath on 1/15 with Dr. Grewal showing critical ostial left main stenosis with good distal targets for bypass. Underwent successful bypass 1/16.      CAD with severe ostial left main stenosis s/p 2V CABG - LIMA LAD SVG OM (1/16/25)   -Tolerated procedure well  -Management per CT surgery, cardiology  -ASA, statin  -metoprolol started    -One time dose lasix, prn per cardiology- monitor I's/O's   -Chest tubes removed 1/17  -Off insulin ggt, transitioned to sliding scale for sugars - glucose levels stable  -PT/OT/cardiac rehab   -Monitor RFP - creatinine stable       Mild postop anemia  - stable     L wrist drop  - suspect radial nerve palsy  - neurology consulted - MRI negative for stroke  - will need L wrist brace and therapy as OP, OP neurology consult     Hyperlipidemia  -Statin     Dispo: Medically stable, dc per cardiology/CTS    Sarah Weiss MD  [facility]  Hospitalist  Contact via [employer]/Ozmott/Sticher    Supplementary Documentation:   DVT Mechanical Prophylaxis: GLORIA hose,      DVT Pharmacologic Prophylaxis   Medication   None      DVT Pharmacologic prophylaxis: Aspirin 162 mg         Code Status: Not on file  Corbett: No urinary catheter in place  Corbett Duration (in days): 1  Central line:    TAN: 1/22/2025

## 2025-01-22 NOTE — PROGRESS NOTES
[facility]  [facility] Neurology Progress Note    Juan Pablo Lam Patient Status:  Inpatient    1960 MRN [id number]   Location [facility] 8NE-A Attending Mathew Grewal MD   Hosp Day # 7 PCP Parker Clancy MD     CC: Left wrist drop    Subjective:  Juan Pablo Lam is a 64-year-old man with coronary artery disease who is currently hospitalized (admitted 1/15) and is status post  coronary artery bypass grafting surgery performed on 25.. Since waking up from surgery he has noticed a left wrist drop. This has not improved in these last 6 days. He does not perceive a sensory deficit in the left hand, only that he cannot lift the wrist against gravity.  He can still use his fingers and make a fist. He has no other problems and specifically denies numbness weakness or any other deficits.  He does not have any pain in the wrist. Of note, he had an arterial line in his left wrist. Neurology was consulted, MRI brain was ordered to rule out a cortical stroke.     Seen for a follow up visit today. Awaits MRI brain. No acute events overnight, feeling well, a/o x 4. Reports some tingling sensation in fingers that ia new, otherwise no other neurological deficits. Denies headache, no vision or speech disturbances.         MEDICATIONS:  Prior to Admission Medications   Medication Sig    metoprolol succinate ER 50 MG Oral Tablet 24 Hr Take 1 tablet (50 mg total) by mouth Daily Beta Blocker.    rosuvastatin 40 MG Oral Tab Take 1 tablet (40 mg total) by mouth nightly.    HYDROcodone-acetaminophen 5-325 MG Oral Tab Take 1 tablet by mouth every 6 (six) hours as needed.     Current Facility-Administered Medications   Medication Dose Route Frequency    metoprolol succinate ER (Toprol XL) 24 hr tab 50 mg  50 mg Oral Daily Beta Blocker    calcium carbonate (Tums) chewable tab 500 mg  500 mg Oral TID PRN    multivitamin (Tab-A-Eder/Beta Carotene) tab 1 tablet  1 tablet Oral Daily    guaiFENesin ER (Mucinex) 12 hr tab  1,200 mg  1,200 mg Oral BID    acetaminophen (Tylenol Extra Strength) tab 1,000 mg  1,000 mg Oral Q6H PRN    insulin aspart (NovoLOG) 100 Units/mL FlexPen 1-5 Units  1-5 Units Subcutaneous TID AC and HS    HYDROcodone-acetaminophen (Norco) 5-325 MG per tab 1 tablet  1 tablet Oral Q4H PRN    Or    HYDROcodone-acetaminophen (Norco) 5-325 MG per tab 2 tablet  2 tablet Oral Q4H PRN    rosuvastatin (Crestor) tab 40 mg  40 mg Oral Nightly    ipratropium-albuterol (Duoneb) 0.5-2.5 (3) MG/3ML inhalation solution 3 mL  3 mL Nebulization Q4H PRN    aspirin chewable tab 81 mg  81 mg Oral Daily    sodium chloride 0.9% infusion   Intravenous Continuous    melatonin tab 3 mg  3 mg Oral Nightly PRN    sennosides (Senokot) tab 8.6 mg  8.6 mg Oral BID    docusate sodium (Colace) cap 100 mg  100 mg Oral BID    polyethylene glycol (PEG 3350) (Miralax) 17 g oral packet 17 g  17 g Oral Daily PRN    bisacodyl (Dulcolax) 10 MG rectal suppository 10 mg  10 mg Rectal Daily PRN    ondansetron (Zofran) 4 MG/2ML injection 4 mg  4 mg Intravenous Q6H PRN    morphINE PF 2 MG/ML injection 2 mg  2 mg Intravenous Q2H PRN    Or    morphINE PF 4 MG/ML injection 4 mg  4 mg Intravenous Q2H PRN    pantoprazole (Protonix) 40 mg in sodium chloride 0.9% PF 10 mL IV push  40 mg Intravenous QAM AC    Or    pantoprazole (Protonix) DR tab 40 mg  40 mg Oral QAM AC    glucose (Dex4) 15 GM/59ML oral liquid 15 g  15 g Oral Q15 Min PRN    Or    glucose (Glutose) 40% oral gel 15 g  15 g Oral Q15 Min PRN    Or    glucose-vitamin C (Dex-4) chewable tab 4 tablet  4 tablet Oral Q15 Min PRN    Or    dextrose 50% injection 50 mL  50 mL Intravenous Q15 Min PRN    Or    glucose (Dex4) 15 GM/59ML oral liquid 30 g  30 g Oral Q15 Min PRN    Or    glucose (Glutose) 40% oral gel 30 g  30 g Oral Q15 Min PRN    Or    glucose-vitamin C (Dex-4) chewable tab 8 tablet  8 tablet Oral Q15 Min PRN    cetirizine (ZyrTEC) tab 10 mg  10 mg Oral Daily       REVIEW OF SYSTEMS:  A 10-point  system was reviewed.  Pertinent positives and negatives are noted in HPI.      PHYSICAL EXAMINATION:  VITAL SIGNS: /82 (BP Location: Left arm)   Pulse 99   Temp 98 °F (36.7 °C) (Oral)   Resp 20   Ht 70\"   Wt 208 lb 15.9 oz (94.8 kg)   SpO2 90%   BMI 29.99 kg/m²   GENERAL:  Patient is a 64 year old male in no acute distress.  HEENT:  Normocephalic, atraumatic  ABD: Soft, non tender  SKIN: Warm, dry, no rashes    NEUROLOGICAL:   Expand All Collapse [facility]  [facility] Neurology Consult Note           Juan Pablo aLm Patient Status:  Inpatient    1960 MRN [id number]   Location [facility] 8NE-A Attending Mathew Grewal MD   Hosp Day # 6 PCP Parker Clancy MD      REASON FOR EVALUATION:  Left wrist drop     HISTORY OF PRESENT ILLNESS:  Mr. Lam is a 64-year-old man with coronary artery disease who is currently hospitalized after a coronary artery bypass grafting surgery performed 5 days ago.  Since waking up from surgery he has noticed a left wrist drop.  This has not improved in these last 5 days.  He does not perceive a sensory deficit in the left hand, only that he cannot lift the wrist against gravity.  He can still use his fingers and make a fist.  He has no other problems and specifically denies numbness weakness or any other deficits.  He does not have any pain in the wrist. Of note, he had an arterial line in his left wrist.     PAST MEDICAL HISTORY:  Past Medical History       Past Medical History:    Coronary atherosclerosis    High cholesterol            PAST SURGICAL HISTORY:  Past Surgical History         Past Surgical History:   Procedure Laterality Date    [place] teeth removed                     FAMILY HISTORY:  family history is not on file.     SOCIAL HISTORY:   reports that he has never smoked. He has never used smokeless tobacco. He reports current alcohol use. He reports that he does not use drugs.     ALLERGIES:  Allergies   No Known Allergies         MEDICATIONS:  Prior to Admission Medications        Prior to Admission Medications   Medication Sig    [START ON 1/22/2025] metoprolol succinate ER 50 MG Oral Tablet 24 Hr Take 1 tablet (50 mg total) by mouth Daily Beta Blocker.    rosuvastatin 40 MG Oral Tab Take 1 tablet (40 mg total) by mouth nightly.    HYDROcodone-acetaminophen 5-325 MG Oral Tab Take 1 tablet by mouth every 6 (six) hours as needed.         Current Hospital Medications          Current Facility-Administered Medications   Medication Dose Route Frequency    metoprolol succinate ER (Toprol XL) 24 hr tab 50 mg  50 mg Oral Daily Beta Blocker    calcium carbonate (Tums) chewable tab 500 mg  500 mg Oral TID PRN    multivitamin (Tab-A-Eder/Beta Carotene) tab 1 tablet  1 tablet Oral Daily    guaiFENesin ER (Mucinex) 12 hr tab 1,200 mg  1,200 mg Oral BID    acetaminophen (Tylenol Extra Strength) tab 1,000 mg  1,000 mg Oral Q6H PRN    insulin aspart (NovoLOG) 100 Units/mL FlexPen 1-5 Units  1-5 Units Subcutaneous TID AC and HS    HYDROcodone-acetaminophen (Norco) 5-325 MG per tab 1 tablet  1 tablet Oral Q4H PRN     Or    HYDROcodone-acetaminophen (Norco) 5-325 MG per tab 2 tablet  2 tablet Oral Q4H PRN    rosuvastatin (Crestor) tab 40 mg  40 mg Oral Nightly    ipratropium-albuterol (Duoneb) 0.5-2.5 (3) MG/3ML inhalation solution 3 mL  3 mL Nebulization Q4H PRN    aspirin chewable tab 81 mg  81 mg Oral Daily    sodium chloride 0.9% infusion   Intravenous Continuous    melatonin tab 3 mg  3 mg Oral Nightly PRN    sennosides (Senokot) tab 8.6 mg  8.6 mg Oral BID    docusate sodium (Colace) cap 100 mg  100 mg Oral BID    polyethylene glycol (PEG 3350) (Miralax) 17 g oral packet 17 g  17 g Oral Daily PRN    bisacodyl (Dulcolax) 10 MG rectal suppository 10 mg  10 mg Rectal Daily PRN    ondansetron (Zofran) 4 MG/2ML injection 4 mg  4 mg Intravenous Q6H PRN    morphINE PF 2 MG/ML injection 2 mg  2 mg Intravenous Q2H PRN     Or    morphINE PF 4 MG/ML injection 4  mg  4 mg Intravenous Q2H PRN    pantoprazole (Protonix) 40 mg in sodium chloride 0.9% PF 10 mL IV push  40 mg Intravenous QAM AC     Or    pantoprazole (Protonix) DR tab 40 mg  40 mg Oral QAM AC    glucose (Dex4) 15 GM/59ML oral liquid 15 g  15 g Oral Q15 Min PRN     Or    glucose (Glutose) 40% oral gel 15 g  15 g Oral Q15 Min PRN     Or    glucose-vitamin C (Dex-4) chewable tab 4 tablet  4 tablet Oral Q15 Min PRN     Or    dextrose 50% injection 50 mL  50 mL Intravenous Q15 Min PRN     Or    glucose (Dex4) 15 GM/59ML oral liquid 30 g  30 g Oral Q15 Min PRN     Or    glucose (Glutose) 40% oral gel 30 g  30 g Oral Q15 Min PRN     Or    glucose-vitamin C (Dex-4) chewable tab 8 tablet  8 tablet Oral Q15 Min PRN    cetirizine (ZyrTEC) tab 10 mg  10 mg Oral Daily            REVIEW OF SYSTEMS:  A 10-point system was reviewed.  Pertinent positives and negatives are noted in HPI.       PHYSICAL EXAMINATION:  VITAL SIGNS: /67 (BP Location: Left arm)   Pulse 111   Temp 98.1 °F (36.7 °C) (Oral)   Resp 17   Ht 70\"   Wt 210 lb 15.7 oz (95.7 kg)   SpO2 94%   BMI 30.27 kg/m²   GENERAL:  Patient is a 64 year old male in no acute distress.  HEART:  Regular rate and rhythm.  SKIN: Warm, dry, no rashes  PSYCH: Normal mood, behavior, affect     NEUROLOGICAL:   Mental status: Oriented to person, place, and time  Speech & Language: Fluent, no dysarthria  Comprehension: Intact  Cranial Nerves: VFF, PERRL, EOMI, no nystagmus, facial sensation intact, face symmetric, tongue midline, shoulder shrug equal  Motor: tone, bulk, strength are normal in all flexors and extensors except the left wrist and finger extensors where he has only trace activation.   Sensory: Intact to light touch in all limbs, including over the dorsum of the left hand  Coordination: FTN intact  Tendon Reflexes: normal for habitus, no asymmetry, including brachioradialis on the left  Gait: Deferred         Imaging/Diagnostics:  XR CHEST AP PORTABLE   (CPT=71045)    Result Date: 1/17/2025  CONCLUSION:  Interval removal of lines and tubes.  No acute cardiopulmonary disease.   LOCATION:  [facility]      Dictated by (CST): Lisa Rowley DO on 1/17/2025 at 3:33 PM     Finalized by (CST): Lisa Rowley DO on 1/17/2025 at 3:34 PM       XR CHEST AP PORTABLE  (CPT=71045)    Result Date: 1/17/2025  CONCLUSION:  No significant interval change.   LOCATION:  [id number]      Dictated by (CST): Evan Shah MD on 1/17/2025 at 7:02 AM     Finalized by (CST): Evan Shah MD on 1/17/2025 at 7:03 AM       XR CHEST AP PORTABLE  (CPT=71045)    Result Date: 1/16/2025  CONCLUSION:     1. Endotracheal tube 4.6 cm above the chronic, nasogastric tube present tip in the proximal stomach.  Left chest tube, mediastinal drain.  [place]-Katie catheter tip in the right main pulmonary outflow.  2. Mild vascular congestion without overt CHF.  Mild basilar atelectasis.  No sign of pneumothorax.   LOCATION:  [facility]      Dictated by (CST): Anthony Garcia MD on 1/16/2025 at 6:52 PM     Finalized by (CST): Anthony Garcia MD on 1/16/2025 at 6:53 PM       XR CHEST AP PORTABLE  (CPT=71045)    Result Date: 1/15/2025  CONCLUSION:  Normal cardiac and mediastinal contours.  No pulmonary edema or focal airspace consolidation.  The pleural spaces are clear.   LOCATION:  [id number]      Dictated by (CST): North Contreras MD on 1/15/2025 at 6:21 PM     Finalized by (CST): North Contreras MD on 1/15/2025 at 6:22 PM          Labs:  Recent Labs   Lab 01/17/25  0319 01/18/25  0340 01/19/25  0440 01/20/25  0558 01/21/25  0618   RBC 3.18*   < > 2.76* 3.13* 3.72*   HGB 9.9*   < > 8.7* 9.9* 11.6*   HCT 28.1*   < > 25.1* 28.7* 34.4*   MCV 88.4   < > 90.9 91.7 92.5   MCH 31.1   < > 31.5 31.6 31.2   MCHC 35.2   < > 34.7 34.5 33.7   RDW 12.9   < > 13.6 13.5 13.5   NEPRELIM 12.44*  --   --   --   --    WBC 13.8*   < > 14.5* 11.4* 12.3*   .0   < > 156.0 211.0 296.0    < > = values in this interval not displayed.         Recent Labs    Lab 01/19/25  0440 01/19/25  1653 01/20/25  0558 01/20/25 2023 01/21/25  0618   *  --  116*  --  123*   BUN 23  --  27*  --  28*   CREATSERUM 0.86  --  0.93  --  1.00   EGFRCR 95  --  91  --  83   CA 8.6*  --  9.1  --  9.6     --  141  --  142   K 3.6   < > 3.5 4.1 4.0     --  106  --  107   CO2 28.0  --  27.0  --  26.0    < > = values in this interval not displayed.       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 Work up:  MRI brain - pending  Advised to wear a left wrist brace and see how this progresses over time. If he fails to make progress toward normalcy in the coming week or 2 then nerve conduction studies would be of use to ascertain the degree of nerve injury.   Case discussed with dr. Perkins , pending MRI brain results, will plan to see pt in clinic in 1-2 weeks if MRI brain is negative for acute stroke or other pathology explaining left wrist drop.   Abnormal stress test - now s/p CABG, cardiology managing  Discussed with pt and wife, on the phone. Will follow after MRI completed.   Is this a shared or split note between Advanced Practice Provider and Physician? Yes     Addendum: MRI samina completed and reviewed. No acute stroke or lesion. Pt may be discharged from Neuro stand point, will need a brace. Will arrange an appointment with dr. Perkins at the outpatient clinic in 1-2 weeks. Office will call the pt with day and time.   Alba Dudley [facility]  1/22/2025, 10:42 AM   [employer] # [id number]

## 2025-01-22 NOTE — PLAN OF CARE
Patient alert and oriented x 4. Up  ad nery. On RA. NSR on tele. Mid sternal and LLE incision site w/ steri-strips C/D/I and painted w/ Betadine. Continent of bowel and bladder. No complaints of pain, shortness of breath or chest pain/discomfort. Encouraged use of IS. POC : MRI brain, bowel regimen, IS/ambulation. Fall precautions in place. Call light within reach.    Problem: Patient/Family Goals  Goal: Patient/Family Long Term Goal  Description: Patient's Long Term Goal: Stay Healthy    Interventions:  - Resume home med regimen  -Follow up with providers  - See additional Care Plan goals for specific interventions  Outcome: Progressing  Goal: Patient/Family Short Term Goal  Description: Patient's Short Term Goal: Discharge from hospital    Interventions:   - CABG 1/16  - See additional Care Plan goals for specific interventions  Outcome: Progressing     Problem: CARDIOVASCULAR - ADULT  Goal: Maintains optimal cardiac output and hemodynamic stability  Description: INTERVENTIONS:  - Monitor vital signs, rhythm, and trends  - Monitor for bleeding, hypotension and signs of decreased cardiac output  - Evaluate effectiveness of vasoactive medications to optimize hemodynamic stability  - Monitor arterial and/or venous puncture sites for bleeding and/or hematoma  - Assess quality of pulses, skin color and temperature  - Assess for signs of decreased coronary artery perfusion - ex. Angina  - Evaluate fluid balance, assess for edema, trend weights  Outcome: Progressing  Goal: Absence of cardiac arrhythmias or at baseline  Description: INTERVENTIONS:  - Continuous cardiac monitoring, monitor vital signs, obtain 12 lead EKG if indicated  - Evaluate effectiveness of antiarrhythmic and heart rate control medications as ordered  - Initiate emergency measures for life threatening arrhythmias  - Monitor electrolytes and administer replacement therapy as ordered  Outcome: Progressing     Problem: RESPIRATORY - ADULT  Goal: Achieves  optimal ventilation and oxygenation  Description: INTERVENTIONS:  - Assess for changes in respiratory status  - Assess for changes in mentation and behavior  - Position to facilitate oxygenation and minimize respiratory effort  - Oxygen supplementation based on oxygen saturation or ABGs  - Provide Smoking Cessation handout, if applicable  - Encourage broncho-pulmonary hygiene including cough, deep breathe, Incentive Spirometry  - Assess the need for suctioning and perform as needed  - Assess and instruct to report SOB or any respiratory difficulty  - Respiratory Therapy support as indicated  - Manage/alleviate anxiety  - Monitor for signs/symptoms of CO2 retention  Outcome: Progressing     Problem: Diabetes/Glucose Control  Goal: Glucose maintained within prescribed range  Description: INTERVENTIONS:  - Monitor Blood Glucose as ordered  - Assess for signs and symptoms of hyperglycemia and hypoglycemia  - Administer ordered medications to maintain glucose within target range  - Assess barriers to adequate nutritional intake and initiate nutrition consult as needed  - Instruct patient on self management of diabetes  Outcome: Progressing

## 2025-01-22 NOTE — PLAN OF CARE
Assumed care at 0730. Pt alert, oriented x4. Oxygen saturation adequate on room air. Lung sounds diminished bilaterally. Tele: NSR. POD#6 s/p CABG. Midsternal incision and LLE incisions x2 with steri strips, approximated, open to air, painted with betadine. Reaching 1500 IS. Continent. Denies pain. Ambulating independently. Left wrist drop. Plan of care: MRI today, ambulate in halls, IS, discharge pending MRI results. Pt updated on plan of care. Questions answered.     Problem: Patient/Family Goals  Goal: Patient/Family Long Term Goal  Description: Patient's Long Term Goal: Stay Healthy    Interventions:  - Resume home med regimen  -Follow up with providers  - See additional Care Plan goals for specific interventions  Outcome: Progressing  Goal: Patient/Family Short Term Goal  Description: Patient's Short Term Goal: Discharge from hospital    Interventions:   - CABG 1/16  - See additional Care Plan goals for specific interventions  Outcome: Progressing     Problem: CARDIOVASCULAR - ADULT  Goal: Maintains optimal cardiac output and hemodynamic stability  Description: INTERVENTIONS:  - Monitor vital signs, rhythm, and trends  - Monitor for bleeding, hypotension and signs of decreased cardiac output  - Evaluate effectiveness of vasoactive medications to optimize hemodynamic stability  - Monitor arterial and/or venous puncture sites for bleeding and/or hematoma  - Assess quality of pulses, skin color and temperature  - Assess for signs of decreased coronary artery perfusion - ex. Angina  - Evaluate fluid balance, assess for edema, trend weights  Outcome: Progressing  Goal: Absence of cardiac arrhythmias or at baseline  Description: INTERVENTIONS:  - Continuous cardiac monitoring, monitor vital signs, obtain 12 lead EKG if indicated  - Evaluate effectiveness of antiarrhythmic and heart rate control medications as ordered  - Initiate emergency measures for life threatening arrhythmias  - Monitor electrolytes and  administer replacement therapy as ordered  Outcome: Progressing     Problem: RESPIRATORY - ADULT  Goal: Achieves optimal ventilation and oxygenation  Description: INTERVENTIONS:  - Assess for changes in respiratory status  - Assess for changes in mentation and behavior  - Position to facilitate oxygenation and minimize respiratory effort  - Oxygen supplementation based on oxygen saturation or ABGs  - Provide Smoking Cessation handout, if applicable  - Encourage broncho-pulmonary hygiene including cough, deep breathe, Incentive Spirometry  - Assess the need for suctioning and perform as needed  - Assess and instruct to report SOB or any respiratory difficulty  - Respiratory Therapy support as indicated  - Manage/alleviate anxiety  - Monitor for signs/symptoms of CO2 retention  Outcome: Progressing     Problem: Diabetes/Glucose Control  Goal: Glucose maintained within prescribed range  Description: INTERVENTIONS:  - Monitor Blood Glucose as ordered  - Assess for signs and symptoms of hyperglycemia and hypoglycemia  - Administer ordered medications to maintain glucose within target range  - Assess barriers to adequate nutritional intake and initiate nutrition consult as needed  - Instruct patient on self management of diabetes  Outcome: Progressing

## 2025-01-23 ENCOUNTER — OFFICE VISIT (OUTPATIENT)
Dept: NEUROLOGY | Facility: CLINIC | Age: 65
End: 2025-01-23
Payer: COMMERCIAL

## 2025-01-23 ENCOUNTER — TELEPHONE (OUTPATIENT)
Dept: NEUROLOGY | Facility: CLINIC | Age: 65
End: 2025-01-23

## 2025-01-23 VITALS
HEART RATE: 90 BPM | RESPIRATION RATE: 16 BRPM | OXYGEN SATURATION: 97 % | WEIGHT: 214 LBS | SYSTOLIC BLOOD PRESSURE: 130 MMHG | BODY MASS INDEX: 30.64 KG/M2 | HEIGHT: 70 IN | DIASTOLIC BLOOD PRESSURE: 60 MMHG

## 2025-01-23 DIAGNOSIS — M21.332 LEFT WRISTDROP: Primary | ICD-10-CM

## 2025-01-23 DIAGNOSIS — G54.0 BRACHIAL PLEXOPATHY: ICD-10-CM

## 2025-01-23 PROCEDURE — 99214 OFFICE O/P EST MOD 30 MIN: CPT | Performed by: OTHER

## 2025-01-23 PROCEDURE — 3008F BODY MASS INDEX DOCD: CPT | Performed by: OTHER

## 2025-01-23 PROCEDURE — 3078F DIAST BP <80 MM HG: CPT | Performed by: OTHER

## 2025-01-23 PROCEDURE — 3075F SYST BP GE 130 - 139MM HG: CPT | Performed by: OTHER

## 2025-01-23 NOTE — TELEPHONE ENCOUNTER
Tyson from [facility], wrist drop neuro consulted inpatient, asked me to see. Can add on at end of day today, please call

## 2025-01-23 NOTE — PROGRESS NOTES
[facility] [place]   Neurology    Juan Pablo Lam Patient Status:  No patient class for patient encounter    1960 MRN [id number]   Location [facility], [facility] PCP Parker Clancy MD              HPI:   Juan Pablo Lam is a(n) 64 year old male who presents at the request of Parker Clancy MD for evaluation of left wrist drop. Had CABG surgery one week ago, and when woke up, noted that he couldn't lift his left wrist or fingers. Fingers were also weak. Was noted to have decreased sensation in the middle and fourth finger, 3 days into symptoms, and yesterday noted tingling in those finger. Since today, has noticed that he can bend his left fingers more. No pain in left arm or shoulder.    Pertinent imaging and laboratory work-up:     Past Medical History:  Past Medical History:    Coronary atherosclerosis    High cholesterol        Past Surgical History:  Past Surgical History:   Procedure Laterality Date    [place] teeth removed             Family History:  family history is not on file.  No history of neuropathy    Social History:   reports that he has never smoked. He has never used smokeless tobacco. He reports current alcohol use. He reports that he does not use drugs.    Allergies:  Allergies[1]    MEDICATIONS:    Current Outpatient Medications:     metoprolol succinate ER 50 MG Oral Tablet 24 Hr, Take 1 tablet (50 mg total) by mouth Daily Beta Blocker., Disp: 90 tablet, Rfl: 3    rosuvastatin 40 MG Oral Tab, Take 1 tablet (40 mg total) by mouth nightly., Disp: 90 tablet, Rfl: 3    HYDROcodone-acetaminophen 5-325 MG Oral Tab, Take 1 tablet by mouth every 6 (six) hours as needed., Disp: 20 tablet, Rfl: 0    OMEGA-3 FATTY ACIDS OR, Take 300 mg by mouth daily., Disp: , Rfl:     Multiple Vitamins-Minerals (PX MENS MULTIVITAMINS OR), Take 1 tablet by mouth daily., Disp: , Rfl:     B Complex-C-Folic Acid Oral Tab, Take 1 tablet by mouth  daily., Disp: , Rfl:     MISC NATURAL PRODUCTS OR, Take by mouth daily. Green tea, Disp: , Rfl:     Cetirizine HCl (ZYRTEC) 10 MG Oral Chew Tab, Chew 1 tablet (10 mg total) by mouth daily., Disp: , Rfl:     Apoaequorin (PREVAGEN OR), Take by mouth daily. Regular strength, Disp: , Rfl:     aspirin 81 MG Oral Chew Tab, Chew 1 tablet (81 mg total) by mouth daily., Disp: , Rfl:       Review of Systems:   A comprehensive 10 point review of systems was completed.     Pertinent positives and negatives noted in the HPI.         PHYSICAL EXAM:   Neurological Exam  Vitals  Vitals:    01/23/25 1502   BP: 130/60   Pulse: 90   Resp: 16     General Appearance: Patient is a 64 year old male in no acute distress  Cardiac: Normal rate & regular rhythm  Skin: There are no rashes or other skin lesions.  Musculoskeletal: There is no scoliosis, or joint deformities  Neurologic examination:  Mental status: Patient is alert, attentive, and oriented x 3. Language is coherent and fluent without aphasia. Memory, comprehension and ability to follow commands were intact.   Cranial nerves II-XII: Optic discs were sharp. Pupils were round and reacted to light. Extraocular movements were full. There was no face, jaw, palate or tongue weakness or atrophy. Facial sensation was normal. Hearing was grossly intact. Shoulder shrug was normal.   Motor exam revealed normal muscle bulk and tone. No atrophy or fasciculations. Manual muscle testing revealed 0 in left WE, 1 in L FE, 3 in L DI and APB, 4 in WF, 4 in L supination, o/w 5/5  Deep tendon reflexes were 2 at the biceps, brachioradialis, triceps, knee jerk, and ankle jerk. Plantar responses were flexor bilaterally.   Sensory exam revealed decreased pp in the extensor side left forearm, and extensor hand and fingers, volar surface fine. Vibratory perception and proprioception were intact at the toes. Pinprick and temperature were normal. Romberg sign was absent.  Complex motor skills revealed normal  coordination. Finger-nose-finger intact.   Gait was narrow and stable, was able to walk on heels, toes and tandem without any difficulty.     ASSESSMENT/ACTIVE PROBLEM LIST:     Encounter Diagnoses   Name Primary?    Left wristdrop Yes    Brachial plexopathy        Discussion/Plan:  Probable medial and posterior brachial plexus stretch injury, vs proximal median, ulnar, and radial nerve stretch injury, sparing triceps. Patient also has weakness of the wrist flexors,dorsal interossei, and to a slight degree the finger flexors. Likely related to kyphosis and arm positioning during surgery  Obtain EMG  Start OT  Do passive range of motion exercises until starting OT, use brace only intermittently    Requested Prescriptions      No prescriptions requested or ordered in this encounter          We discussed in depth regarding the diagnosis, prognosis, treatment. The patient was given ample opportunity to ask questions. All questions and concerns were addressed. 32 minutes were spent on this encounter, which included obtaining and reviewing history, examining the patient, reviewing and interpreting results, building a treatment plan, discussing treatment options, discussing medication instructions, educating the patient/family/caregiver, and completing documentation.     Sobeida Perkins DO  Neuromuscular and General Neurology  [facility]             [1] No Known Allergies

## 2025-01-23 NOTE — TELEPHONE ENCOUNTER
Spoke with patient and added him on for today.    Future Appointments   Date Time Provider Department Center   1/23/2025  3:15 PM Bela Perkins DO [facility] [place]

## 2025-01-23 NOTE — PATIENT INSTRUCTIONS
Refill policies:    Allow 2-3 business days for refills; controlled substances may take longer.  Contact your pharmacy at least 5 days prior to running out of medication and have them send an electronic request or submit request through the “request refill” option in your [employer] account.  Refills are not addressed on weekends; covering physicians do not authorize routine medications on weekends.  No narcotics or controlled substances are refilled after noon on Fridays or by on call physicians.  By law, narcotics must be electronically prescribed.  A 30 day supply with no refills is the maximum allowed.  If your prescription is due for a refill, you may be due for a follow up appointment.  To best provide you care, patients receiving routine medications need to be seen at least once a year.  Patients receiving narcotic/controlled substance medications need to be seen at least once every 3 months.  In the event that your preferred pharmacy does not have the requested medication in stock (e.g. Backordered), it is your responsibility to find another pharmacy that has the requested medication available.  We will gladly send a new prescription to that pharmacy at your request.    Scheduling Tests:    If your physician has ordered radiology tests such as MRI or CT scans, please contact Central Scheduling at [phone] right away to schedule the test.  Once scheduled, the [facility] Centralized Referral Team will work with your insurance carrier to obtain pre-certification or prior authorization.  Depending on your insurance carrier, approval may take 3-10 days.  It is highly recommended patients assure they have received an authorization before having a test performed.  If test is done without insurance authorization, patient may be responsible for the entire amount billed.      Precertification and Prior Authorizations:  If your physician has recommended that you have a procedure or additional testing performed the [facility]  Centralized Referral Team will contact your insurance carrier to obtain pre-certification or prior authorization.    You are strongly encouraged to contact your insurance carrier to verify that your procedure/test has been approved and is a COVERED benefit.  Although the [facility] Centralized Referral Team does its due diligence, the insurance carrier gives the disclaimer that \"Although the procedure is authorized, this does not guarantee payment.\"    Ultimately the patient is responsible for payment.   Thank you for your understanding in this matter.  Paperwork Completion:  If you require FMLA or disability paperwork for your recovery, please make sure to either drop it off or have it faxed to our office at [phone]. Be sure the form has your name and date of birth on it.  The form will be faxed to our Forms Department and they will complete it for you.  There is a 25$ fee for all forms that need to be filled out.  Please be aware there is a 10-14 day turnaround time.  You will need to sign a release of information (NIXON) form if your paperwork does not come with one.  You may call the Forms Department with any questions at [phone].  Their fax number is [phone].

## 2025-01-23 NOTE — DISCHARGE SUMMARY
Signed       Expand All Collapse All    [facility] [place] [facility] Cardiology  Consultation Note              Juan Pablo Lam Patient Status:  Inpatient    1960 MRN [id number]   Location [facility] CARDIOVASCULAR SURGERY Attending Mathew Grewal MD   Hosp Day # 7 PCP Parker Clancy MD      Reason for consult: CAD     Events: No CP or SOB. L wrist drop persists. MRI pending     History of Present Illness:  Juan Pablo Lam is a 64 year old male who presented to [facility] on 1/15/2025 for elective cath. Had abnormal nuc stress. Chest pressure at high intensity exertion only, last few months. None at rest. Cath with ostial left main stenosis. .      Medications:  Current Hospital Medications   No current facility-administered medications for this encounter.            Past Medical History       Past Medical History:    Coronary atherosclerosis    High cholesterol            Past Surgical History         Past Surgical History:   Procedure Laterality Date    [place] teeth removed                     Family History  family history is not on file.     Social History   reports that he has never smoked. He has never used smokeless tobacco. He reports current alcohol use. He reports that he does not use drugs.      Allergies  [Allergies]    [Allergies]  No Known Allergies     Review of Systems:  As per HPI, otherwise 10 point ROS is negative in detail.     Physical Exam:  Blood pressure 155/70, pulse 92, temperature 98.4 °F (36.9 °C), temperature source Oral, resp. rate 20, height 70\", weight 208 lb 15.9 oz (94.8 kg), SpO2 95%.  Temp (24hrs), Av.3 °F (36.8 °C), Min:98 °F (36.7 °C), Max:98.4 °F (36.9 °C)         Wt Readings from Last 3 Encounters:   25 208 lb 15.9 oz (94.8 kg)   22 222 lb (100.7 kg)         General: Awake and alert; in no acute distress  HEENT: Extraocular movements are intact; sclerae are anicteric; scalp is atraumatic  Neck: Supple; no JVD; no carotid bruits  Cardiac:  Regular rate and regular rhythm; normal S1 and S2, no murmurs, rubs, or gallops are appreciated  Lungs: Clear to auscultation bilaterally; no accessory muscle use is noted, no wheezes, rhonci or rales  Abdomen: Soft, non-distended, non-tender; bowel sounds are normoactive  Extremities: Warm, no edema, clubbing or cyanosis; moves all 4 extremities normally, distal pulses intact and equal  Psychiatric: Normal mood and affect; answers questions appropriately  Dermatologic: No rashes; normal skin turgor  L wrist drop     Diagnostic testing:     Labs:         Lab Results   Component Value Date     INR 0.74 (L) 01/16/2025     INR 1.49 (H) 01/16/2025            Lab Results   Component Value Date     PGLU 118 01/22/2025         Cardiac diagnostics:     Telemetry: Predominantly sinus rhythm, no malignant arrhythmias      Impression:  64 year old male with stable angina but severe ostial left main stenosis.   S/p 2V CABG: LIMA LAD SVG OM (1/16/25)  HTN  L wrist drop, likely ulnar n. neuropathy post surgery     Recommendations:  ASA, statin  Toprol 50mg daily  Appears euvolemic, on RA. Stop lasix at dc.   PT/OT.   Neuro eval. Brain MRI pending.   Otherwise stable from cardiac standpoint for dc     Hospital course:  Admitted after elective cath showing left main stenosis. Underwent 2V CABG. Post op with L wrist drop, evaluated by neuro. Brain MRI unremarkable. Felt to be radial nerve palsy. Recommended brace, PT/OT. Stable at discharge.

## 2025-01-23 NOTE — PAYOR COMM NOTE
CONTINUED STAY REVIEW    Payor: [employer]  Subscriber #:  [id number]  Authorization Number: [id number]    Admit date: 1/15/25  Admit time:  5:19 PM    REVIEW DOCUMENTATION:          1/21 IM          Subjective:      Patient seen and examined.  L wrist drop is concerning for him. Waiting to d/w neurology.  Denies CP/SOB.  NAD.      OBJECTIVE:     Blood pressure 130/67, pulse 80, temperature 97.9 °F (36.6 °C), temperature source Oral, resp. rate 19, height 5' 10\" (1.778 m), weight 210 lb 15.7 oz (95.7 kg), SpO2 90%.     Temp:  [97.9 °F (36.6 °C)-98.8 °F (37.1 °C)] 97.9 °F (36.6 °C)  Pulse:  [] 80  Resp:  [17-20] 19  BP: (118-155)/(50-78) 130/67  SpO2:  [90 %-100 %] 90 %        Assessment/Plan:      64 year old male with a history of hyperlipidemia and CAD who underwent cardiac cath on 1/15 with Dr. Grewal showing critical ostial left main stenosis with good distal targets for bypass. Underwent successful bypass 1/16.      CAD with severe ostial left main stenosis s/p 2V CABG - LIMA LAD SVG OM (1/16/25)   -Tolerated procedure well  -Management per CT surgery, cardiology  -ASA, statin  -metoprolol started    -One time dose lasix, prn per cardiology- monitor I's/O's   -Chest tubes removed 1/17  -Off insulin ggt, transitioned to sliding scale for sugars - glucose levels stable  -PT/OT/cardiac rehab   -Monitor RFP - creatinine stable       Mild postop anemia  - stable      L wrist drop  - suspect ulnar nerve injury?   - neurology consulted     Hyperlipidemia  -Statin     Dispo: Medically stable, dc per cardiology/CTS     Sarah Weiss MD  [facility]  Hospitalist  Contact via [employer]             1/21 Cardiology      Reason for consult: CAD     Events: No CP or SOB. L wrist drop persists.      History of Present Illness:  Juan Pablo Lam is a 64 year old male who presented to [facility] on 1/15/2025 for elective cath. Had abnormal nuc stress. Chest pressure at high intensity  exertion only, last few months. None at rest. Cath with ostial left main stenosis. .        Cardiac diagnostics:     Telemetry: Predominantly sinus rhythm, no malignant arrhythmias      Impression:  64 year old male with stable angina but severe ostial left main stenosis.   S/p 2V CABG: LIMA LAD SVG OM (1/16/25)  HTN  L wrist drop, likely ulnar n. neuropathy post surgery     Recommendations:  ASA, statin  Toprol 50mg daily  Appears euvolemic, on RA. Stop lasix at dc.   Pacer wires out per CTS.   PT/OT. Consider evaluation by hand specialist, neuro    1/21 consult Neurology      REASON FOR EVALUATION:  Left wrist drop     HISTORY OF PRESENT ILLNESS:  Mr. Lam is a 64-year-old man with coronary artery disease who is currently hospitalized after a coronary artery bypass grafting surgery performed 5 days ago.  Since waking up from surgery he has noticed a left wrist drop.  This has not improved in these last 5 days.  He does not perceive a sensory deficit in the left hand, only that he cannot lift the wrist against gravity.  He can still use his fingers and make a fist.  He has no other problems and specifically denies numbness weakness or any other deficits.  He does not have any pain in the wrist. Of note, he had an arterial line in his left wrist.     PHYSICAL EXAMINATION:  VITAL SIGNS: /67 (BP Location: Left arm)   Pulse 111   Temp 98.1 °F (36.7 °C) (Oral)   Resp 17   Ht 70\"   Wt 210 lb 15.7 oz (95.7 kg)   SpO2 94%   BMI 30.27 kg/m²   GENERAL:  Patient is a 64 year old male in no acute distress.  HEART:  Regular rate and rhythm.  SKIN: Warm, dry, no rashes  PSYCH: Normal mood, behavior, affect     NEUROLOGICAL:   Mental status: Oriented to person, place, and time  Speech & Language: Fluent, no dysarthria  Comprehension: Intact  Cranial Nerves: VFF, PERRL, EOMI, no nystagmus, facial sensation intact, face symmetric, tongue midline, shoulder shrug equal  Motor: tone, bulk, strength are normal in all  flexors and extensors except the left wrist and finger extensors where he has only trace activation.   Sensory: Intact to light touch in all limbs, including over the dorsum of the left hand  Coordination: FTN intact  Tendon Reflexes: normal for habitus, no asymmetry, including brachioradialis on the left  Gait: Deferred      ASSESSMENT:  Mr. 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     PLAN:  Active Problems:    Abnormal stress test  We discussed how he most likely has a radial nerve palsy but that this would be clinically indistinguishable from a particular cortical stroke, for which he was also at risk due to the recent surgery.  Functionally, he should wear a left wrist brace and see how this progresses over time.  If he fails to make progress toward normalcy in the coming week or 2 then nerve conduction studies would be of use to ascertain the degree of nerve injury.  We discussed how MRI brain would be able to discern whether or not he had a recent acute infarct, but would not functionally  and would not initiate a further cascade of testing for a causative agent since we would already know why he would have experienced an embolic stroke, and would be purely to more clearly differentiate if this was cerebral or peripheral, and is not mandatory from my perspective.  I did mention that he would likely have to stay in the hospital, at least for another day if not longer, to pursue MRI brain.  He and his wife want to think on this.  Either way I will arrange for prompt outpatient follow-up.  If he stays for MRI brain we will follow up on those results.              MEDICATIONS ADMINISTERED IN LAST 1 DAY:  aspirin chewable tab 81 mg       Date Action Dose Route User    Discharged on 1/22/2025 1/22/2025 0923 Given 81 mg Oral  Sandy Kilpatrick RN          cetirizine (ZyrTEC) tab 10 mg       Date Action Dose Route User    Discharged on 1/22/2025 1/22/2025 0924 Given 10 mg Oral Sandy Kilpatrick RN          docusate sodium (Colace) cap 100 mg       Date Action Dose Route User    Discharged on 1/22/2025 1/22/2025 0923 Given 100 mg Oral Sandy Kilpatrick RN          guaiFENesin ER (Mucinex) 12 hr tab 1,200 mg       Date Action Dose Route User    Discharged on 1/22/2025 1/22/2025 0924 Given 1,200 mg Oral Sandy Kilpatrick RN          sennosides (Senokot) tab 8.6 mg       Date Action Dose Route User    Discharged on 1/22/2025 1/22/2025 0924 Given 8.6 mg Oral Sandy Kilpatrick RN          multivitamin (Tab-A-Eder/Beta Carotene) tab 1 tablet       Date Action Dose Route User    Discharged on 1/22/2025 1/22/2025 0923 Given 1 tablet Oral Sandy Kilpatrick RN            furosemide (Lasix) 10 mg/mL injection 40 mg  Dose: 40 mg  Freq: 2 times daily (diuretic) Route: IV  Start: 01/20/25 1700 End: 01/21/25 0839           1704 AA-Given      0839 KM-Given              Vitals (last day) before discharge       Date/Time Temp Pulse Resp BP SpO2 Weight O2 Device O2 Flow Rate (L/min) [facility]    01/22/25 1603 98.4 °F (36.9 °C) 92 20 155/70 95 % -- -- -- NN    01/22/25 1203 98.4 °F (36.9 °C) 100 19 143/63 93 % -- None (Room air) -- NN    01/22/25 1124 -- 113 -- -- -- -- -- -- NN    01/22/25 1000 -- 99 -- -- -- -- -- -- NN    01/22/25 0800 98 °F (36.7 °C) 89 20 137/82 90 % -- None (Room air) -- NN    01/22/25 0602 -- -- -- -- -- 208 lb 15.9 oz (94.8 kg) -- -- JG    01/22/25 0601 98.3 °F (36.8 °C) 77 16 141/66 92 % -- None (Room air) --     01/22/25 0519 -- -- -- -- 92 % -- -- --     01/21/25 2245 98.4 °F (36.9 °C) 86 20 138/64 -- -- None (Room air) --     01/21/25 2000 98.4 °F (36.9 °C) 104 29 135/76 96 % -- None (Room air) --     01/21/25 1603 98.3 °F (36.8 °C) 99 20 148/69 96 % -- None (Room air) --     01/21/25 1231 -- 111 -- -- 94 % -- -- -- NN     01/21/25 1208 98.1 °F (36.7 °C) 81 17 132/67 93 % -- None (Room air) --     01/21/25 0807 97.9 °F (36.6 °C) 80 19 130/67 90 % -- None (Room air) --     01/21/25 0600 98.8 °F (37.1 °C) 73 18 128/50 94 % 210 lb 15.7 oz (95.7 kg) None (Room air) --             01/21/25 2245 98.4 °F (36.9 °C) 86 20 138/64 -- -- None (Room air) --    01/21/25 2000 98.4 °F (36.9 °C) 104 29 Abnormal  135/76 96 % -- None (Room air) --    01/21/25 1603 98.3 °F (36.8 °C) 99 20 148/69 96 % -- None (Room air) --        Blood Transfusion Record       Product Unit Status Volume Start End            Transfuse fresh frozen plasma       24  [id number]  [id number] Completed 01/16/25 1735 305 mL 01/16/25 1648 01/16/25 1658       24  [id number]  [id number] Completed 01/16/25 1735 301 mL 01/16/25 1633 01/16/25 1643                Transfuse platelets       24  [id number]  [id number] Completed 01/16/25 1735 565 mL 01/16/25 1617 01/16/25 1627               [facility] Reference Range & Units 01/20/25 05:58 01/21/25 06:18   WBC 4.0 - 11.0 x10(3) uL 11.4 (H) 12.3 (H)   Hemoglobin 13.0 - 17.5 g/dL 9.9 (L) 11.6 (L)   Hematocrit 39.0 - 53.0 % 28.7 (L) 34.4 (L)   Platelet Count 150.0 - 450.0 10(3)uL 211.0 296.0   RBC 3.80 - 5.80 x10(6)uL 3.13 (L) 3.72 (L)   (H): Data is abnormally high  (L): Data is abnormally low

## 2025-01-23 NOTE — PAYOR COMM NOTE
--------------  DISCHARGE REVIEW    Payor: [employer]  Subscriber #:  [id number]  Authorization Number: [id number]    Admit date: 1/15/25  Admit time:   5:19 PM  Discharge Date: 2025  6:47 PM     Admitting Physician: Mathew Grewal MD  Attending Physician:  No att. providers found  Primary Care Physician: Parker Clancy MD          Discharge Summary Notes        Discharge Summary signed by Mathew Grewal MD at 2025  2:30 AM       Author: Mathew Grewal MD Specialty: CARDIOLOGY Author Type: Physician    Filed: 2025  2:30 AM Date of Service: 2025  2:27 AM Status: Signed    : Mathew Grewal MD (Physician)              Signed       Expand All Collapse All    [facility] Cardiology  Consultation Note              Juan Pablo Lam Patient Status:  Inpatient    1960 MRN [id number]   Location [facility] CARDIOVASCULAR SURGERY Attending Mathew Grewal MD   Hosp Day # 7 PCP Parker Clancy MD      Reason for consult: CAD     Events: No CP or SOB. L wrist drop persists. MRI pending     History of Present Illness:  Juan Pablo Lam is a 64 year old male who presented to [facility] on 1/15/2025 for elective cath. Had abnormal nuc stress. Chest pressure at high intensity exertion only, last few months. None at rest. Cath with ostial left main stenosis. .      Medications:  Current Hospital Medications   No current facility-administered medications for this encounter.            Past Medical History       Past Medical History:    Coronary atherosclerosis    High cholesterol            Past Surgical History         Past Surgical History:   Procedure Laterality Date    [place] teeth removed                     Family History  family history is not on file.     Social History   reports that he has never smoked. He has never used smokeless tobacco. He reports current alcohol use. He reports that he does not use drugs.      Allergies  [Allergies]    [Allergies]  No Known  Allergies     Review of Systems:  As per HPI, otherwise 10 point ROS is negative in detail.     Physical Exam:  Blood pressure 155/70, pulse 92, temperature 98.4 °F (36.9 °C), temperature source Oral, resp. rate 20, height 70\", weight 208 lb 15.9 oz (94.8 kg), SpO2 95%.  Temp (24hrs), Av.3 °F (36.8 °C), Min:98 °F (36.7 °C), Max:98.4 °F (36.9 °C)         Wt Readings from Last 3 Encounters:   25 208 lb 15.9 oz (94.8 kg)   22 222 lb (100.7 kg)         General: Awake and alert; in no acute distress  HEENT: Extraocular movements are intact; sclerae are anicteric; scalp is atraumatic  Neck: Supple; no JVD; no carotid bruits  Cardiac: Regular rate and regular rhythm; normal S1 and S2, no murmurs, rubs, or gallops are appreciated  Lungs: Clear to auscultation bilaterally; no accessory muscle use is noted, no wheezes, rhonci or rales  Abdomen: Soft, non-distended, non-tender; bowel sounds are normoactive  Extremities: Warm, no edema, clubbing or cyanosis; moves all 4 extremities normally, distal pulses intact and equal  Psychiatric: Normal mood and affect; answers questions appropriately  Dermatologic: No rashes; normal skin turgor  L wrist drop     Diagnostic testing:     Labs:         Lab Results   Component Value Date     INR 0.74 (L) 2025     INR 1.49 (H) 2025            Lab Results   Component Value Date     PGLU 118 2025         Cardiac diagnostics:     Telemetry: Predominantly sinus rhythm, no malignant arrhythmias      Impression:  64 year old male with stable angina but severe ostial left main stenosis.   S/p 2V CABG: LIMA LAD SVG OM (25)  HTN  L wrist drop, likely ulnar n. neuropathy post surgery     Recommendations:  ASA, statin  Toprol 50mg daily  Appears euvolemic, on RA. Stop lasix at dc.   PT/OT.   Neuro eval. Brain MRI pending.   Otherwise stable from cardiac standpoint for dc     Hospital course:  Admitted after elective cath showing left main stenosis. Underwent 2V  CABG. Post op with L wrist drop, evaluated by neuro. Brain MRI unremarkable. Felt to be radial nerve palsy. Recommended brace, PT/OT. Stable at discharge.                    Electronically signed by Mathew Grewal MD on 1/23/2025  2:30 AM         REVIEWER COMMENTS

## 2025-01-23 NOTE — PROGRESS NOTES
Patient here with wife today  Patient here today for drop left wrist  MRI of the brain was done yesterday

## 2025-01-23 NOTE — PROGRESS NOTES
[facility] Cardiology  Consultation Note      Juan Pablo Lam Patient Status:  Inpatient    1960 MRN [id number]   Location [facility] CARDIOVASCULAR SURGERY Attending Mathew Grewal MD   Hosp Day # 7 PCP Parker Clancy MD     Reason for consult: CAD    Events: No CP or SOB. L wrist drop persists. MRI pending    History of Present Illness:  Juan Pablo Lam is a 64 year old male who presented to [facility] on 1/15/2025 for elective cath. Had abnormal nuc stress. Chest pressure at high intensity exertion only, last few months. None at rest. Cath with ostial left main stenosis. .     Medications:  No current facility-administered medications for this encounter.       Past Medical History:    Coronary atherosclerosis    High cholesterol       Past Surgical History:   Procedure Laterality Date    [place] teeth removed             Family History  family history is not on file.    Social History   reports that he has never smoked. He has never used smokeless tobacco. He reports current alcohol use. He reports that he does not use drugs.     Allergies  Allergies[1]    Review of Systems:  As per HPI, otherwise 10 point ROS is negative in detail.    Physical Exam:  Blood pressure 155/70, pulse 92, temperature 98.4 °F (36.9 °C), temperature source Oral, resp. rate 20, height 70\", weight 208 lb 15.9 oz (94.8 kg), SpO2 95%.  Temp (24hrs), Av.3 °F (36.8 °C), Min:98 °F (36.7 °C), Max:98.4 °F (36.9 °C)    Wt Readings from Last 3 Encounters:   25 208 lb 15.9 oz (94.8 kg)   22 222 lb (100.7 kg)       General: Awake and alert; in no acute distress  HEENT: Extraocular movements are intact; sclerae are anicteric; scalp is atraumatic  Neck: Supple; no JVD; no carotid bruits  Cardiac: Regular rate and regular rhythm; normal S1 and S2, no murmurs, rubs, or gallops are appreciated  Lungs: Clear to auscultation bilaterally; no accessory muscle use is noted, no wheezes, rhonci or rales  Abdomen:  Soft, non-distended, non-tender; bowel sounds are normoactive  Extremities: Warm, no edema, clubbing or cyanosis; moves all 4 extremities normally, distal pulses intact and equal  Psychiatric: Normal mood and affect; answers questions appropriately  Dermatologic: No rashes; normal skin turgor  L wrist drop    Diagnostic testing:    Labs:   Lab Results   Component Value Date    INR 0.74 (L) 01/16/2025    INR 1.49 (H) 01/16/2025        Lab Results   Component Value Date    PGLU 118 01/22/2025       Cardiac diagnostics:    Telemetry: Predominantly sinus rhythm, no malignant arrhythmias     Impression:  64 year old male with stable angina but severe ostial left main stenosis.   S/p 2V CABG: LIMA LAD SVG OM (1/16/25)  HTN  L wrist drop, likely ulnar n. neuropathy post surgery    Recommendations:  ASA, statin  Toprol 50mg daily  Appears euvolemic, on RA. Stop lasix at dc.   PT/OT.   Neuro eval. Brain MRI pending.   Otherwise stable from cardiac standpoint for dc        [1] No Known Allergies

## 2025-01-31 ENCOUNTER — HOSPITAL ENCOUNTER (OUTPATIENT)
Dept: GENERAL RADIOLOGY | Facility: HOSPITAL | Age: 65
Discharge: HOME | End: 2025-01-31
Attending: THORACIC SURGERY (CARDIOTHORACIC VASCULAR SURGERY)
Payer: COMMERCIAL

## 2025-01-31 DIAGNOSIS — J90 PLEURAL EFFUSION: ICD-10-CM

## 2025-01-31 PROCEDURE — 71048 X-RAY EXAM CHEST 4+ VIEWS: CPT | Performed by: THORACIC SURGERY (CARDIOTHORACIC VASCULAR SURGERY)

## 2025-02-03 ENCOUNTER — TELEPHONE (OUTPATIENT)
Dept: INTERVENTIONAL RADIOLOGY/VASCULAR | Facility: HOSPITAL | Age: 65
End: 2025-02-03

## 2025-02-03 NOTE — TELEPHONE ENCOUNTER
Spoke with patient regarding CXR. He states his breathing is improving since leaving the hospital. States he has been using IS and increasing activity w/o issue. He will follow up with us on 2/12 as scheduled.  He states his left wrist continues to improve as well. He is able to move at the wrist now and make a fist. Sensation is improving as well. He has been seen and is following with neurology.

## 2025-02-07 ENCOUNTER — ORDER TRANSCRIPTION (OUTPATIENT)
Dept: CARDIAC REHAB | Facility: HOSPITAL | Age: 65
End: 2025-02-07

## 2025-02-07 DIAGNOSIS — Z95.1 S/P CABG (CORONARY ARTERY BYPASS GRAFT): Primary | ICD-10-CM

## 2025-02-20 ENCOUNTER — CARDPULM VISIT (OUTPATIENT)
Age: 65
End: 2025-02-20
Attending: INTERNAL MEDICINE
Payer: COMMERCIAL

## 2025-02-21 ENCOUNTER — CARDPULM VISIT (OUTPATIENT)
Age: 65
End: 2025-02-21
Attending: INTERNAL MEDICINE
Payer: COMMERCIAL

## 2025-02-21 PROCEDURE — 93798 PHYS/QHP OP CAR RHAB W/ECG: CPT

## 2025-02-24 ENCOUNTER — APPOINTMENT (OUTPATIENT)
Age: 65
End: 2025-02-24
Attending: INTERNAL MEDICINE
Payer: COMMERCIAL

## 2025-02-24 ENCOUNTER — TELEPHONE (OUTPATIENT)
Dept: NEUROLOGY | Facility: CLINIC | Age: 65
End: 2025-02-24

## 2025-02-24 ENCOUNTER — PROCEDURE VISIT (OUTPATIENT)
Dept: NEUROLOGY | Facility: CLINIC | Age: 65
End: 2025-02-24
Payer: COMMERCIAL

## 2025-02-24 DIAGNOSIS — G54.0 BRACHIAL PLEXOPATHY: ICD-10-CM

## 2025-02-24 DIAGNOSIS — M21.332 LEFT WRISTDROP: Primary | ICD-10-CM

## 2025-02-24 NOTE — TELEPHONE ENCOUNTER
Fax received from [facility] Physical Therapy reagarding patient Initial Eval - Plan of Care, dos 2/24/25. Document endorsed to nurses bin for provider review.

## 2025-02-24 NOTE — TELEPHONE ENCOUNTER
[facility] physical therapy notes/orders placed in Dr Perkins's folder for review and signature.  PT Plan: 2x/week for 6 weeks.

## 2025-02-24 NOTE — PROCEDURES
[address], [address]  [place] [address]      Full Name: geni Lam Gender: Male  Patient ID: [id number] YOB: 1960      Visit Date: 2/24/2025 3:36 PM  Age: 64 Years  Examining Physician: Bela Perkins DO  Referring Physician: Bela Perkins DO  History: Five week history of left hand weakness, mainly in the hand extensors, but also in the intrinsic hand muscles, and numbness in the medial three digits.      Sensory NCS      Nerve / Sites Rec. Site Onset Lat Peak Lat NP Amp PP Amp Segments Distance Velocity Comment     ms ms µV µV  cm m/s    R Hand - Sensory      Median Wrist D2 3.49 4.79 4.6 13.4 Median Wrist - D2 14 40       Ulnar Wrist  D5 2.08 2.76 12.2 14.5 Ulnar Wrist  - D5 14 67    L Hand - Sensory      Median Wrist D2 4.48 6.04 11.1 19.1 Median Wrist - D2 14 31       Ulnar Wrist  D5 2.29 3.02 12.1 5.2 Ulnar Wrist  - D5 14 61       Radial Forearm Wrist 2.08 2.92 20.5 5.8 Radial Forearm - Wrist 10 48    R Medial antebrachial cutaneous - (Antidromic)      Elbow Forearm 1.67 2.19 9.8 12.8 Elbow - Forearm 10 60    L Medial antebrachial cutaneous - (Antidromic)      Elbow Forearm 1.46 1.93 12.2 7.0 Elbow - Forearm 10 69        Motor NCS      Nerve / Sites Muscle Latency Amplitude Segments Dist. Lat Diff Velocity Comments     ms mV  cm ms m/s    R Median, Ulnar - (APB, ADM)      Median Wrist APB 5.04 9.1 Median Wrist - APB 8         Median Elbow APB 10.96 9.3 Median Elbow - Wrist 28 5.92 47.3        Ulnar A.Elbow - Wrist       L Median, Ulnar - (APB, ADM)      Median Wrist APB 4.94 9.2 Median Wrist - APB 8         Median Elbow APB 11.08 7.6 Median Elbow - Wrist 28 6.15 45.6       Ulnar Wrist ADM 2.94 11.1 Ulnar Wrist - ADM 8         Ulnar B.Elbow ADM 7.71 10.4 Ulnar B.Elbow - Wrist 27 4.77 56.6       Ulnar A.Elbow ADM 9.40 10.2 Ulnar A.Elbow - B.Elbow 9 1.69 53.3        Ulnar A.Elbow - Wrist  6.46         EMG Summary Table     Spontaneous MUAP  Recruitment   Muscle IA Fib PSW Fasc H.F. Amp Dur. PPP Pattern   L. Extensor carpi radialis N 3+ 3+ None None N N N N   L. Flexor carpi radialis 1+ 1+ 1+ None None N N N N   L. First dorsal interosseous N None None None None N 1+ N N   L. Abductor pollicis brevis N None None None None N N N N   L. Triceps brachii N None None None None N N N N         geni Lam [id number] 2/24/2025 3:36 PM     2 of 3    Summary:    The bilateral median sensory responses had decreased amplitudes, worse on the right. Distal latency was prolonged bilaterally.  Ulnar sensory responses were normal, as was the left radial sensory response.  The bilateral MAC sensory responses were normal.  The median motor responses were both prolonged, and had slight slowing of conduction velocity. Amplitudes were normal.  The left ulnar motor response was normal.  Needle EMG using a concentric needle was performed on selected muscles of the left upper extremity. There was many positive sharp waves and fibrillations seen in the left ECR, and some sharp waves seen in the left FCR. Motor unit remodeling was seen in the left FDI.    Conclusion:   There is electrophysiologic suggestion of a left subacute upper and middle division plexopathy, or C6 and C7 cervical radiculopathies.  There is also suggestion of a chronic C8/T1 cervical radiculopathy.  In addition, there is evidence of bilateral mild to moderate compressive median mononeuropathies at the wrist, carpal tunnel.       ________________________  Bela Perkins DO      geni Lam [id number] 2/24/2025 3:36 PM     2 of 3

## 2025-02-26 ENCOUNTER — CARDPULM VISIT (OUTPATIENT)
Age: 65
End: 2025-02-26
Attending: INTERNAL MEDICINE
Payer: COMMERCIAL

## 2025-02-26 PROCEDURE — 93798 PHYS/QHP OP CAR RHAB W/ECG: CPT

## 2025-02-28 ENCOUNTER — CARDPULM VISIT (OUTPATIENT)
Age: 65
End: 2025-02-28
Attending: INTERNAL MEDICINE
Payer: COMMERCIAL

## 2025-02-28 ENCOUNTER — APPOINTMENT (OUTPATIENT)
Dept: OCCUPATIONAL MEDICINE | Facility: HOSPITAL | Age: 65
End: 2025-02-28
Attending: Other
Payer: COMMERCIAL

## 2025-02-28 PROCEDURE — 93798 PHYS/QHP OP CAR RHAB W/ECG: CPT

## 2025-03-03 ENCOUNTER — CARDPULM VISIT (OUTPATIENT)
Age: 65
End: 2025-03-03
Attending: INTERNAL MEDICINE
Payer: COMMERCIAL

## 2025-03-03 PROCEDURE — 93798 PHYS/QHP OP CAR RHAB W/ECG: CPT

## 2025-03-05 ENCOUNTER — CARDPULM VISIT (OUTPATIENT)
Age: 65
End: 2025-03-05
Attending: INTERNAL MEDICINE
Payer: COMMERCIAL

## 2025-03-05 PROCEDURE — 93798 PHYS/QHP OP CAR RHAB W/ECG: CPT

## 2025-03-06 ENCOUNTER — APPOINTMENT (OUTPATIENT)
Dept: OCCUPATIONAL MEDICINE | Facility: HOSPITAL | Age: 65
End: 2025-03-06
Attending: Other
Payer: COMMERCIAL

## 2025-03-07 ENCOUNTER — CARDPULM VISIT (OUTPATIENT)
Age: 65
End: 2025-03-07
Attending: INTERNAL MEDICINE
Payer: COMMERCIAL

## 2025-03-07 PROCEDURE — 93798 PHYS/QHP OP CAR RHAB W/ECG: CPT

## 2025-03-10 ENCOUNTER — CARDPULM VISIT (OUTPATIENT)
Age: 65
End: 2025-03-10
Attending: INTERNAL MEDICINE
Payer: COMMERCIAL

## 2025-03-10 PROCEDURE — 93798 PHYS/QHP OP CAR RHAB W/ECG: CPT

## 2025-03-11 ENCOUNTER — HOSPITAL ENCOUNTER (EMERGENCY)
Facility: HOSPITAL | Age: 65
Discharge: HOME | End: 2025-03-11
Attending: EMERGENCY MEDICINE
Payer: COMMERCIAL

## 2025-03-11 ENCOUNTER — APPOINTMENT (OUTPATIENT)
Dept: CT IMAGING | Facility: HOSPITAL | Age: 65
End: 2025-03-11
Attending: EMERGENCY MEDICINE
Payer: COMMERCIAL

## 2025-03-11 ENCOUNTER — APPOINTMENT (OUTPATIENT)
Dept: OCCUPATIONAL MEDICINE | Facility: HOSPITAL | Age: 65
End: 2025-03-11
Attending: Other
Payer: COMMERCIAL

## 2025-03-11 VITALS
RESPIRATION RATE: 27 BRPM | SYSTOLIC BLOOD PRESSURE: 139 MMHG | HEART RATE: 62 BPM | BODY MASS INDEX: 30.78 KG/M2 | TEMPERATURE: 98 F | WEIGHT: 215 LBS | DIASTOLIC BLOOD PRESSURE: 66 MMHG | OXYGEN SATURATION: 100 % | HEIGHT: 70 IN

## 2025-03-11 DIAGNOSIS — R10.9 LEFT FLANK PAIN: Primary | ICD-10-CM

## 2025-03-11 LAB
ALBUMIN SERPL-MCNC: 5.1 G/DL (ref 3.2–4.8)
ALBUMIN/GLOB SERPL: 1.8 {RATIO} (ref 1–2)
ALP LIVER SERPL-CCNC: 89 U/L
ALT SERPL-CCNC: 19 U/L
ANION GAP SERPL CALC-SCNC: 6 MMOL/L (ref 0–18)
APTT PPP: 31.9 SECONDS (ref 23–36)
AST SERPL-CCNC: 23 U/L (ref ?–34)
BASOPHILS # BLD AUTO: 0.06 X10(3) UL (ref 0–0.2)
BASOPHILS NFR BLD AUTO: 0.7 %
BILIRUB SERPL-MCNC: 1.3 MG/DL (ref 0.2–1.1)
BILIRUB UR QL STRIP.AUTO: NEGATIVE
BUN BLD-MCNC: 13 MG/DL (ref 9–23)
CALCIUM BLD-MCNC: 9.9 MG/DL (ref 8.7–10.6)
CHLORIDE SERPL-SCNC: 106 MMOL/L (ref 98–112)
CLARITY UR REFRACT.AUTO: CLEAR
CO2 SERPL-SCNC: 30 MMOL/L (ref 21–32)
CREAT BLD-MCNC: 1.11 MG/DL
EGFRCR SERPLBLD CKD-EPI 2021: 73 ML/MIN/1.73M2 (ref 60–?)
EOSINOPHIL # BLD AUTO: 0.28 X10(3) UL (ref 0–0.7)
EOSINOPHIL NFR BLD AUTO: 3.2 %
ERYTHROCYTE [DISTWIDTH] IN BLOOD BY AUTOMATED COUNT: 13.3 %
GLOBULIN PLAS-MCNC: 2.9 G/DL (ref 2–3.5)
GLUCOSE BLD-MCNC: 102 MG/DL (ref 70–99)
GLUCOSE UR STRIP.AUTO-MCNC: NORMAL MG/DL
HCT VFR BLD AUTO: 43 %
HGB BLD-MCNC: 14.2 G/DL
IMM GRANULOCYTES # BLD AUTO: 0.02 X10(3) UL (ref 0–1)
IMM GRANULOCYTES NFR BLD: 0.2 %
INR BLD: 1.05 (ref 0.8–1.2)
KETONES UR STRIP.AUTO-MCNC: NEGATIVE MG/DL
LEUKOCYTE ESTERASE UR QL STRIP.AUTO: NEGATIVE
LIPASE SERPL-CCNC: 36 U/L (ref 12–53)
LYMPHOCYTES # BLD AUTO: 3 X10(3) UL (ref 1–4)
LYMPHOCYTES NFR BLD AUTO: 34.1 %
MCH RBC QN AUTO: 29.2 PG (ref 26–34)
MCHC RBC AUTO-ENTMCNC: 33 G/DL (ref 31–37)
MCV RBC AUTO: 88.3 FL
MONOCYTES # BLD AUTO: 0.61 X10(3) UL (ref 0.1–1)
MONOCYTES NFR BLD AUTO: 6.9 %
NEUTROPHILS # BLD AUTO: 4.82 X10 (3) UL (ref 1.5–7.7)
NEUTROPHILS # BLD AUTO: 4.82 X10(3) UL (ref 1.5–7.7)
NEUTROPHILS NFR BLD AUTO: 54.9 %
NITRITE UR QL STRIP.AUTO: NEGATIVE
OSMOLALITY SERPL CALC.SUM OF ELEC: 294 MOSM/KG (ref 275–295)
PH UR STRIP.AUTO: 7 [PH] (ref 5–8)
PLATELET # BLD AUTO: 210 10(3)UL (ref 150–450)
POTASSIUM SERPL-SCNC: 4.3 MMOL/L (ref 3.5–5.1)
PROT SERPL-MCNC: 8 G/DL (ref 5.7–8.2)
PROT UR STRIP.AUTO-MCNC: NEGATIVE MG/DL
PROTHROMBIN TIME: 13.9 SECONDS (ref 11.6–14.8)
RBC # BLD AUTO: 4.87 X10(6)UL
RBC UR QL AUTO: NEGATIVE
SODIUM SERPL-SCNC: 142 MMOL/L (ref 136–145)
SP GR UR STRIP.AUTO: >1.03 (ref 1–1.03)
TROPONIN I SERPL HS-MCNC: 8 NG/L
TROPONIN I SERPL HS-MCNC: 8 NG/L
UROBILINOGEN UR STRIP.AUTO-MCNC: NORMAL MG/DL
WBC # BLD AUTO: 8.8 X10(3) UL (ref 4–11)

## 2025-03-11 PROCEDURE — 71275 CT ANGIOGRAPHY CHEST: CPT | Performed by: EMERGENCY MEDICINE

## 2025-03-11 PROCEDURE — 83690 ASSAY OF LIPASE: CPT | Performed by: EMERGENCY MEDICINE

## 2025-03-11 PROCEDURE — 85610 PROTHROMBIN TIME: CPT | Performed by: EMERGENCY MEDICINE

## 2025-03-11 PROCEDURE — 81003 URINALYSIS AUTO W/O SCOPE: CPT | Performed by: EMERGENCY MEDICINE

## 2025-03-11 PROCEDURE — 99284 EMERGENCY DEPT VISIT MOD MDM: CPT

## 2025-03-11 PROCEDURE — 84484 ASSAY OF TROPONIN QUANT: CPT | Performed by: EMERGENCY MEDICINE

## 2025-03-11 PROCEDURE — 85025 COMPLETE CBC W/AUTO DIFF WBC: CPT | Performed by: EMERGENCY MEDICINE

## 2025-03-11 PROCEDURE — 85730 THROMBOPLASTIN TIME PARTIAL: CPT | Performed by: EMERGENCY MEDICINE

## 2025-03-11 PROCEDURE — 93005 ELECTROCARDIOGRAM TRACING: CPT

## 2025-03-11 PROCEDURE — 74177 CT ABD & PELVIS W/CONTRAST: CPT | Performed by: EMERGENCY MEDICINE

## 2025-03-11 PROCEDURE — 80053 COMPREHEN METABOLIC PANEL: CPT | Performed by: EMERGENCY MEDICINE

## 2025-03-11 PROCEDURE — 36415 COLL VENOUS BLD VENIPUNCTURE: CPT

## 2025-03-11 PROCEDURE — 93010 ELECTROCARDIOGRAM REPORT: CPT

## 2025-03-11 PROCEDURE — 99285 EMERGENCY DEPT VISIT HI MDM: CPT

## 2025-03-11 NOTE — CONSULTS
[facility] Cardiology  Consultation Note      Juan Pablo Lam Patient Status:  Emergency    1960 MRN [id number]   Location [facility] EMERGENCY DEPARTMENT Attending Omid Li*   Hosp Day # 0 PCP Mina Shanks MD     Reason for consult: L flank pain, h/o CABG    Primary cardiologist: Uri     History of Present Illness:  Juan Pablo Lam is a 64 year old male with recent 2v CABG 2025 who presented to [facility] on 3/11/2025 with L flank pain that woke him from sleep ~ 3:30 am. It had some radiation toward the left neck, not really the arm, and not into the chest area. It feels completely different than his prior angina which was central substernal chest pain with exertion. The L flank pain is worse with moving the L arm. No other symptoms including SOB, palps, leg swelling, orthopnea, syncope. His L wrist drop he developed post CABG has improved significantly.      Medications:  No current facility-administered medications for this encounter.       Past Medical History:    Coronary atherosclerosis    High cholesterol       Past Surgical History:   Procedure Laterality Date    Heart surgery      [place] teeth removed             Family History  family history is not on file.    Social History   reports that he has never smoked. He has never used smokeless tobacco. He reports current alcohol use. He reports that he does not use drugs.     Allergies  Allergies[1]    Review of Systems:  As per HPI, otherwise 10 point ROS is negative in detail.    Physical Exam:  Blood pressure 142/69, pulse 61, temperature 98 °F (36.7 °C), resp. rate 16, height 70\", weight 215 lb (97.5 kg), SpO2 100%.  Temp (24hrs), Av °F (36.7 °C), Min:98 °F (36.7 °C), Max:98 °F (36.7 °C)    Wt Readings from Last 3 Encounters:   25 215 lb (97.5 kg)   25 214 lb (97.1 kg)   25 208 lb 15.9 oz (94.8 kg)       General: Awake and alert; in no acute distress  HEENT: Extraocular movements are  intact; sclerae are anicteric; scalp is atraumatic  Neck: Supple; no JVD; no carotid bruits  Cardiac: Regular rate and regular rhythm; normal S1 and S2, no murmurs, rubs, or gallops are appreciated  Lungs: Clear to auscultation bilaterally; no accessory muscle use is noted, no wheezes, rhonci or rales  Abdomen: Soft, non-distended, non-tender; bowel sounds are normoactive  Extremities: Warm, no edema, clubbing or cyanosis; moves all 4 extremities normally, distal pulses intact and equal  Psychiatric: Normal mood and affect; answers questions appropriately  Dermatologic: No rashes; normal skin turgor    Diagnostic testing:    Labs:   Lab Results   Component Value Date    INR 1.05 03/11/2025    INR 0.74 (L) 01/16/2025        Lab Results   Component Value Date    WBC 8.8 03/11/2025    HGB 14.2 03/11/2025    HCT 43.0 03/11/2025    .0 03/11/2025    CREATSERUM 1.11 03/11/2025    BUN 13 03/11/2025     03/11/2025    K 4.3 03/11/2025     03/11/2025    CO2 30.0 03/11/2025     03/11/2025    CA 9.9 03/11/2025    ALB 5.1 03/11/2025    ALKPHO 89 03/11/2025    BILT 1.3 03/11/2025    TP 8.0 03/11/2025    AST 23 03/11/2025    ALT 19 03/11/2025    PTT 31.9 03/11/2025    INR 1.05 03/11/2025    PTP 13.9 03/11/2025    LIP 36 03/11/2025       Cardiac diagnostics:    EKG 3/11/2025: SR, LVH with repol abnrm, unchanged from prior    Impression:  64 year old male presenting with L flank pain  H/o 2V CABG: LIMA LAD SVG OM (1/16/25) for ostial LM stenosis  Post CABG L wrist drop  HTN    Recommendations:  His symptoms are not cardiac. Troponin neg x 2 and EKG nonacute. Suspect MSK given positional nature. Advised to rest from cardiac rehab for a few sessions, supportive care.   CT reviewed, small L pleural effusion is residual post op. His pain is not pleuritic.   No stone. UA pending per ER.     Thank you for allowing our practice to participate in the care of your patient. Please do not hesitate to contact me if you  have any questions.    Mathew Grewal MD  Interventional Cardiology  [facility]  Office: [phone]         [1] No Known Allergies
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
## 2025-03-11 NOTE — DISCHARGE INSTRUCTIONS
Follow-up with your primary care doctor within the next week for reassessment.  Please discuss the need for additional imaging based off the finding below with them at that appointment.  Return for any new or worsening pain, difficulty breathing or any other concerning symptoms.    ---- Abnormal appearance of the upper half of the sacrum with heterogeneous attenuation of the bone including areas of lucency and sclerosis.  The bone does not appear fractured and there is no presacral or parasacral mass.  No similar additional areas of   bone abnormality.  No previous imaging, and the chronicity and etiology of this is uncertain.  However sacral malignant lesion in this area cannot be completely excluded, and advise non emergency outpatient MRI imaging of the lumbosacral junction and   sacrum, with contrast for further evaluation

## 2025-03-12 ENCOUNTER — CARDPULM VISIT (OUTPATIENT)
Age: 65
End: 2025-03-12
Attending: INTERNAL MEDICINE
Payer: COMMERCIAL

## 2025-03-12 LAB
ATRIAL RATE: 64 BPM
P AXIS: 61 DEGREES
P-R INTERVAL: 150 MS
Q-T INTERVAL: 408 MS
QRS DURATION: 84 MS
QTC CALCULATION (BEZET): 420 MS
R AXIS: -5 DEGREES
T AXIS: 129 DEGREES
VENTRICULAR RATE: 64 BPM

## 2025-03-12 PROCEDURE — 93798 PHYS/QHP OP CAR RHAB W/ECG: CPT

## 2025-03-13 ENCOUNTER — APPOINTMENT (OUTPATIENT)
Dept: OCCUPATIONAL MEDICINE | Facility: HOSPITAL | Age: 65
End: 2025-03-13
Attending: Other
Payer: COMMERCIAL

## 2025-03-14 ENCOUNTER — CARDPULM VISIT (OUTPATIENT)
Age: 65
End: 2025-03-14
Attending: INTERNAL MEDICINE
Payer: COMMERCIAL

## 2025-03-14 PROCEDURE — 93798 PHYS/QHP OP CAR RHAB W/ECG: CPT

## 2025-03-17 ENCOUNTER — CARDPULM VISIT (OUTPATIENT)
Age: 65
End: 2025-03-17
Attending: INTERNAL MEDICINE
Payer: COMMERCIAL

## 2025-03-17 PROCEDURE — 93798 PHYS/QHP OP CAR RHAB W/ECG: CPT

## 2025-03-18 ENCOUNTER — APPOINTMENT (OUTPATIENT)
Dept: OCCUPATIONAL MEDICINE | Facility: HOSPITAL | Age: 65
End: 2025-03-18
Attending: Other
Payer: COMMERCIAL

## 2025-03-19 ENCOUNTER — CARDPULM VISIT (OUTPATIENT)
Age: 65
End: 2025-03-19
Attending: INTERNAL MEDICINE
Payer: COMMERCIAL

## 2025-03-19 PROCEDURE — 93798 PHYS/QHP OP CAR RHAB W/ECG: CPT

## 2025-03-20 ENCOUNTER — APPOINTMENT (OUTPATIENT)
Dept: OCCUPATIONAL MEDICINE | Facility: HOSPITAL | Age: 65
End: 2025-03-20
Attending: Other
Payer: COMMERCIAL

## 2025-03-21 ENCOUNTER — CARDPULM VISIT (OUTPATIENT)
Age: 65
End: 2025-03-21
Attending: INTERNAL MEDICINE
Payer: COMMERCIAL

## 2025-03-21 PROCEDURE — 93798 PHYS/QHP OP CAR RHAB W/ECG: CPT

## 2025-03-24 ENCOUNTER — CARDPULM VISIT (OUTPATIENT)
Age: 65
End: 2025-03-24
Attending: INTERNAL MEDICINE
Payer: COMMERCIAL

## 2025-03-24 PROCEDURE — 93798 PHYS/QHP OP CAR RHAB W/ECG: CPT

## 2025-03-25 ENCOUNTER — APPOINTMENT (OUTPATIENT)
Dept: OCCUPATIONAL MEDICINE | Facility: HOSPITAL | Age: 65
End: 2025-03-25
Attending: Other
Payer: COMMERCIAL

## 2025-03-26 ENCOUNTER — CARDPULM VISIT (OUTPATIENT)
Age: 65
End: 2025-03-26
Attending: INTERNAL MEDICINE
Payer: COMMERCIAL

## 2025-03-26 PROCEDURE — 93798 PHYS/QHP OP CAR RHAB W/ECG: CPT

## 2025-03-27 ENCOUNTER — APPOINTMENT (OUTPATIENT)
Dept: OCCUPATIONAL MEDICINE | Facility: HOSPITAL | Age: 65
End: 2025-03-27
Attending: Other
Payer: COMMERCIAL

## 2025-03-28 ENCOUNTER — CARDPULM VISIT (OUTPATIENT)
Age: 65
End: 2025-03-28
Attending: INTERNAL MEDICINE
Payer: COMMERCIAL

## 2025-03-28 PROCEDURE — 93798 PHYS/QHP OP CAR RHAB W/ECG: CPT

## 2025-03-31 ENCOUNTER — CARDPULM VISIT (OUTPATIENT)
Age: 65
End: 2025-03-31
Attending: INTERNAL MEDICINE
Payer: COMMERCIAL

## 2025-03-31 PROCEDURE — 93798 PHYS/QHP OP CAR RHAB W/ECG: CPT

## 2025-04-01 ENCOUNTER — APPOINTMENT (OUTPATIENT)
Dept: OCCUPATIONAL MEDICINE | Facility: HOSPITAL | Age: 65
End: 2025-04-01
Attending: Other
Payer: COMMERCIAL

## 2025-04-02 ENCOUNTER — CARDPULM VISIT (OUTPATIENT)
Age: 65
End: 2025-04-02
Attending: INTERNAL MEDICINE
Payer: COMMERCIAL

## 2025-04-02 PROCEDURE — 93798 PHYS/QHP OP CAR RHAB W/ECG: CPT

## 2025-04-03 ENCOUNTER — APPOINTMENT (OUTPATIENT)
Dept: OCCUPATIONAL MEDICINE | Facility: HOSPITAL | Age: 65
End: 2025-04-03
Attending: Other
Payer: COMMERCIAL

## 2025-04-04 ENCOUNTER — TELEPHONE (OUTPATIENT)
Dept: NEUROLOGY | Facility: CLINIC | Age: 65
End: 2025-04-04

## 2025-04-04 ENCOUNTER — CARDPULM VISIT (OUTPATIENT)
Age: 65
End: 2025-04-04
Attending: INTERNAL MEDICINE
Payer: COMMERCIAL

## 2025-04-04 PROCEDURE — 93798 PHYS/QHP OP CAR RHAB W/ECG: CPT

## 2025-04-07 ENCOUNTER — CARDPULM VISIT (OUTPATIENT)
Age: 65
End: 2025-04-07
Attending: INTERNAL MEDICINE
Payer: COMMERCIAL

## 2025-04-07 PROCEDURE — 93798 PHYS/QHP OP CAR RHAB W/ECG: CPT

## 2025-04-08 ENCOUNTER — APPOINTMENT (OUTPATIENT)
Dept: OCCUPATIONAL MEDICINE | Facility: HOSPITAL | Age: 65
End: 2025-04-08
Attending: Other
Payer: COMMERCIAL

## 2025-04-09 ENCOUNTER — CARDPULM VISIT (OUTPATIENT)
Age: 65
End: 2025-04-09
Attending: INTERNAL MEDICINE
Payer: COMMERCIAL

## 2025-04-09 PROCEDURE — 93798 PHYS/QHP OP CAR RHAB W/ECG: CPT

## 2025-04-10 ENCOUNTER — APPOINTMENT (OUTPATIENT)
Dept: OCCUPATIONAL MEDICINE | Facility: HOSPITAL | Age: 65
End: 2025-04-10
Attending: Other
Payer: COMMERCIAL

## 2025-04-11 ENCOUNTER — CARDPULM VISIT (OUTPATIENT)
Age: 65
End: 2025-04-11
Attending: INTERNAL MEDICINE
Payer: COMMERCIAL

## 2025-04-11 PROCEDURE — 93798 PHYS/QHP OP CAR RHAB W/ECG: CPT

## 2025-04-11 NOTE — TELEPHONE ENCOUNTER
Physical therapy discharge summary reviewed and signed by provider, faxed to [facility] with fax confirmation received and sent tos scanning.

## 2025-04-14 ENCOUNTER — CARDPULM VISIT (OUTPATIENT)
Age: 65
End: 2025-04-14
Attending: INTERNAL MEDICINE
Payer: COMMERCIAL

## 2025-04-14 PROCEDURE — 93798 PHYS/QHP OP CAR RHAB W/ECG: CPT

## 2025-04-16 ENCOUNTER — CARDPULM VISIT (OUTPATIENT)
Age: 65
End: 2025-04-16
Attending: INTERNAL MEDICINE
Payer: COMMERCIAL

## 2025-04-16 PROCEDURE — 93798 PHYS/QHP OP CAR RHAB W/ECG: CPT

## 2025-04-17 ENCOUNTER — CARDPULM VISIT (OUTPATIENT)
Age: 65
End: 2025-04-17
Attending: INTERNAL MEDICINE
Payer: COMMERCIAL

## 2025-04-17 PROCEDURE — 93798 PHYS/QHP OP CAR RHAB W/ECG: CPT

## 2025-04-18 ENCOUNTER — APPOINTMENT (OUTPATIENT)
Age: 65
End: 2025-04-18
Attending: INTERNAL MEDICINE
Payer: COMMERCIAL

## 2025-04-21 ENCOUNTER — CARDPULM VISIT (OUTPATIENT)
Age: 65
End: 2025-04-21
Attending: INTERNAL MEDICINE
Payer: COMMERCIAL

## 2025-04-21 PROCEDURE — 93798 PHYS/QHP OP CAR RHAB W/ECG: CPT

## 2025-04-23 ENCOUNTER — CARDPULM VISIT (OUTPATIENT)
Age: 65
End: 2025-04-23
Attending: INTERNAL MEDICINE
Payer: COMMERCIAL

## 2025-04-23 PROCEDURE — 93798 PHYS/QHP OP CAR RHAB W/ECG: CPT

## 2025-04-25 ENCOUNTER — CARDPULM VISIT (OUTPATIENT)
Age: 65
End: 2025-04-25
Attending: INTERNAL MEDICINE
Payer: COMMERCIAL

## 2025-04-25 PROCEDURE — 93798 PHYS/QHP OP CAR RHAB W/ECG: CPT

## 2025-04-28 ENCOUNTER — CARDPULM VISIT (OUTPATIENT)
Age: 65
End: 2025-04-28
Attending: INTERNAL MEDICINE
Payer: COMMERCIAL

## 2025-04-28 PROCEDURE — 93798 PHYS/QHP OP CAR RHAB W/ECG: CPT

## 2025-04-30 ENCOUNTER — CARDPULM VISIT (OUTPATIENT)
Age: 65
End: 2025-04-30
Attending: INTERNAL MEDICINE
Payer: COMMERCIAL

## 2025-04-30 PROCEDURE — 93798 PHYS/QHP OP CAR RHAB W/ECG: CPT

## 2025-05-02 ENCOUNTER — CARDPULM VISIT (OUTPATIENT)
Age: 65
End: 2025-05-02
Attending: INTERNAL MEDICINE
Payer: COMMERCIAL

## 2025-05-02 PROCEDURE — 93798 PHYS/QHP OP CAR RHAB W/ECG: CPT

## 2025-05-05 ENCOUNTER — CARDPULM VISIT (OUTPATIENT)
Age: 65
End: 2025-05-05
Attending: INTERNAL MEDICINE
Payer: COMMERCIAL

## 2025-05-05 PROCEDURE — 93798 PHYS/QHP OP CAR RHAB W/ECG: CPT

## 2025-05-07 ENCOUNTER — CARDPULM VISIT (OUTPATIENT)
Age: 65
End: 2025-05-07
Attending: INTERNAL MEDICINE
Payer: COMMERCIAL

## 2025-05-07 PROCEDURE — 93798 PHYS/QHP OP CAR RHAB W/ECG: CPT

## 2025-05-09 ENCOUNTER — CARDPULM VISIT (OUTPATIENT)
Age: 65
End: 2025-05-09
Attending: INTERNAL MEDICINE
Payer: COMMERCIAL

## 2025-05-09 PROCEDURE — 93798 PHYS/QHP OP CAR RHAB W/ECG: CPT

## 2025-05-12 ENCOUNTER — CARDPULM VISIT (OUTPATIENT)
Age: 65
End: 2025-05-12
Attending: INTERNAL MEDICINE
Payer: COMMERCIAL

## 2025-05-12 PROCEDURE — 93798 PHYS/QHP OP CAR RHAB W/ECG: CPT

## 2025-05-14 ENCOUNTER — CARDPULM VISIT (OUTPATIENT)
Age: 65
End: 2025-05-14
Attending: INTERNAL MEDICINE
Payer: COMMERCIAL

## 2025-05-14 PROCEDURE — 93798 PHYS/QHP OP CAR RHAB W/ECG: CPT

## 2025-05-16 ENCOUNTER — CARDPULM VISIT (OUTPATIENT)
Age: 65
End: 2025-05-16
Attending: INTERNAL MEDICINE
Payer: COMMERCIAL

## 2025-05-16 PROCEDURE — 93798 PHYS/QHP OP CAR RHAB W/ECG: CPT

## 2025-05-19 ENCOUNTER — APPOINTMENT (OUTPATIENT)
Age: 65
End: 2025-05-19
Attending: INTERNAL MEDICINE
Payer: COMMERCIAL

## 2025-05-21 ENCOUNTER — APPOINTMENT (OUTPATIENT)
Age: 65
End: 2025-05-21
Attending: INTERNAL MEDICINE
Payer: COMMERCIAL

## 2025-05-23 ENCOUNTER — APPOINTMENT (OUTPATIENT)
Age: 65
End: 2025-05-23
Attending: INTERNAL MEDICINE
Payer: COMMERCIAL

## 2025-06-23 ENCOUNTER — OFFICE VISIT (OUTPATIENT)
Dept: NEUROLOGY | Facility: CLINIC | Age: 65
End: 2025-06-23
Payer: COMMERCIAL

## 2025-06-23 VITALS
WEIGHT: 221 LBS | DIASTOLIC BLOOD PRESSURE: 70 MMHG | HEART RATE: 60 BPM | SYSTOLIC BLOOD PRESSURE: 120 MMHG | RESPIRATION RATE: 16 BRPM | BODY MASS INDEX: 32 KG/M2

## 2025-06-23 DIAGNOSIS — G54.0 BRACHIAL PLEXOPATHY: Primary | ICD-10-CM

## 2025-06-23 DIAGNOSIS — M21.332 LEFT WRISTDROP: ICD-10-CM

## 2025-06-23 PROCEDURE — 3074F SYST BP LT 130 MM HG: CPT | Performed by: OTHER

## 2025-06-23 PROCEDURE — 99213 OFFICE O/P EST LOW 20 MIN: CPT | Performed by: OTHER

## 2025-06-23 PROCEDURE — 3078F DIAST BP <80 MM HG: CPT | Performed by: OTHER

## 2025-06-23 NOTE — PROGRESS NOTES
[facility] - [place]   Neurology    Juan Pablo Lam Patient Status:  No patient class for patient encounter    1960 MRN [id number]   Location [facility], [facility] PCP Mina Shanks MD              HPI:   Juan Pablo Lam is a(n) 64 year old male who presents at the request of Mina Shanks MD for evaluation of left wrist drop. Had CABG surgery one week ago in 2025, and when woke up, noted that he couldn't lift his left wrist or fingers. Fingers were also weak. Was noted to have decreased sensation in the middle and fourth finger, 3 days into symptoms, and yesterday noted tingling in those finger. Since today, has noticed that he can bend his left fingers more. No pain in left arm or shoulder.  Interim  Since LOV, has intermittent tingling in the left 3rd or 4th finger, or at times index finger. Denies weakness. Denies positional triggers, even though EMG showed CTS. No neck pain.      Pertinent imaging and laboratory work-up:   EMG 2025  Conclusion:   There is electrophysiologic suggestion of a left subacute upper and middle division plexopathy, or C6 and C7 cervical radiculopathies.  There is also suggestion of a chronic C8/T1 cervical radiculopathy.  In addition, there is evidence of bilateral mild to moderate compressive median mononeuropathies at the wrist, carpal tunnel.     Past Medical History:  Past Medical History:    Coronary atherosclerosis    High cholesterol        Past Surgical History:  Past Surgical History:   Procedure Laterality Date    Heart surgery      [place] teeth removed             Family History:  family history is not on file.  No history of neuropathy    Social History:   reports that he has never smoked. He has never used smokeless tobacco. He reports current alcohol use. He reports that he does not use drugs.    Allergies:  Allergies[1]    MEDICATIONS:    Current Outpatient Medications:     metoprolol succinate ER 50 MG  Oral Tablet 24 Hr, Take 1 tablet (50 mg total) by mouth Daily Beta Blocker., Disp: 90 tablet, Rfl: 3    rosuvastatin 40 MG Oral Tab, Take 1 tablet (40 mg total) by mouth nightly., Disp: 90 tablet, Rfl: 3    OMEGA-3 FATTY ACIDS OR, Take 300 mg by mouth daily., Disp: , Rfl:     Multiple Vitamins-Minerals (PX MENS MULTIVITAMINS OR), Take 1 tablet by mouth daily., Disp: , Rfl:     B Complex-C-Folic Acid Oral Tab, Take 1 tablet by mouth daily., Disp: , Rfl:     MISC NATURAL PRODUCTS OR, Take by mouth daily. Green tea, Disp: , Rfl:     Cetirizine HCl (ZYRTEC) 10 MG Oral Chew Tab, Chew 1 tablet (10 mg total) by mouth daily., Disp: , Rfl:     Apoaequorin (PREVAGEN OR), Take by mouth daily. Regular strength, Disp: , Rfl:     aspirin 81 MG Oral Chew Tab, Chew 1 tablet (81 mg total) by mouth daily., Disp: , Rfl:     HYDROcodone-acetaminophen 5-325 MG Oral Tab, Take 1 tablet by mouth every 6 (six) hours as needed. (Patient not taking: Reported on 6/23/2025), Disp: 20 tablet, Rfl: 0      Review of Systems:   A comprehensive 10 point review of systems was completed.     Pertinent positives and negatives noted in the HPI.         PHYSICAL EXAM:   Neurological Exam  Vitals  Vitals:    06/23/25 0813   BP: 120/70   Pulse: 60   Resp: 16       General Appearance: Patient is a 64 year old male in no acute distress  Cardiac: Normal rate & regular rhythm  Skin: There are no rashes or other skin lesions.  Musculoskeletal: There is no scoliosis, or joint deformities  Neurologic examination:  Mental status: Patient is alert, attentive, and oriented x 3. Language is coherent and fluent without aphasia. Memory, comprehension and ability to follow commands were intact.   Cranial nerves II-XII: Optic discs were sharp. Pupils were round and reacted to light. Extraocular movements were full. There was no face, jaw, palate or tongue weakness or atrophy. Facial sensation was normal. Hearing was grossly intact. Shoulder shrug was normal.   Motor exam  revealed normal muscle bulk and tone. No atrophy or fasciculations. Manual muscle testing revealed 5/5 in all muscles  Deep tendon reflexes were 2 at the biceps, brachioradialis, triceps, knee jerk, and ankle jerk. Plantar responses were flexor bilaterally.   Sensory exam revealed decreased pp in the extensor side left forearm, and extensor hand and fingers, volar surface fine. Vibratory perception and proprioception were intact at the toes. Pinprick and temperature were normal. Romberg sign was absent.  Complex motor skills revealed normal coordination. Finger-nose-finger intact.   Gait was narrow and stable, was able to walk on heels, toes and tandem without any difficulty.     ASSESSMENT/ACTIVE PROBLEM LIST:     Encounter Diagnoses   Name Primary?    Brachial plexopathy Yes    Left wristdrop        Discussion/Plan:  Brachial plexopathy post CABG, nearly resolved now  Discussed paresthesias in the 3rd and 4th fingers should resolve in next few months, small possibility that minimal symptoms will remain    Bilateral CTS- moderate on the right and mild on the left on EMG  However, at this time asymptomatic   Instructed to avoid repetitive flexed or extended wrist positions and movements, and to use a wrist pad behind the keyboard    Requested Prescriptions      No prescriptions requested or ordered in this encounter          We discussed in depth regarding the diagnosis, prognosis, treatment. The patient was given ample opportunity to ask questions. All questions and concerns were addressed.     Sobeida Perkins,   Neuromuscular and General Neurology  [facility]             [1] No Known Allergies

## 2025-06-23 NOTE — PATIENT INSTRUCTIONS
Refill policies:     Contact your pharmacy at least 5 days prior to running out of medication and have them send an electronic request or submit request through the “request refill” option in your [employer] account.  Allow 3-5 business days for refills; controlled substances may take longer.  If your prescription is due for a refill, please make sure you have a follow up appointment scheduled with the appropriate prescribing physician.  To best provide you care, patients receiving routine medications need to be seen at least once a year.  Patients receiving narcotic/controlled substance medications need to be seen at least once every 3 months.  Patients receiving narcotic/controlled substance medications will be required to sign an Opioid Treatment Agreement/Contract.  Refills will not be refilled on weekends or holidays; on-call physicians will not refill routine medications.  No narcotics or controlled substances are refilled after noon on Fridays or by on-call physicians.  Federal Law states narcotics must be electronically prescribed.  A 30-day supply with no refills is the maximum allowed by law.  In the event that your preferred pharmacy does not have the requested medication in stock (e.g., Backordered), it is your responsibility to find another pharmacy that has the requested medication available.  We will gladly send a new prescription to that pharmacy at your request.

## (undated) DEVICE — SUT PERMA- 0 18IN FSL NABSRB BLK L30MM 3/8

## (undated) DEVICE — OPEN HEART: Brand: MEDLINE INDUSTRIES, INC.

## (undated) DEVICE — GOWN,SIRUS,FABRIC-REINFORCED,X-LARGE: Brand: MEDLINE

## (undated) DEVICE — SUT VCRL 2-0 36IN CT-1 ABSRB UD L36MM 1/2 CIR

## (undated) DEVICE — SUT PROL 6-0 18IN C-1 NABSRB BLU 13MM 3/8 CIR

## (undated) DEVICE — STERILE POLYISOPRENE POWDER-FREE SURGICAL GLOVES: Brand: PROTEXIS

## (undated) DEVICE — CABLE PT L10FT ELECTRD PIN DIA1-2MM ATRIUM

## (undated) DEVICE — SPONGE LAP 18X18IN WHT COT 4 PLY FLD STRUNG

## (undated) DEVICE — TRANSFER PACK CONTAINER 600 ML WITH COUPLER. STERILE FLUID PATH: Brand: FENWAL TRANSFER PACK CONTAINER 600 ML WITH COUPLER

## (undated) DEVICE — GLOVE SUR 7 BIOGEL PI ORTHOPRO PIP BRN PWD F

## (undated) DEVICE — SUT POLYDEK 2-0 75CM NABSRB GRN

## (undated) DEVICE — Device: Brand: INTELLICART™

## (undated) DEVICE — VIOLET BRAIDED (POLYGLACTIN 910), SYNTHETIC ABSORBABLE SUTURE: Brand: COATED VICRYL

## (undated) DEVICE — SUT 6 DBL WIRE 14IN CCS-1 NABSRB L49MM 1/2 CI

## (undated) DEVICE — SYRINGE MED 30ML STD CLR PLAS LL TIP N CTRL

## (undated) DEVICE — YANKAUER,FLEXIBLE HANDLE,REGLR CAPACITY: Brand: MEDLINE INDUSTRIES, INC.

## (undated) DEVICE — CLIP INT L ORNG TI TRNSVRS GRV CHEVRON SHP

## (undated) DEVICE — LACTATED R 1000ML INJ

## (undated) DEVICE — Device: Brand: VIRTUOSAPH PLUS WITH RADIAL INDICATION

## (undated) DEVICE — SOLUTION IRRIG 1000ML 0.9% NACL USP BTL

## (undated) DEVICE — ANTIBACTERIAL UNDYED BRAIDED (POLYGLACTIN 910), SYNTHETIC ABSORBABLE SUTURE: Brand: COATED VICRYL

## (undated) DEVICE — SUT PROL 8-0 18IN BV130-5 NABSRB BLU 6.5MM 3/

## (undated) DEVICE — CABLE SUR L12IN SM CLP LNG DISP

## (undated) DEVICE — CELL SAVER RESERVOIR

## (undated) DEVICE — CABLE PT L10FT ELECTRD PIN DIA1-2MM VENTRICLE

## (undated) DEVICE — [HIGH FLOW INSUFFLATOR,  DO NOT USE IF PACKAGE IS DAMAGED,  KEEP DRY,  KEEP AWAY FROM SUNLIGHT,  PROTECT FROM HEAT AND RADIOACTIVE SOURCES.]: Brand: PNEUMOSURE

## (undated) NOTE — LETTER
[address]  [address]  Authorization for Surgical Operation and Procedure     Date:___1/15/2025________                                                                                                         Time:__1830________  I hereby authorize Dr. Cash Coffman, my physician and his/her assistants (if applicable), which may include medical students, residents, and/or fellows, to perform the following surgical operation/ procedure and administer such anesthesia as may be determined necessary by my physician:  Operation/Procedure name (s) Coronary Artery Bypass Graft on Juan Pablo Lam   2.   I recognize that during the surgical operation/procedure, unforeseen conditions may necessitate additional or different procedures than those listed above.  I, therefore, further authorize and request that the above-named surgeon, assistants, or designees perform such procedures as are, in their judgment, necessary and desirable.    3.   My surgeon/physician has discussed prior to my surgery the potential benefits, risks and side effects of this procedure; the likelihood of achieving goals; and potential problems that might occur during recuperation.  They also discussed reasonable alternatives to the procedure, including risks, benefits, and side effects related to the alternatives and risks related to not receiving this procedure.  I have had all my questions answered and I acknowledge that no guarantee has been made as to the result that may be obtained.    4.   Should the need arise during my operation/procedure, which includes change of level of care prior to discharge, I also consent to the administration of blood and/or blood products.  Further, I understand that despite careful testing and screening of blood or blood products by collecting agencies, I may still be subject to ill effects as a result of receiving a blood transfusion and/or blood products.  The following are some, but  not all, of the potential risks that can occur: fever and allergic reactions, hemolytic reactions, transmission of diseases such as Hepatitis, AIDS and Cytomegalovirus (CMV) and fluid overload.  In the event that I wish to have an autologous transfusion of my own blood, or a directed donor transfusion, I will discuss this with my physician.  Check only if Refusing Blood or Blood Products  I understand refusal of blood or blood products as deemed necessary by my physician may have serious consequences to my condition to include possible death. I hereby assume responsibility for my refusal and release the hospital, its personnel, and my physicians from any responsibility for the consequences of my refusal.          o  Refuse      5.   I authorize the use of any specimen, organs, tissues, body parts or foreign objects that may be removed from my body during the operation/procedure for diagnosis, research or teaching purposes and their subsequent disposal by hospital authorities.  I also authorize the release of specimen test results and/or written reports to my treating physician on the hospital medical staff or other referring or consulting physicians involved in my care, at the discretion of the Pathologist or my treating physician.    6.   I consent to the photographing or videotaping of the operations or procedures to be performed, including appropriate portions of my body for medical, scientific, or educational purposes, provided my identity is not revealed by the pictures or by descriptive texts accompanying them.  If the procedure has been photographed/videotaped, the surgeon will obtain the original picture, image, videotape or CD.  The hospital will not be responsible for storage, release or maintenance of the picture, image, tape or CD.    7.   I consent to the presence of a  or observers in the operating room as deemed necessary by my physician or their designees.    8.   I recognize that  in the event my procedure results in extended X-Ray/fluoroscopy time, I may develop a skin reaction.    9. If I have a Do Not Attempt Resuscitation (DNAR) order in place, that status will be suspended while in the operating room, procedural suite, and during the recovery period unless otherwise explicitly stated by me (or a person authorized to consent on my behalf). The surgeon or my attending physician will determine when the applicable recovery period ends for purposes of reinstating the DNAR order.  10. Patients having a sterilization procedure: I understand that if the procedure is successful the results will be permanent and it will therefore be impossible for me to inseminate, conceive, or bear children.  I also understand that the procedure is intended to result in sterility, although the result has not been guaranteed.   11. I acknowledge that my physician has explained sedation/analgesia administration to me including the risk and benefits I consent to the administration of sedation/analgesia as may be necessary or desirable in the judgment of my physician.    I CERTIFY THAT I HAVE READ AND FULLY UNDERSTAND THE ABOVE CONSENT TO OPERATION and/or OTHER PROCEDURE.    _________________________________________  __________________________________  Signature of Patient     Signature of Responsible Person         ___________________________________         Printed Name of Responsible Person           _________________________________                 Relationship to Patient  _________________________________________  ______________________________  Signature of Witness          Date  Time      Patient Name: Juan Pablo Lam     : 1960                 Printed: January 15, 2025     Medical Record #: [id number]                     Page 1 of [address] [place] [address]  [id number]    Consent for Anesthesia    I, Juan Pablo Lam agree to be cared for by an  anesthesiologist, who is specially trained to monitor me and give me medicine to put me to sleep or keep me comfortable during my procedure    I understand that my anesthesiologist is not an employee or agent of [facility] or [employer]. He or she works for [employer].    As the patient asking for anesthesia services, I agree to:  Allow the anesthesiologist (anesthesia doctor) to give me medicine and do additional procedures as necessary. Some examples are: Starting or using an “IV” to give me medicine, fluids or blood during my procedure, and having a breathing tube placed to help me breathe when I’m asleep (intubation). In the event that my heart stops working properly, I understand that my anesthesiologist will make every effort to sustain my life, unless otherwise directed by [facility] Do Not Resuscitate documents.  Tell my anesthesia doctor before my procedure:  If I am pregnant.  The last time that I ate or drank.  All of the medicines I take (including prescriptions, herbal supplements, and pills I can buy without a prescription (including street drugs/illegal medications). Failure to inform my anesthesiologist about these medicines may increase my risk of anesthetic complications.  If I am allergic to anything or have had a reaction to anesthesia before.  I understand how the anesthesia medicine will help me (benefits).  I understand that with any type of anesthesia medicine there are risks:  The most common risks are: nausea, vomiting, sore throat, muscle soreness, damage to my eyes, mouth, or teeth (from breathing tube placement).  Rare risks include: remembering what happened during my procedure, allergic reactions to medications, injury to my airway, heart, lungs, vision, nerves, or muscles and in extremely rare instances death.  My doctor has explained to me other choices available to me for my care (alternatives).  Pregnant Patients (“epidural”):  I understand  that the risks of having an epidural (medicine given into my back to help control pain during labor), include itching, low blood pressure, difficulty urinating, headache or slowing of the baby’s heart. Very rare risks include infection, bleeding, seizure, irregular heart rhythms and nerve injury.  Regional Anesthesia (“spinal”, “epidural”, & “nerve blocks”):  I understand that rare but potential complications include headache, bleeding, infection, seizure, irregular heart rhythms, and nerve injury.    I can change my mind about having anesthesia services at any time before I get the medicine.    _____________________________________________________________________________  Patient (or Representative) Signature/Relationship to Patient  Date   Time    _____________________________________________________________________________   Name (if used)    Language/Organization   Time    _____________________________________________________________________________  Anesthesiologist Signature     Date   Time  I have discussed the procedure and information above with the patient (or patient’s representative) and answered their questions. The patient or their representative has agreed to have anesthesia services.    _____________________________________________________________________________  Witness        Date   Time  I have verified that the signature is that of the patient or patient’s representative, and that it was signed before the procedure  Patient Name: Juan Pablo Lam     : 1960                 Printed: January 15, 2025     Medical Record #: [id number]                     Page 2 of 2

## (undated) NOTE — LETTER
Rk Yoo M.D., F.A.C.S.  Unruly Stewart M.D., F.A.C.S.  Bruce Farmer M.D.   Nik Rocha M.D., F.A.C.S.  LAURA Haque M.D., F.A.C.S.   Shubham Rabago M.D., F.A.C.S.  Mathew Nichols M.D.    LAURA Roberts M.D.   LAURA Hull M.D., M.B.A.  Keon Witt M.D.  LAURA Conklin M.D., F.A.C.S.  Cash Coffman M.D., F.A.C.S.   LAURA Mejias M.D., F.A.C.S., F.A.C.C. Jourdan Ferrell M.D.  Vinny Belcher M.D.    NGA Dubois D.O., F.A.C.S.  Hammad Marin M.D.   Wes Toro M.D.  Geovanny Barba M.D.    Junior Magaña M.D., F.A.C.S.        Welcome to [facility]    As you contemplate possible surgical treatment, it is very important to us that you understand fully what is being discussed, that all of your questions have been answered, and that your options for treatment have been fully explained.    To that end, on the following page we will ask you some questions to make certain that you understand everything which has been explained to you. Included in this understanding is that there are both surgical and nonsurgical treatments available for you, that you have options regarding where your care is given, and what doctors are involved in your care. Included in these options would, of course, be the option to elect for no treatment whatsoever. We especially want to be sure that you have had a chance to have all of your questions and concerns answered. If there are any issues which have not been adequately addressed, we ask you to bring them forward so that we can thoroughly address them.    A patient who is fully informed and understands their condition and options for treatment, as well as potential adverse effects of treatment, is going to be a patient who receives the most benefit out of care rendered.  Our goal in addition to providing excellent surgical care is to provide the necessary information to you and your family in order to make decisions which are appropriate relative to your own care.    Please take the time necessary to read and answer the questions on the next page. Again, if you have any questions, bring them forward and we will certainly address them.    Sincerely,    [facility]    Date:___________________ _______________________ _______________________       Printed Patient Name  Signature of Patient    Date:___________________ _______________________ _______________________       Printed Witness Name  Signature of Witness    _______________________       Relationship of Witness to Patient        Consent Form Page: 1 of 2  [address] [facility] * [facility] [address] * [place] [address] * [phone] / [phone] *  Fax [phone] * Billing Fax [phone] Version: 9/9/2024    [facility]  Supplemental Consent Form    A [facility] surgeon has met with me and explained the matter of my illness, and what treatments might be available to improve my condition. As a result of that conversation, I understand the following:    A [facility] surgeon met with me and explained, in detail, the nature of my condition for which surgery is being contemplated. The procedure being performed is:       Yes _____ No _____    A [facility] surgeon met with me and explained to me that there are alternatives to surgery which may include no surgery, medical therapy, or interventional treatment, among other options and the risks and benefits of the different treatment options:Yes _____ No _____    A [facility] surgeon has explained to me that if I should desire, he/she is willing to explain my case and the surgical and non-surgical options to family members:            Yes _____ No _____    A [facility] surgeon has answered all of my questions regarding the topics we have  discussed. I have been invited to ask more questions:  Yes _____ No _____    A [facility] surgeon has explained to me that if I seek other options or wish treatment at elsewhere, that his/her office will assist me in making such recommendations:  Yes _____ No _____    A [facility] surgeon has explained to me that death, risk of bleeding, stroke, multi-organ failure, heart attack and/or other complications are risks for the proposed surgical procedure:        Yes _____ No _____    A [facility] surgeon has explained to me that I have the right to cancel or postpone the surgery at any time prior to the start of surgery:    Yes _____ No _____    The nature and options for treatment for my condition has been explained to me, in detail, by a [facility] surgeon and all questions have been answered to my satisfaction. I understand that I am not required to undergo surgery, and further, that if I so desire, I could have surgery accomplished by another surgeon or at another institution. I understand and accept that which has been explained to me. I am able to make my decisions knowingly and willingly based on the data.    Date:___________________ _______________________ _______________________       Printed Patient Name  Signature of Patient    Date:___________________ _______________________ _______________________       Printed Witness Name  Signature of Witness    _______________________   Relationship of Witness to Patient          Consent Form Page: 3 of  2  [address] [facility] * [address] * [place] [address] * [phone] / [phone] *  Fax [phone] * Yoni Fax [phone] Version: 9/9/2024

## (undated) NOTE — LETTER
[facility]  [address] [address]  [phone]    Blood Transfusion Consent    In the course of your treatment, it may become necessary to administer a transfusion of blood or blood components. This form provides basic information concerning this procedure and, if signed by you, authorizes its administration. By signing this form, you agree that all of your questions about the administration of blood or blood products have been answered by the ordering medical professional or designee.    Description of Procedure  Blood is introduced into one of your veins, commonly in the arm, using a sterilized disposable needle. The amount of blood transfused, and whether the transfusion will be of blood or blood components is a judgement the physician will make based on your particular needs.    Risks  The transfusion is a common procedure of low risk.  MINOR AND TEMPORARY REACTIONS ARE NOT UNCOMMON, including a slight bruise, swelling or local reaction in the area where the needle pierces your skin, or a nonserious reaction to the transfused material itself, including headache, fever or mild skin reaction, such as rash.  Serious reactions are possible, though very unlikely, and include severe allergic reaction (shock) and destruction (hemolysis) of transfused blood cells.  Infectious diseases which are known to be transmitted by blood transfusion include certain types of viral Hepatitis(liver infection from a virus), Human Immunodeficiency Virus (HIV-1,2) infection, a viral infection known to cause Acquired Immunodeficiency Syndrome (AIDS), as well as certain other bacterial, viral, and parasitic diseases. While a minimal risk of acquiring an infectious disease from transfused blood exists, in accordance with the Federal and State law, all due care has been taken in donor selection and testing to avoid transmission of disease.    Alternatives  If loss of blood poses serious threats during your  treatment, THERE IS NO EFFECTIVE ALTERNATIVE TO BLOOD TRANSFUSION. However, if you have any further questions on this matter, your provider will fully explain the alternatives to you if it has not already been done.    I, ______________________________, have read/had read to me the above. I understand the matters bearing on the decision whether or not to authorize a transfusion of blood or blood components. I have no questions which have not been answered to my full satisfaction. I hereby consent to such transfusion as my physician may deem necessary or advisable in the course of my treatment.    ______________________________________________                    ___________________________  (Signature of Patient or Responsible party in case of minor,                 (Printed Name of Patient or incompetent, or unconscious patient)              Responsible Party)    ___________________________               _____________________  (Relationship to Patient if not self)                                    (Date and Time)    __________________________                                                           ______________________              (Signature of Witness)               (Printed Name of Witness)     Language line ()    Telephone/Verbal/Video Consent    __________________________                     ____________________  (Signature of 2nd Witness           (Printed Name of 2nd  Telephone/Verbal/Video Consent)           Witness)    Patient Name: Juan Pablo Lam     : 1960                 Printed: January 15, 2025     Medical Record #: [id number]      Rev: 2023